# Patient Record
Sex: MALE | Race: WHITE | Employment: OTHER | URBAN - METROPOLITAN AREA
[De-identification: names, ages, dates, MRNs, and addresses within clinical notes are randomized per-mention and may not be internally consistent; named-entity substitution may affect disease eponyms.]

---

## 2017-01-20 ENCOUNTER — HOSPITAL ENCOUNTER (EMERGENCY)
Age: 82
Discharge: HOME | End: 2017-01-21
Attending: EMERGENCY MEDICINE | Admitting: EMERGENCY MEDICINE
Payer: MEDICARE

## 2017-01-20 DIAGNOSIS — R33.9 URINARY RETENTION: Primary | ICD-10-CM

## 2017-01-20 LAB
APPEARANCE UR: CLEAR
BACTERIA URNS QL MICRO: ABNORMAL /HPF
BILIRUB UR QL: NEGATIVE
COLOR UR: YELLOW
EPITH CASTS URNS QL MICRO: NEGATIVE /LPF (ref 0–5)
GLUCOSE UR STRIP.AUTO-MCNC: NEGATIVE MG/DL
HGB UR QL STRIP: ABNORMAL
KETONES UR QL STRIP.AUTO: NEGATIVE MG/DL
LEUKOCYTE ESTERASE UR QL STRIP.AUTO: NEGATIVE
NITRITE UR QL STRIP.AUTO: NEGATIVE
PH UR STRIP: 6 [PH] (ref 5–8)
PROT UR STRIP-MCNC: NEGATIVE MG/DL
RBC #/AREA URNS HPF: ABNORMAL /HPF (ref 0–5)
SP GR UR REFRACTOMETRY: 1.02 (ref 1–1.03)
UROBILINOGEN UR QL STRIP.AUTO: 0.2 EU/DL (ref 0.2–1)
WBC URNS QL MICRO: ABNORMAL /HPF (ref 0–4)

## 2017-01-20 PROCEDURE — 51702 INSERT TEMP BLADDER CATH: CPT

## 2017-01-20 PROCEDURE — 81001 URINALYSIS AUTO W/SCOPE: CPT | Performed by: EMERGENCY MEDICINE

## 2017-01-20 PROCEDURE — 77030034849

## 2017-01-20 PROCEDURE — 99284 EMERGENCY DEPT VISIT MOD MDM: CPT

## 2017-01-20 RX ORDER — PREDNISONE 5 MG/1
7.5 TABLET ORAL DAILY
COMMUNITY
End: 2017-06-27

## 2017-01-21 VITALS
WEIGHT: 164 LBS | RESPIRATION RATE: 18 BRPM | DIASTOLIC BLOOD PRESSURE: 70 MMHG | SYSTOLIC BLOOD PRESSURE: 140 MMHG | OXYGEN SATURATION: 94 % | HEART RATE: 61 BPM | HEIGHT: 67 IN | TEMPERATURE: 97.6 F | BODY MASS INDEX: 25.74 KG/M2

## 2017-01-21 NOTE — ED NOTES
Purposeful rounding completed:    Side rails up x 2:  YES  Bed low and wheels and locked: YES  Call bell in reach: YES  Comfort addressed: YES     Toileting needs addressed: YES  Plan of care reviewed/updated with patient and or family members: YES  IV site assessed: YES  Pain assessed and addressed: denies pain and resting comfortably in NAD.   Waiting for urine results.

## 2017-01-21 NOTE — ED PROVIDER NOTES
[facility] EMERGENCY DEPT      80 y.o. male with noted past medical history who presents to the emergency department c/o urinary retention onset yesterday. Pt states \"I've been dribbling after using the restroom\". Per relative, pt has a hx of an enlarged prostate. She states that the patient was seen by urology in June 2016 and he had a UTI. She then states that they placed in a catheter and was given an antibiotic. She reports the pt's last appointment with Urology was in 08/2016 and his prostate medication was changed. She reports an extensive hx with prostate complications. No other complaints. No current facility-administered medications for this encounter. Current Outpatient Prescriptions   Medication Sig    predniSONE (DELTASONE) 5 mg tablet Take 7.5 mg by mouth daily.  ranitidine (ZANTAC) 150 mg tablet Take 150 mg by mouth two (2) times a day.  tamsulosin (FLOMAX) 0.4 mg capsule Take 1 Cap by mouth daily (after dinner).  finasteride (PROSCAR) 5 mg tablet Take 1 Tab by mouth daily.  furosemide (LASIX) 20 mg tablet 1 daily (Patient taking differently: Take 40 mg by mouth two (2) times a day. 1 daily)    diltiazem CD (CARDIZEM CD) 180 mg ER capsule Take 1 Cap by mouth daily.  albuterol-ipratropium (DUO-NEB) 2.5 mg-0.5 mg/3 ml nebulizer solution 3 mL by Nebulization route every six (6) hours.  aspirin 81 mg chewable tablet Take 1 Tab by mouth daily.  fluticasone-salmeterol (ADVAIR DISKUS) 500-50 mcg/dose diskus inhaler Take 1 Puff by inhalation every twelve (12) hours.  clopidogrel (PLAVIX) 75 mg tablet Take 75 mg by mouth daily. Indications: lot big toe    levothyroxine (SYNTHROID) 50 mcg tablet Take 50 mcg by mouth Daily (before breakfast).  Indications: HYPOTHYROIDISM       Past Medical History   Diagnosis Date    BPH (benign prostatic hyperplasia)     COPD (chronic obstructive pulmonary disease) (HCC)     History of blood clots     Hypercholesterolemia     Hypertension     Hypertensive cardiovascular disease     Hypothyroidism     Infection and inflammatory reaction due to indwelling urinary catheter, subsequent encounter     Peripheral vascular disease ([facility] [address]     Pneumonia     Urinary retention     UTI (urinary tract infection)        History reviewed. No pertinent past surgical history. History reviewed. No pertinent family history. Social History     Social History    Marital status:      Spouse name: N/A    Number of children: N/A    Years of education: N/A     Occupational History    Not on file. Social History Main Topics    Smoking status: Former Smoker    Smokeless tobacco: Not on file    Alcohol use No    Drug use: No    Sexual activity: Not on file     Other Topics Concern    Not on file     Social History Narrative       No Known Allergies    Patient's primary care provider (as noted in EPIC):  Juliana Murillo MD    REVIEW OF SYSTEMS:    Constitutional:  Negative for diaphoresis. HENT:  Negative for congestion. Respiratory:  Negative for cough and shortness of breath. Cardiovascular:  Negative for chest pain and palpitations. Gastrointestinal:  No rectal bleeding. Genitourinary:  Positive urinary retention. Negative for flank pain. Musculoskeletal:  Negative for back pain. Skin:  Negative for pallor. Neurological:  Negative for weakness. Visit Vitals    /76 (BP 1 Location: Right arm, BP Patient Position: Sitting)    Pulse 60    Temp 97.6 °F (36.4 °C)    Resp 20    Ht 5' 7\" (1.702 m)    Wt 74.4 kg (164 lb)    SpO2 93%    BMI 25.69 kg/m2       PHYSICAL EXAM:    CONSTITUTIONAL:  Alert, in no apparent distress;  well developed;  well nourished. HEAD:  Normocephalic, atraumatic. EYES:  EOMI. Non-icteric sclera. Normal conjunctiva. ENTM:  Nose:  no rhinorrhea. Throat:  no erythema or exudate, mucous membranes moist.  NECK:  No JVD.   Supple  RESPIRATORY:  Chest clear, equal breath sounds, good air movement. CARDIOVASCULAR:  Regular rate and rhythm. No murmurs, rubs, or gallops. GI:  GI:  Normal bowel sounds, abdomen soft with minimal suprapubic abdominal tenderness to palpation. No rebound or guarding. BACK:  Non-tender. No CVAT. UPPER EXT:  Normal inspection. LOWER EXT:  No edema, no calf tenderness. Distal pulses intact. NEURO:  Moves all four extremities, and grossly normal motor exam.  SKIN:  No rashes;  Normal for age. PSYCH:  Alert and normal affect.     Abnormal lab results from this emergency department encounter:  Labs Reviewed   URINALYSIS W/ RFLX MICROSCOPIC - Abnormal; Notable for the following:        Result Value    Blood SMALL (*)     All other components within normal limits   URINE MICROSCOPIC ONLY - Abnormal; Notable for the following:     Bacteria FEW (*)     All other components within normal limits       Lab values for this patient within approximately the last 12 hours:  Recent Results (from the past 12 hour(s))   URINALYSIS W/ RFLX MICROSCOPIC    Collection Time: 01/20/17 10:53 PM   Result Value Ref Range    Color YELLOW      Appearance CLEAR      Specific gravity 1.023 1.005 - 1.030      pH (UA) 6.0 5.0 - 8.0      Protein NEGATIVE  NEG mg/dL    Glucose NEGATIVE  NEG mg/dL    Ketone NEGATIVE  NEG mg/dL    Bilirubin NEGATIVE  NEG      Blood SMALL (A) NEG      Urobilinogen 0.2 0.2 - 1.0 EU/dL    Nitrites NEGATIVE  NEG      Leukocyte Esterase NEGATIVE  NEG     URINE MICROSCOPIC ONLY    Collection Time: 01/20/17 10:53 PM   Result Value Ref Range    WBC 0 to 3 0 - 4 /hpf    RBC 4 to 10 0 - 5 /hpf    Epithelial cells NEGATIVE  0 - 5 /lpf    Bacteria FEW (A) NEG /hpf       Radiologist and cardiologist interpretations if available at time of this note:  No orders to display       Medication(s) ordered for patient during this emergency visit encounter:  Medications - No data to display    ED COURSE:      IMPRESSION AND MEDICAL DECISION MAKING:  Based upon the patients presentation with noted HPI and PE, along with the work up done in the emergency department, I believe that the patient is having urinary retention. DIAGNOSIS:  1. Urinary retention  2. BPH history     SPECIFIC PATIENT INSTRUCTIONS FROM THE PHYSICIAN WHO TREATED YOU IN THE ER TODAY:  1. Return if any concerns or worsening of condition(s)  2. Keep the Steele catheter in until seen by the urologist.   3. Follow up with your urologist or the one listed in these discharge instructions. IAN Mckeon Board Certified Emergency Physician    Provider Attestation:  If a scribe was utilized in generation of this patient record, I personally performed the services described in the documentation, reviewed the documentation, as recorded by the scribe in my presence, and it accurately records the patient's history of presenting illness, review of systems, patient physical examination, and procedures performed by me as the attending physician. IAN Mckeon Board Certified Emergency Physician  1/20/2017.  10:23 PM    SCRIBE ATTESTATION STATEMENT  Documented by: Tiki stubbsing for, and in the presence of, Nhi Murry MD 10:26 PM    Signed by: Carin Worrell, 01/21/17 10:26 PM

## 2017-01-21 NOTE — DISCHARGE INSTRUCTIONS
SPECIFIC PATIENT INSTRUCTIONS FROM THE PHYSICIAN WHO TREATED YOU IN THE ER TODAY:  1. Return if any concerns or worsening of condition(s)  2. Keep the Steele catheter in until seen by the urologist.   3. Follow up with your urologist or the one listed in these discharge instructions. Urinary Retention: Care Instructions  Your Care Instructions    Urinary retention means that you aren't able to urinate. In men, it is often caused by a blockage of the urinary tract from an enlarged prostate gland. In men and women, it can also be caused by an infection or nerve damage. Or it may be a side effect of a medicine. The doctor may have drained the urine from your bladder. If you still have problems passing urine, you may need to use a catheter at home. This is used to empty your bladder until the problem can be fixed. Your doctor may put a catheter in your bladder before you go home. If so, he or she will tell you when to come back to have the catheter removed. The doctor has checked you closely. But problems can develop later. If you notice any problems or new symptoms, get medical treatment right away. Follow-up care is a key part of your treatment and safety. Be sure to make and go to all appointments, and call your doctor if you are having problems. It's also a good idea to know your test results and keep a list of the medicines you take. How can you care for yourself at home? · Take your medicines exactly as prescribed. Call your doctor if you think you are having a problem with your medicine. You will get more details on the specific medicines your doctor prescribes. · Check with your doctor before you use any over-the-counter medicines. Many cold and allergy medicines, for example, can make this problem worse. Make sure your doctor knows all of the medicines, vitamins, supplements, and herbal remedies you take. · Spread out through the day the amount of fluid you drink.  Do not drink a lot at bedtime. · Avoid alcohol and caffeine. · If you have been given a catheter, or if one is already in place, follow the instructions you were given. Always wash your hands before and after you handle the catheter. When should you call for help? Call your doctor now or seek immediate medical care if:  · You cannot urinate at all, or it is getting harder to urinate. · You have symptoms of a urinary tract infection. These may include:  ¨ Pain or burning when you urinate. ¨ A frequent need to urinate without being able to pass much urine. ¨ Pain in the flank, which is just below the rib cage and above the waist on either side of the back. ¨ Blood in your urine. ¨ A fever. Watch closely for changes in your health, and be sure to contact your doctor if:  · You have any problems with your catheter. · You do not get better as expected. Where can you learn more? Go to http://leanne-cate.info/. Enter M244 in the search box to learn more about \"Urinary Retention: Care Instructions. \"  Current as of: August 12, 2016  Content Version: 11.1  © 9647-1910 [employer]. Care instructions adapted under license by [employer] (which disclaims liability or warranty for this information). If you have questions about a medical condition or this instruction, always ask your healthcare professional. [address] any warranty or liability for your use of this information. [employer] Activation    Thank you for requesting access to [employer]. Please follow the instructions below to securely access and download your online medical record. [employer] allows you to send messages to your doctor, view your test results, renew your prescriptions, schedule appointments, and more. How Do I Sign Up? 1. In your internet browser, go to [email or web address]. 2. Click on the First Time User? Click Here link in the Sign In box.  You will see the New Member Sign Up page. 3. Enter your [employer] Access Code exactly as it appears below. You will not need to use this code after youve completed the sign-up process. If you do not sign up before the expiration date, you must request a new code. [employer] Access Code: [email or web address]  Expires: 2017  9:38 PM (This is the date your [employer] access code will )    4. Enter the last four digits of your Social Security Number (xxxx) and Date of Birth (mm/dd/yyyy) as indicated and click Submit. You will be taken to the next sign-up page. 5. Create a Over[employer] ID. This will be your [employer] login ID and cannot be changed, so think of one that is secure and easy to remember. 6. Create a [employer] password. You can change your password at any time. 7. Enter your Password Reset Question and Answer. This can be used at a later time if you forget your password. 8. Enter your e-mail address. You will receive e-mail notification when new information is available in [address] 9. Click Sign Up. You can now view and download portions of your medical record. 10. Click the Download Summary menu link to download a portable copy of your medical information. Additional Information    If you have questions, please visit the Frequently Asked Questions section of the [employer] website at [email or web address] Remember, [employer] is NOT to be used for urgent needs. For medical emergencies, dial 911.

## 2017-01-21 NOTE — ED NOTES
Steele catheter inserted without difficulty. Pt tolerated the procedure well. Urine sample sent to the lab.

## 2017-01-21 NOTE — ED NOTES
Discharged paperwork reviewed with patient. Patient states understanding. All questions answered. Armband removed. Pt escorted by wheelchair from the ED with family.

## 2017-06-13 ENCOUNTER — HOSPITAL ENCOUNTER (INPATIENT)
Age: 82
LOS: 14 days | Discharge: NURSING, REHAB OR LONG-TERM CARE | DRG: 190 | End: 2017-06-27
Attending: INTERNAL MEDICINE | Admitting: INTERNAL MEDICINE
Payer: MEDICARE

## 2017-06-13 ENCOUNTER — APPOINTMENT (OUTPATIENT)
Dept: GENERAL RADIOLOGY | Age: 82
DRG: 190 | End: 2017-06-13
Attending: EMERGENCY MEDICINE
Payer: MEDICARE

## 2017-06-13 DIAGNOSIS — E87.1 HYPONATREMIA: ICD-10-CM

## 2017-06-13 DIAGNOSIS — J18.9 PNEUMONIA OF RIGHT LOWER LOBE DUE TO INFECTIOUS ORGANISM: ICD-10-CM

## 2017-06-13 DIAGNOSIS — J44.1 COPD EXACERBATION (HCC): Primary | ICD-10-CM

## 2017-06-13 LAB
ALBUMIN SERPL BCP-MCNC: 2.8 G/DL (ref 3.4–5)
ALBUMIN/GLOB SERPL: 0.8 {RATIO} (ref 0.8–1.7)
ALP SERPL-CCNC: 124 U/L (ref 45–117)
ALT SERPL-CCNC: 118 U/L (ref 16–61)
ANION GAP BLD CALC-SCNC: 13 MMOL/L (ref 3–18)
ARTERIAL PATENCY WRIST A: YES
AST SERPL W P-5'-P-CCNC: 90 U/L (ref 15–37)
ATRIAL RATE: 122 BPM
BASE EXCESS BLD CALC-SCNC: 4 MMOL/L
BASOPHILS # BLD AUTO: 0 K/UL (ref 0–0.06)
BASOPHILS # BLD: 0 % (ref 0–2)
BDY SITE: ABNORMAL
BILIRUB SERPL-MCNC: 0.9 MG/DL (ref 0.2–1)
BNP SERPL-MCNC: 4072 PG/ML (ref 0–1800)
BODY TEMPERATURE: 98.7
BUN SERPL-MCNC: 31 MG/DL (ref 7–18)
BUN/CREAT SERPL: 22 (ref 12–20)
CALCIUM SERPL-MCNC: 8 MG/DL (ref 8.5–10.1)
CALCULATED R AXIS, ECG10: 53 DEGREES
CALCULATED T AXIS, ECG11: 63 DEGREES
CHLORIDE SERPL-SCNC: 81 MMOL/L (ref 100–108)
CO2 SERPL-SCNC: 26 MMOL/L (ref 21–32)
CREAT SERPL-MCNC: 1.42 MG/DL (ref 0.6–1.3)
DIAGNOSIS, 93000: NORMAL
DIFFERENTIAL METHOD BLD: ABNORMAL
EOSINOPHIL # BLD: 0 K/UL (ref 0–0.4)
EOSINOPHIL NFR BLD: 0 % (ref 0–5)
ERYTHROCYTE [DISTWIDTH] IN BLOOD BY AUTOMATED COUNT: 14.3 % (ref 11.6–14.5)
GAS FLOW.O2 O2 DELIVERY SYS: ABNORMAL L/MIN
GLOBULIN SER CALC-MCNC: 3.4 G/DL (ref 2–4)
GLUCOSE SERPL-MCNC: 142 MG/DL (ref 74–99)
HCO3 BLD-SCNC: 28.7 MMOL/L (ref 22–26)
HCT VFR BLD AUTO: 38.7 % (ref 36–48)
HGB BLD-MCNC: 13.4 G/DL (ref 13–16)
LACTATE BLD-SCNC: 1.2 MMOL/L (ref 0.4–2)
LYMPHOCYTES # BLD AUTO: 5 % (ref 21–52)
LYMPHOCYTES # BLD: 1.1 K/UL (ref 0.9–3.6)
MCH RBC QN AUTO: 29.6 PG (ref 24–34)
MCHC RBC AUTO-ENTMCNC: 34.6 G/DL (ref 31–37)
MCV RBC AUTO: 85.4 FL (ref 74–97)
MONOCYTES # BLD: 1.1 K/UL (ref 0.05–1.2)
MONOCYTES NFR BLD AUTO: 5 % (ref 3–10)
NEUTS SEG # BLD: 18.4 K/UL (ref 1.8–8)
NEUTS SEG NFR BLD AUTO: 90 % (ref 40–73)
O2/TOTAL GAS SETTING VFR VENT: 0.5 %
OSMOLALITY SERPL: 266 MOSM/KG H2O (ref 280–300)
PCO2 BLD: 44.5 MMHG (ref 35–45)
PH BLD: 7.42 [PH] (ref 7.35–7.45)
PLATELET # BLD AUTO: 190 K/UL (ref 135–420)
PMV BLD AUTO: 9.5 FL (ref 9.2–11.8)
PO2 BLD: 86 MMHG (ref 80–100)
POTASSIUM SERPL-SCNC: 4.2 MMOL/L (ref 3.5–5.5)
PROT SERPL-MCNC: 6.2 G/DL (ref 6.4–8.2)
Q-T INTERVAL, ECG07: 412 MS
QRS DURATION, ECG06: 90 MS
QTC CALCULATION (BEZET), ECG08: 495 MS
RBC # BLD AUTO: 4.53 M/UL (ref 4.7–5.5)
SAO2 % BLD: 97 % (ref 92–97)
SERVICE CMNT-IMP: ABNORMAL
SODIUM SERPL-SCNC: 120 MMOL/L (ref 136–145)
SPECIMEN TYPE: ABNORMAL
TOTAL RESP. RATE, ITRR: 16
TROPONIN I BLD-MCNC: <0.04 NG/ML (ref 0–0.08)
TSH SERPL DL<=0.05 MIU/L-ACNC: 5.1 UIU/ML (ref 0.36–3.74)
VENTRICULAR RATE, ECG03: 87 BPM
WBC # BLD AUTO: 20.7 K/UL (ref 4.6–13.2)

## 2017-06-13 PROCEDURE — 80053 COMPREHEN METABOLIC PANEL: CPT | Performed by: EMERGENCY MEDICINE

## 2017-06-13 PROCEDURE — 93005 ELECTROCARDIOGRAM TRACING: CPT

## 2017-06-13 PROCEDURE — 83930 ASSAY OF BLOOD OSMOLALITY: CPT | Performed by: INTERNAL MEDICINE

## 2017-06-13 PROCEDURE — 83880 ASSAY OF NATRIURETIC PEPTIDE: CPT | Performed by: EMERGENCY MEDICINE

## 2017-06-13 PROCEDURE — 77010033711 HC HIGH FLOW OXYGEN

## 2017-06-13 PROCEDURE — 65660000000 HC RM CCU STEPDOWN

## 2017-06-13 PROCEDURE — 99285 EMERGENCY DEPT VISIT HI MDM: CPT

## 2017-06-13 PROCEDURE — 74011250636 HC RX REV CODE- 250/636: Performed by: INTERNAL MEDICINE

## 2017-06-13 PROCEDURE — 94640 AIRWAY INHALATION TREATMENT: CPT

## 2017-06-13 PROCEDURE — 84484 ASSAY OF TROPONIN QUANT: CPT

## 2017-06-13 PROCEDURE — 96375 TX/PRO/DX INJ NEW DRUG ADDON: CPT

## 2017-06-13 PROCEDURE — 71010 XR CHEST PORT: CPT

## 2017-06-13 PROCEDURE — 74011000250 HC RX REV CODE- 250: Performed by: INTERNAL MEDICINE

## 2017-06-13 PROCEDURE — 85025 COMPLETE CBC W/AUTO DIFF WBC: CPT | Performed by: EMERGENCY MEDICINE

## 2017-06-13 PROCEDURE — 84443 ASSAY THYROID STIM HORMONE: CPT | Performed by: INTERNAL MEDICINE

## 2017-06-13 PROCEDURE — 83605 ASSAY OF LACTIC ACID: CPT

## 2017-06-13 PROCEDURE — 74011000258 HC RX REV CODE- 258: Performed by: INTERNAL MEDICINE

## 2017-06-13 PROCEDURE — 96365 THER/PROPH/DIAG IV INF INIT: CPT

## 2017-06-13 PROCEDURE — 77030029684 HC NEB SM VOL KT MONA -A

## 2017-06-13 PROCEDURE — 51798 US URINE CAPACITY MEASURE: CPT

## 2017-06-13 PROCEDURE — 36600 WITHDRAWAL OF ARTERIAL BLOOD: CPT

## 2017-06-13 PROCEDURE — 94760 N-INVAS EAR/PLS OXIMETRY 1: CPT

## 2017-06-13 PROCEDURE — 82803 BLOOD GASES ANY COMBINATION: CPT

## 2017-06-13 PROCEDURE — 74011250637 HC RX REV CODE- 250/637: Performed by: INTERNAL MEDICINE

## 2017-06-13 RX ORDER — MAGNESIUM SULFATE HEPTAHYDRATE 40 MG/ML
2 INJECTION, SOLUTION INTRAVENOUS
Status: COMPLETED | OUTPATIENT
Start: 2017-06-13 | End: 2017-06-19

## 2017-06-13 RX ORDER — DILTIAZEM HYDROCHLORIDE 180 MG/1
180 CAPSULE, COATED, EXTENDED RELEASE ORAL DAILY
Status: DISCONTINUED | OUTPATIENT
Start: 2017-06-14 | End: 2017-06-27 | Stop reason: HOSPADM

## 2017-06-13 RX ORDER — HEPARIN SODIUM 5000 [USP'U]/ML
5000 INJECTION, SOLUTION INTRAVENOUS; SUBCUTANEOUS EVERY 12 HOURS
Status: DISCONTINUED | OUTPATIENT
Start: 2017-06-13 | End: 2017-06-27 | Stop reason: HOSPADM

## 2017-06-13 RX ORDER — PANTOPRAZOLE SODIUM 40 MG/1
40 TABLET, DELAYED RELEASE ORAL
Status: DISCONTINUED | OUTPATIENT
Start: 2017-06-14 | End: 2017-06-27 | Stop reason: HOSPADM

## 2017-06-13 RX ORDER — CLOPIDOGREL BISULFATE 75 MG/1
75 TABLET ORAL DAILY
Status: DISCONTINUED | OUTPATIENT
Start: 2017-06-14 | End: 2017-06-27 | Stop reason: HOSPADM

## 2017-06-13 RX ORDER — FUROSEMIDE 10 MG/ML
80 INJECTION INTRAMUSCULAR; INTRAVENOUS
Status: COMPLETED | OUTPATIENT
Start: 2017-06-13 | End: 2017-06-13

## 2017-06-13 RX ORDER — FUROSEMIDE 40 MG/1
40 TABLET ORAL
Status: DISCONTINUED | OUTPATIENT
Start: 2017-06-14 | End: 2017-06-27 | Stop reason: HOSPADM

## 2017-06-13 RX ORDER — TAMSULOSIN HYDROCHLORIDE 0.4 MG/1
0.4 CAPSULE ORAL
Status: DISCONTINUED | OUTPATIENT
Start: 2017-06-13 | End: 2017-06-27 | Stop reason: HOSPADM

## 2017-06-13 RX ORDER — FINASTERIDE 5 MG/1
5 TABLET, FILM COATED ORAL DAILY
Status: DISCONTINUED | OUTPATIENT
Start: 2017-06-14 | End: 2017-06-27 | Stop reason: HOSPADM

## 2017-06-13 RX ORDER — DIAZEPAM 10 MG/2ML
1 INJECTION INTRAMUSCULAR
Status: COMPLETED | OUTPATIENT
Start: 2017-06-13 | End: 2017-06-13

## 2017-06-13 RX ORDER — GUAIFENESIN 100 MG/5ML
81 LIQUID (ML) ORAL DAILY
Status: DISCONTINUED | OUTPATIENT
Start: 2017-06-14 | End: 2017-06-27 | Stop reason: HOSPADM

## 2017-06-13 RX ORDER — LEVOFLOXACIN 5 MG/ML
750 INJECTION, SOLUTION INTRAVENOUS
Status: COMPLETED | OUTPATIENT
Start: 2017-06-13 | End: 2017-06-13

## 2017-06-13 RX ORDER — IPRATROPIUM BROMIDE AND ALBUTEROL SULFATE 2.5; .5 MG/3ML; MG/3ML
3 SOLUTION RESPIRATORY (INHALATION)
Status: COMPLETED | OUTPATIENT
Start: 2017-06-13 | End: 2017-06-13

## 2017-06-13 RX ORDER — ARFORMOTEROL TARTRATE 15 UG/2ML
15 SOLUTION RESPIRATORY (INHALATION)
Status: DISCONTINUED | OUTPATIENT
Start: 2017-06-13 | End: 2017-06-27 | Stop reason: HOSPADM

## 2017-06-13 RX ORDER — LEVOFLOXACIN 5 MG/ML
750 INJECTION, SOLUTION INTRAVENOUS
Status: DISCONTINUED | OUTPATIENT
Start: 2017-06-15 | End: 2017-06-17 | Stop reason: CLARIF

## 2017-06-13 RX ORDER — BUDESONIDE 0.5 MG/2ML
500 INHALANT ORAL
Status: DISCONTINUED | OUTPATIENT
Start: 2017-06-13 | End: 2017-06-27 | Stop reason: HOSPADM

## 2017-06-13 RX ORDER — IPRATROPIUM BROMIDE AND ALBUTEROL SULFATE 2.5; .5 MG/3ML; MG/3ML
3 SOLUTION RESPIRATORY (INHALATION)
Status: DISCONTINUED | OUTPATIENT
Start: 2017-06-13 | End: 2017-06-25

## 2017-06-13 RX ORDER — LEVOTHYROXINE SODIUM 50 UG/1
50 TABLET ORAL
Status: DISCONTINUED | OUTPATIENT
Start: 2017-06-14 | End: 2017-06-27 | Stop reason: HOSPADM

## 2017-06-13 RX ADMIN — TAMSULOSIN HYDROCHLORIDE 0.4 MG: 0.4 CAPSULE ORAL at 23:49

## 2017-06-13 RX ADMIN — HEPARIN SODIUM 5000 UNITS: 5000 INJECTION, SOLUTION INTRAVENOUS; SUBCUTANEOUS at 18:20

## 2017-06-13 RX ADMIN — LEVOFLOXACIN 750 MG: 5 INJECTION, SOLUTION INTRAVENOUS at 13:41

## 2017-06-13 RX ADMIN — ARFORMOTEROL TARTRATE 15 MCG: 15 SOLUTION RESPIRATORY (INHALATION) at 20:24

## 2017-06-13 RX ADMIN — IPRATROPIUM BROMIDE AND ALBUTEROL SULFATE 3 ML: .5; 3 SOLUTION RESPIRATORY (INHALATION) at 20:15

## 2017-06-13 RX ADMIN — METHYLPREDNISOLONE SODIUM SUCCINATE 125 MG: 125 INJECTION, POWDER, FOR SOLUTION INTRAMUSCULAR; INTRAVENOUS at 12:50

## 2017-06-13 RX ADMIN — PIPERACILLIN SODIUM,TAZOBACTAM SODIUM 3.38 G: 3; .375 INJECTION, POWDER, FOR SOLUTION INTRAVENOUS at 18:21

## 2017-06-13 RX ADMIN — IPRATROPIUM BROMIDE AND ALBUTEROL SULFATE 3 ML: .5; 3 SOLUTION RESPIRATORY (INHALATION) at 12:37

## 2017-06-13 RX ADMIN — BUDESONIDE 500 MCG: 0.5 INHALANT RESPIRATORY (INHALATION) at 20:15

## 2017-06-13 RX ADMIN — IPRATROPIUM BROMIDE AND ALBUTEROL SULFATE 3 ML: .5; 3 SOLUTION RESPIRATORY (INHALATION) at 16:52

## 2017-06-13 RX ADMIN — METHYLPREDNISOLONE SODIUM SUCCINATE 40 MG: 40 INJECTION, POWDER, FOR SOLUTION INTRAMUSCULAR; INTRAVENOUS at 18:20

## 2017-06-13 RX ADMIN — DIAZEPAM 1 MG: 5 INJECTION, SOLUTION INTRAMUSCULAR; INTRAVENOUS at 15:42

## 2017-06-13 RX ADMIN — FUROSEMIDE 80 MG: 10 INJECTION, SOLUTION INTRAMUSCULAR; INTRAVENOUS at 12:50

## 2017-06-13 RX ADMIN — IPRATROPIUM BROMIDE AND ALBUTEROL SULFATE 3 ML: .5; 3 SOLUTION RESPIRATORY (INHALATION) at 15:43

## 2017-06-13 RX ADMIN — METHYLPREDNISOLONE SODIUM SUCCINATE 40 MG: 40 INJECTION, POWDER, FOR SOLUTION INTRAMUSCULAR; INTRAVENOUS at 23:50

## 2017-06-13 RX ADMIN — MAGNESIUM SULFATE HEPTAHYDRATE 2 G: 40 INJECTION, SOLUTION INTRAVENOUS at 16:52

## 2017-06-13 NOTE — H&P
[address]    Name:  Radha Damon  MR#:  [id number]  :  [id number]  Account #:  [de-identified]  Date of Adm:  2017      CHIEF COMPLAINT: Cough and shortness of breath. HISTORY OF PRESENT ILLNESS: This is a very pleasant, 80-year-  old white male with history of severe oxygen and steroid dependent  COPD, diastolic congestive heart failure, paroxysmal atrial fibrillation,  among other problems, who presented to the emergency department  earlier on the day of admission with a few day history of increasing  cough, shortness of breath. The patient's symptoms became much  worse the morning of admission. The patient denies chest pain,  abdominal pain, nausea, vomiting, fever or chills. In the ER, the patient's evaluation included a chest x-ray which showed  right lower lobe opacity consistent with pneumonia. He had normal  lactic acid. His blood gases were satisfactory on face tent. The patient  was felt to have pneumonia with exacerbation of COPD and he was  referred for admission. The patient was found also to have significantly  low sodium level at 120. His BNP was also elevated at 4072. PAST MEDICAL HISTORY  1. Severe oxygen and steroid dependent COPD as above. 2. Chronic hypoxic and hypercapnic respiratory failure. 3. Chronic diastolic heart failure. 4. Fatty liver. 5. Hypothyroidism. 6. Benign prostatic hyperplasia. 7. Urine retention in the past requiring Steele catheter. 8. Paroxysmal atrial fibrillation. 9. Hypertension. 10. Chronic kidney disease. 11. Steroid-induced hyperglycemia. 12. Peripheral vascular disease, lower extremities status post  revascularization in the past.  13. History of right upper lobe lung nodule, stable on x-ray, no invasive  workup felt indicated. 14. Osteoarthritis.     PAST SURGICAL HISTORY: The patient had lower extremity  revascularization as above, lumbar decompression and fusion,  removal of skin cancer from the face, in addition to colonoscopy. ALLERGIES: THE PATIENT HAS NO KNOWN DRUG ALLERGIES. CURRENT MEDICATIONS  1. Flomax 0.4 mg daily. 2. Proscar 5 mg daily. 3. Zantac 150 mg twice daily. 4. Advair Diskus 500/50 one puff b.i.d.  5. DuoNeb nebulized treatment twice a day and as needed. 6. Cardizem  mg daily. 7. Prednisone 7.5 mg daily. 8. Synthroid 50 mcg daily. 9. Plavix 75 mg daily. 10. Aspirin 81 mg daily. 11. Lasix 40 mg twice daily. 12. Tylenol as needed. SOCIAL HISTORY: He is , former smoker, does not drink any  alcohol. He has a very supportive daughter. FAMILY HISTORY: The patient unable to provide at this time. REVIEW OF SYSTEMS: Denies chest pain or hemoptysis. Denies  abdominal pain, nausea, vomiting, diarrhea. Denies focal neurologic  symptoms, denies hematuria, dysuria or polyuria. System review otherwise is as per history of the present illness. PHYSICAL EXAMINATION  GENERAL: Elderly male in mild respiratory distress. VITAL SIGNS: Temperature 98.7, pulse 73, respirations 24, blood  pressure 121/84. HEENT: Atraumatic, normocephalic. Sclerae anicteric. NECK: No venous distention, adenopathy or thyromegaly. LUNGS: Diffuse inspiratory and expiratory rhonchi and end-expiratory  wheezes. HEART: Regular rhythm by auscultation. ABDOMEN: Soft, nontender, nondistended. EXTREMITIES: Trace ankle edema. NEUROLOGIC: Nonfocal.    LABS: CBC: WBC 20.7, hemoglobin and hematocrit 13.4/38.7,  platelets 245,504. Chemistry: Sodium 120, chloride 81, BUN 31,  creatinine 1.4, blood sugar 142. Liver enzymes elevated and BNP as  above, 4072. Arterial blood gases on face tent: pH 7.4, pCO2 44, pO2  86. Chest x-ray: Right lower lobe infiltrate. EKG: Atrial fibrillation with  controlled response. IMPRESSION  1. Acute on chronic hypoxic respiratory failure. 2. Acute right lower lobe pneumonia. 3. Acute exacerbation of chronic obstructive pulmonary disease.   4. Paroxysmal atrial fibrillation. 5. Hyponatremia. 6. Elevated liver enzymes  7. As per past medical history. PLAN: The patient will be admitted to telemetry. The patient will be  started on antibiotics, Levaquin and Zosyn, to cover for community-  acquired pneumonia and COPD patient. DVT prophylaxis with heparin  subcutaneously. Maximize pulmonary status with inhaled  bronchodilators including inhaled steroids and long-acting beta agonist.  IV steroids also will be provided. The patient will be treated with fluid  restriction for his hyponatremia. Lasix will be started p.o. Will monitor  electrolytes. Will obtain baseline labs including serum osmolality, urine  osmolality, urine sodium, etc. Further management accordingly.         MD MEERA Marquez / Alex Torres  D:  06/13/2017   18:38  T:  06/13/2017   19:11  Job #:  [id number]

## 2017-06-13 NOTE — ED PROVIDER NOTES
HPI Comments: 12:25 PM Chandrika Vasques is a 80 y.o. male with a hx of COPD, HTN, PVD, BPH, and other noted PMH who presents to the ED via EMS c/o SOB onset this morning. Pt states that he was breathing fine yesterday, but today he was having a hard time breathing. He denies chest pain, diaphoresis, and fever. Pt does mention that he is currently on blood thinners. No other associated symptoms or modifying factors at this time. The history is provided by the patient. Past Medical History:   Diagnosis Date    BPH (benign prostatic hyperplasia)     COPD (chronic obstructive pulmonary disease) ([facility] [address]     History of blood clots     Hypercholesterolemia     Hypertension     Hypertensive cardiovascular disease     Hypothyroidism     Infection and inflammatory reaction due to indwelling urinary catheter, subsequent encounter     Peripheral vascular disease ([facility] [address]     Pneumonia     Urinary retention     UTI (urinary tract infection)        History reviewed. No pertinent surgical history. Family History:   Problem Relation Age of Onset    Family history unknown: Yes       Social History     Social History    Marital status:      Spouse name: N/A    Number of children: N/A    Years of education: N/A     Occupational History    Not on file. Social History Main Topics    Smoking status: Former Smoker    Smokeless tobacco: Not on file    Alcohol use No    Drug use: No    Sexual activity: Not on file     Other Topics Concern    Not on file     Social History Narrative         ALLERGIES: Review of patient's allergies indicates no known allergies. Review of Systems   Constitutional: Negative for chills, diaphoresis and fever. HENT: Negative for congestion and rhinorrhea. Respiratory: Positive for shortness of breath and wheezing. Negative for cough. Cardiovascular: Negative for chest pain and leg swelling. Gastrointestinal: Negative for abdominal pain and nausea. Genitourinary: Negative for dysuria and hematuria. Musculoskeletal: Negative for arthralgias and myalgias. Skin: Negative for rash and wound. Neurological: Negative for light-headedness and headaches. Psychiatric/Behavioral: Negative for confusion and hallucinations. All other systems reviewed and are negative. Vitals:    06/13/17 1200 06/13/17 1215 06/13/17 1228   BP: 103/73 127/71    Pulse: 80 84    Resp: 23 17    Temp:   98.7 °F (37.1 °C)   SpO2: 95% 93%    Weight:   75.8 kg (167 lb)   Height:   5' 7\" (1.702 m)            Physical Exam   Constitutional: He is oriented to person, place, and time. No distress. Elderly, chronically ill appearing male in moderate respiratory distress; alert; able to speak in complete sentences. HENT:   Head: Normocephalic and atraumatic. Nose: Nose normal.   Eyes: EOM are normal. Pupils are equal, round, and reactive to light. Neck: Neck supple. JVD (bilateral) present. Cardiovascular: Normal rate, regular rhythm and normal heart sounds. Exam reveals no gallop and no friction rub. No murmur heard. Pulmonary/Chest: No respiratory distress. He has wheezes. He has no rales. Appears moderately SOB  Bilateral expiratory wheeze  Able to speak in full sentences   Abdominal: Soft. He exhibits distension. There is no tenderness. There is no rebound and no guarding. Musculoskeletal: He exhibits edema. He exhibits no tenderness. Bilateral leg atrophy; +2 pitting edema in lower extremities    Neurological: He is alert and oriented to person, place, and time. He exhibits normal muscle tone. Coordination normal.   Skin: Skin is warm and dry. No rash noted. He is not diaphoretic. Dark areas noted on hands  Areas of hyperpigmentation   Ecchymotic areas on arm    Psychiatric: He has a normal mood and affect. Nursing note and vitals reviewed.        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 Dr Ty Adams. Presents with SOB since this am. Wheezing on  Exam. Will order labs; steroids; nebs. Will likely need admission.     ED Course       Procedures  Vitals:  Patient Vitals for the past 12 hrs:   Temp Pulse Resp BP SpO2   06/13/17 1228 98.7 °F (37.1 °C) - - - -   06/13/17 1215 - 84 17 127/71 93 %   06/13/17 1200 - 80 23 103/73 95 %       Medications ordered:   Medications   levoFLOXacin (LEVAQUIN) 750 mg in D5W IVPB (750 mg IntraVENous New Bag 6/13/17 1341)   albuterol-ipratropium (DUO-NEB) 2.5 MG-0.5 MG/3 ML (3 mL Nebulization Given 6/13/17 1237)   methylPREDNISolone (PF) (SOLU-MEDROL) injection 125 mg (125 mg IntraVENous Given 6/13/17 1250)   furosemide (LASIX) injection 80 mg (80 mg IntraVENous Given 6/13/17 1250)         Lab findings:  Recent Results (from the past 12 hour(s))   EKG, 12 LEAD, INITIAL    Collection Time: 06/13/17 11:57 AM   Result Value Ref Range    Ventricular Rate 81 BPM    Atrial Rate 75 BPM    QRS Duration 84 ms    Q-T Interval 394 ms    QTC Calculation (Bezet) 457 ms    Calculated R Axis 50 degrees    Calculated T Axis 50 degrees    Diagnosis       Atrial fibrillation with premature ventricular or aberrantly conducted   complexes  Septal infarct (cited on or before 05-JUN-2016)  Abnormal ECG  When compared with ECG of 05-JUN-2016 23:40,  Atrial fibrillation has replaced Sinus rhythm  Questionable change in initial forces of Anteroseptal leads     METABOLIC PANEL, COMPREHENSIVE    Collection Time: 06/13/17 12:20 PM   Result Value Ref Range    Sodium 120 (L) 136 - 145 mmol/L    Potassium 4.2 3.5 - 5.5 mmol/L    Chloride 81 (L) 100 - 108 mmol/L    CO2 26 21 - 32 mmol/L    Anion gap 13 3.0 - 18 mmol/L    Glucose 142 (H) 74 - 99 mg/dL    BUN 31 (H) 7.0 - 18 MG/DL    Creatinine 1.42 (H) 0.6 - 1.3 MG/DL BUN/Creatinine ratio 22 (H) 12 - 20      GFR est AA 57 (L) >60 ml/min/1.73m2    GFR est non-AA 47 (L) >60 ml/min/1.73m2    Calcium 8.0 (L) 8.5 - 10.1 MG/DL    Bilirubin, total 0.9 0.2 - 1.0 MG/DL    ALT (SGPT) 118 (H) 16 - 61 U/L    AST (SGOT) 90 (H) 15 - 37 U/L    Alk. phosphatase 124 (H) 45 - 117 U/L    Protein, total 6.2 (L) 6.4 - 8.2 g/dL    Albumin 2.8 (L) 3.4 - 5.0 g/dL    Globulin 3.4 2.0 - 4.0 g/dL    A-G Ratio 0.8 0.8 - 1.7     CBC WITH AUTOMATED DIFF    Collection Time: 06/13/17 12:20 PM   Result Value Ref Range    WBC 20.7 (H) 4.6 - 13.2 K/uL    RBC 4.53 (L) 4.70 - 5.50 M/uL    HGB 13.4 13.0 - 16.0 g/dL    HCT 38.7 36.0 - 48.0 %    MCV 85.4 74.0 - 97.0 FL    MCH 29.6 24.0 - 34.0 PG    MCHC 34.6 31.0 - 37.0 g/dL    RDW 14.3 11.6 - 14.5 %    PLATELET 130 972 - 304 K/uL    MPV 9.5 9.2 - 11.8 FL    NEUTROPHILS 90 (H) 40 - 73 %    LYMPHOCYTES 5 (L) 21 - 52 %    MONOCYTES 5 3 - 10 %    EOSINOPHILS 0 0 - 5 %    BASOPHILS 0 0 - 2 %    ABS. NEUTROPHILS 18.4 (H) 1.8 - 8.0 K/UL    ABS. LYMPHOCYTES 1.1 0.9 - 3.6 K/UL    ABS. MONOCYTES 1.1 0.05 - 1.2 K/UL    ABS. EOSINOPHILS 0.0 0.0 - 0.4 K/UL    ABS. BASOPHILS 0.0 0.0 - 0.06 K/UL    DF AUTOMATED     PRO-BNP    Collection Time: 06/13/17 12:20 PM   Result Value Ref Range    NT pro-BNP 4072 (H) 0 - 1800 PG/ML   POC TROPONIN-I    Collection Time: 06/13/17 12:48 PM   Result Value Ref Range    Troponin-I (POC) <0.04 0.00 - 0.08 ng/mL   POC LACTIC ACID    Collection Time: 06/13/17 12:50 PM   Result Value Ref Range    Lactic Acid (POC) 1.2 0.4 - 2.0 mmol/L   POC G3    Collection Time: 06/13/17 12:50 PM   Result Value Ref Range    Device: FACE TENT      FIO2 (POC) 0.50 %    pH (POC) 7.418 7.35 - 7.45      pCO2 (POC) 44.5 35.0 - 45.0 MMHG    pO2 (POC) 86 80 - 100 MMHG    HCO3 (POC) 28.7 (H) 22 - 26 MMOL/L    sO2 (POC) 97 92 - 97 %    Base excess (POC) 4 mmol/L    Allens test (POC) YES      Total resp. rate 16      Site RIGHT RADIAL      Patient temp.  98.7      Specimen type (POC) ARTERIAL      Performed by Penny Reyes        EKG interpretation by ED Physician:  11:58 AM AFIB with a rate of 87. Septal infarct, age undetermined. X-Ray, CT or other radiology findings or impressions:  XR CHEST PORT   Final Result   IMPRESSION:     Right basilar infiltrate and/or atelectasis at the right lung base superimposed  upon chronic interstitial changes. Stable right apical spiculated focus is 2006,  presumed scar. Progress notes, Consult notes or additional Procedure notes:   Consult:  Discussed care with Dr. Santa Castro (PCP). Standard discussion; including history of patients chief complaint, available diagnostic results, and treatment course. Agrees with admission. 2:02 PM      Reevaluation of patient:   2:14 PM Pt reevaluated at this time. Discussed results and findings, as well as, plan for admission. Pt verbalizes understanding and agreement with plan. 3:38 PM Patient wife at bedside. She states that the patient is getting agitated and usually gets some sedation; and he is having trouble breathing. Pt c/o increase work of breathing and bilateral exp wheezes. Will repeat neb; give magnesium; pending admission. Pt and wife questions answered. Disposition:  Diagnosis:   1. COPD exacerbation ([facility] [address]    2. Pneumonia of right lower lobe due to infectious organism    3. Hyponatremia        Disposition: Admit    Follow-up Information     None            Patient's Medications   Start Taking    No medications on file   Continue Taking    ALBUTEROL-IPRATROPIUM (DUO-NEB) 2.5 MG-0.5 MG/3 ML NEBULIZER SOLUTION    3 mL by Nebulization route every six (6) hours. ASPIRIN 81 MG CHEWABLE TABLET    Take 1 Tab by mouth daily. CLOPIDOGREL (PLAVIX) 75 MG TABLET    Take 75 mg by mouth daily. Indications: lot big toe    DILTIAZEM CD (CARDIZEM CD) 180 MG ER CAPSULE    Take 1 Cap by mouth daily. FINASTERIDE (PROSCAR) 5 MG TABLET    Take 1 Tab by mouth daily.     FLUTICASONE-SALMETEROL (ADVAIR DISKUS) 500-50 MCG/DOSE DISKUS INHALER    Take 1 Puff by inhalation every twelve (12) hours. FUROSEMIDE (LASIX) 20 MG TABLET    1 daily    LEVOTHYROXINE (SYNTHROID) 50 MCG TABLET    Take 50 mcg by mouth Daily (before breakfast). Indications: HYPOTHYROIDISM    NEBULIZER    by Does Not Apply route four (4) times daily. Indications: prn    PREDNISONE (DELTASONE) 5 MG TABLET    Take 7.5 mg by mouth daily. RANITIDINE (ZANTAC) 150 MG TABLET    Take 150 mg by mouth two (2) times a day. TAMSULOSIN (FLOMAX) 0.4 MG CAPSULE    Take 1 Cap by mouth daily (after dinner). These Medications have changed    No medications on file   Stop Taking    No medications on file       SCRIBE ATTESTATION STATEMENT  Documented by: Francina Oppenheim for, and in the presence of, Fran Verma MD 12:24 PM    Signed by: Carin Navarro, 06/13/17 12:24 PM    PROVIDER ATTESTATION STATEMENT  I personally performed the services described in the documentation, reviewed the documentation, as recorded by the scribe in my presence, and it accurately and completely records my words and actions.   Fran Verma MD

## 2017-06-13 NOTE — ROUTINE PROCESS
Bedside and Verbal shift change report given to JIGNESH RN (oncoming nurse) by Omari Ashley RN (offgoing nurse). Report included the following information SBAR, Kardex and MAR.

## 2017-06-13 NOTE — PROGRESS NOTES
[facility] Dosing Services     Pharmacy adjusting dose for Piperacillin-Tazobactam (Zosyn) per renal and extended infusion protocol. Patient received one-time loading dose 30 minute infusion on 6/13    Labs:   Serum Creatinine/CrCl: 1.42 mg/dl/ 33.6 ml/min    Plan:  Changed Zosyn to 3.375 gm IV q8h extended 4 hours infusion. Pharmacy to follow and will redose based on renal function changes. Thanks.

## 2017-06-13 NOTE — ROUTINE PROCESS
1700 - patient to 3005 via stretcher from ER - daughter at bedside. Patient alert and oriented to person, hospital, day and events. Placed on high flow O2 by RT per orders. Daughter assisted with admission - per family, patient is a DNR. Dr. Surya Santillan paged to update code status in EMR. Assessment completed. No complaints of pain. Bed locked and low with bed alarm on.

## 2017-06-13 NOTE — PROGRESS NOTES
Patient off NRB and placed on Opti HFNC 35Flow and 55% FIO2- HR 72 / Sats 92%  Wean Fio2 to Maintain Sats >88% per Dr Reba Hitchcock.

## 2017-06-14 ENCOUNTER — APPOINTMENT (OUTPATIENT)
Dept: ULTRASOUND IMAGING | Age: 82
DRG: 190 | End: 2017-06-14
Attending: INTERNAL MEDICINE
Payer: MEDICARE

## 2017-06-14 PROBLEM — E44.0: Status: ACTIVE | Noted: 2017-06-14

## 2017-06-14 PROBLEM — N17.9 AKI (ACUTE KIDNEY INJURY) (HCC): Status: ACTIVE | Noted: 2017-06-14

## 2017-06-14 PROBLEM — J96.21 ACUTE ON CHRONIC RESPIRATORY FAILURE WITH HYPOXEMIA (HCC): Status: ACTIVE | Noted: 2017-06-14

## 2017-06-14 PROBLEM — E87.6 HYPOKALEMIA: Status: ACTIVE | Noted: 2017-06-14

## 2017-06-14 LAB
ANION GAP BLD CALC-SCNC: 13 MMOL/L (ref 3–18)
BASOPHILS # BLD AUTO: 0 K/UL (ref 0–0.06)
BASOPHILS # BLD: 0 % (ref 0–2)
BUN SERPL-MCNC: 35 MG/DL (ref 7–18)
BUN/CREAT SERPL: 21 (ref 12–20)
CALCIUM SERPL-MCNC: 7.9 MG/DL (ref 8.5–10.1)
CHLORIDE SERPL-SCNC: 86 MMOL/L (ref 100–108)
CO2 SERPL-SCNC: 28 MMOL/L (ref 21–32)
CREAT SERPL-MCNC: 1.64 MG/DL (ref 0.6–1.3)
D DIMER PPP FEU-MCNC: 1.87 UG/ML(FEU)
DIFFERENTIAL METHOD BLD: ABNORMAL
EOSINOPHIL # BLD: 0 K/UL (ref 0–0.4)
EOSINOPHIL NFR BLD: 0 % (ref 0–5)
ERYTHROCYTE [DISTWIDTH] IN BLOOD BY AUTOMATED COUNT: 14 % (ref 11.6–14.5)
GLUCOSE SERPL-MCNC: 137 MG/DL (ref 74–99)
HCT VFR BLD AUTO: 37.6 % (ref 36–48)
HGB BLD-MCNC: 13 G/DL (ref 13–16)
LYMPHOCYTES # BLD AUTO: 4 % (ref 21–52)
LYMPHOCYTES # BLD: 0.6 K/UL (ref 0.9–3.6)
MCH RBC QN AUTO: 29.7 PG (ref 24–34)
MCHC RBC AUTO-ENTMCNC: 34.6 G/DL (ref 31–37)
MCV RBC AUTO: 85.8 FL (ref 74–97)
MONOCYTES # BLD: 0.6 K/UL (ref 0.05–1.2)
MONOCYTES NFR BLD AUTO: 4 % (ref 3–10)
NEUTS SEG # BLD: 15 K/UL (ref 1.8–8)
NEUTS SEG NFR BLD AUTO: 92 % (ref 40–73)
PLATELET # BLD AUTO: 217 K/UL (ref 135–420)
PMV BLD AUTO: 10.1 FL (ref 9.2–11.8)
POTASSIUM SERPL-SCNC: 3.4 MMOL/L (ref 3.5–5.5)
RBC # BLD AUTO: 4.38 M/UL (ref 4.7–5.5)
SODIUM SERPL-SCNC: 127 MMOL/L (ref 136–145)
WBC # BLD AUTO: 16.2 K/UL (ref 4.6–13.2)

## 2017-06-14 PROCEDURE — 74011250636 HC RX REV CODE- 250/636: Performed by: INTERNAL MEDICINE

## 2017-06-14 PROCEDURE — 74011250637 HC RX REV CODE- 250/637: Performed by: INTERNAL MEDICINE

## 2017-06-14 PROCEDURE — 80048 BASIC METABOLIC PNL TOTAL CA: CPT | Performed by: INTERNAL MEDICINE

## 2017-06-14 PROCEDURE — 74011000250 HC RX REV CODE- 250: Performed by: INTERNAL MEDICINE

## 2017-06-14 PROCEDURE — 65660000000 HC RM CCU STEPDOWN

## 2017-06-14 PROCEDURE — 94640 AIRWAY INHALATION TREATMENT: CPT

## 2017-06-14 PROCEDURE — 36415 COLL VENOUS BLD VENIPUNCTURE: CPT | Performed by: INTERNAL MEDICINE

## 2017-06-14 PROCEDURE — 74011000258 HC RX REV CODE- 258: Performed by: INTERNAL MEDICINE

## 2017-06-14 PROCEDURE — 94760 N-INVAS EAR/PLS OXIMETRY 1: CPT

## 2017-06-14 PROCEDURE — 76775 US EXAM ABDO BACK WALL LIM: CPT

## 2017-06-14 PROCEDURE — 85025 COMPLETE CBC W/AUTO DIFF WBC: CPT | Performed by: INTERNAL MEDICINE

## 2017-06-14 PROCEDURE — 85379 FIBRIN DEGRADATION QUANT: CPT | Performed by: INTERNAL MEDICINE

## 2017-06-14 RX ORDER — POTASSIUM CHLORIDE 1.5 G/1.77G
40 POWDER, FOR SOLUTION ORAL DAILY
Status: COMPLETED | OUTPATIENT
Start: 2017-06-14 | End: 2017-06-14

## 2017-06-14 RX ORDER — POTASSIUM CHLORIDE 20MEQ/15ML
40 LIQUID (ML) ORAL DAILY
Status: DISCONTINUED | OUTPATIENT
Start: 2017-06-14 | End: 2017-06-14 | Stop reason: CLARIF

## 2017-06-14 RX ADMIN — ASPIRIN 81 MG 81 MG: 81 TABLET ORAL at 08:31

## 2017-06-14 RX ADMIN — PANTOPRAZOLE SODIUM 40 MG: 40 TABLET, DELAYED RELEASE ORAL at 08:31

## 2017-06-14 RX ADMIN — METHYLPREDNISOLONE SODIUM SUCCINATE 40 MG: 40 INJECTION, POWDER, FOR SOLUTION INTRAMUSCULAR; INTRAVENOUS at 16:24

## 2017-06-14 RX ADMIN — PIPERACILLIN SODIUM,TAZOBACTAM SODIUM 3.38 G: 3; .375 INJECTION, POWDER, FOR SOLUTION INTRAVENOUS at 18:07

## 2017-06-14 RX ADMIN — IPRATROPIUM BROMIDE AND ALBUTEROL SULFATE 3 ML: .5; 3 SOLUTION RESPIRATORY (INHALATION) at 04:51

## 2017-06-14 RX ADMIN — HEPARIN SODIUM 5000 UNITS: 5000 INJECTION, SOLUTION INTRAVENOUS; SUBCUTANEOUS at 18:06

## 2017-06-14 RX ADMIN — FUROSEMIDE 40 MG: 40 TABLET ORAL at 16:24

## 2017-06-14 RX ADMIN — PIPERACILLIN SODIUM,TAZOBACTAM SODIUM 3.38 G: 3; .375 INJECTION, POWDER, FOR SOLUTION INTRAVENOUS at 01:51

## 2017-06-14 RX ADMIN — IPRATROPIUM BROMIDE AND ALBUTEROL SULFATE 3 ML: .5; 3 SOLUTION RESPIRATORY (INHALATION) at 00:24

## 2017-06-14 RX ADMIN — LEVOTHYROXINE SODIUM 50 MCG: 50 TABLET ORAL at 08:31

## 2017-06-14 RX ADMIN — METHYLPREDNISOLONE SODIUM SUCCINATE 40 MG: 40 INJECTION, POWDER, FOR SOLUTION INTRAMUSCULAR; INTRAVENOUS at 05:36

## 2017-06-14 RX ADMIN — FINASTERIDE 5 MG: 5 TABLET, FILM COATED ORAL at 10:41

## 2017-06-14 RX ADMIN — TAMSULOSIN HYDROCHLORIDE 0.4 MG: 0.4 CAPSULE ORAL at 22:12

## 2017-06-14 RX ADMIN — ARFORMOTEROL TARTRATE 15 MCG: 15 SOLUTION RESPIRATORY (INHALATION) at 08:31

## 2017-06-14 RX ADMIN — BUDESONIDE 500 MCG: 0.5 INHALANT RESPIRATORY (INHALATION) at 20:15

## 2017-06-14 RX ADMIN — CLOPIDOGREL BISULFATE 75 MG: 75 TABLET ORAL at 08:30

## 2017-06-14 RX ADMIN — PIPERACILLIN SODIUM,TAZOBACTAM SODIUM 3.38 G: 3; .375 INJECTION, POWDER, FOR SOLUTION INTRAVENOUS at 09:18

## 2017-06-14 RX ADMIN — ARFORMOTEROL TARTRATE 15 MCG: 15 SOLUTION RESPIRATORY (INHALATION) at 20:24

## 2017-06-14 RX ADMIN — BUDESONIDE 500 MCG: 0.5 INHALANT RESPIRATORY (INHALATION) at 08:31

## 2017-06-14 RX ADMIN — DILTIAZEM HYDROCHLORIDE 180 MG: 180 CAPSULE, EXTENDED RELEASE ORAL at 08:31

## 2017-06-14 RX ADMIN — IPRATROPIUM BROMIDE AND ALBUTEROL SULFATE 3 ML: .5; 3 SOLUTION RESPIRATORY (INHALATION) at 15:31

## 2017-06-14 RX ADMIN — IPRATROPIUM BROMIDE AND ALBUTEROL SULFATE 3 ML: .5; 3 SOLUTION RESPIRATORY (INHALATION) at 08:31

## 2017-06-14 RX ADMIN — IPRATROPIUM BROMIDE AND ALBUTEROL SULFATE 3 ML: .5; 3 SOLUTION RESPIRATORY (INHALATION) at 20:15

## 2017-06-14 RX ADMIN — METHYLPREDNISOLONE SODIUM SUCCINATE 40 MG: 40 INJECTION, POWDER, FOR SOLUTION INTRAMUSCULAR; INTRAVENOUS at 22:12

## 2017-06-14 RX ADMIN — FUROSEMIDE 40 MG: 40 TABLET ORAL at 08:31

## 2017-06-14 RX ADMIN — POTASSIUM CHLORIDE 40 MEQ: 1.5 POWDER, FOR SOLUTION ORAL at 09:18

## 2017-06-14 RX ADMIN — HEPARIN SODIUM 5000 UNITS: 5000 INJECTION, SOLUTION INTRAVENOUS; SUBCUTANEOUS at 05:36

## 2017-06-14 NOTE — PROGRESS NOTES
General Internal Medicine/Geriatrics    Patient: Celeste Yi MRN: [id number]  CSN: [id number]    YOB: 1929  Age: 80 y.o.   Sex: male    DOA: 6/13/2017 LOS:  LOS: 1 day                    Subjective:     More awake  Pleasant, cooperative  Feels better  Still coughing, dyspneic    Review of Systems:  Eyes: negative  Ears, Nose, Mouth, Throat, and Face: negative  Respiratory: + cough, wheezing and dyspnea  Cardiovascular: negative for chest pain  Gastrointestinal: negative for nausea, vomiting and diarrhea  Genitourinary:negative for dysuria and hematuria  Integument/Breast: negative  Hematologic/Lymphatic: negative for bleeding  Musculoskeletal:negative for arthralgias  Neurological: negative for weakness  Behavioral/Psychiatric: negative for behavior problems  Endocrine: negative for temperature intolerance  Allergic/Immunologic: negative for hay fever    Objective:     Physical Exam:  Patient Vitals for the past 24 hrs:   Temp Pulse Resp BP SpO2   06/14/17 0654 97.9 °F (36.6 °C) 68 17 108/77 (!) 89 %   06/14/17 0329 97.3 °F (36.3 °C) 72 18 130/86 91 %   06/14/17 0131 - - - - 90 %   06/14/17 0024 - - - - 93 %   06/13/17 2250 98.2 °F (36.8 °C) 75 17 150/78 (!) 84 %   06/13/17 2024 - - - - 95 %   06/13/17 2010 - - - - 94 %   06/13/17 1735 98.2 °F (36.8 °C) 69 20 131/69 94 %   06/13/17 1645 - 72 21 124/65 95 %   06/13/17 1630 - 77 21 135/66 94 %   06/13/17 1615 - 73 23 125/74 95 %   06/13/17 1600 - 77 24 121/84 96 %   06/13/17 1545 - 77 28 145/75 94 %   06/13/17 1530 - 89 25 (!) 174/118 92 %   06/13/17 1515 - 86 27 165/90 92 %   06/13/17 1500 - (!) 106 (!) 31 (!) 174/94 90 %   06/13/17 1445 - 76 19 145/80 96 %   06/13/17 1430 - 80 22 161/87 96 %   06/13/17 1415 - 85 25 - 92 %   06/13/17 1400 - 82 22 146/77 92 %   06/13/17 1345 - 79 22 138/85 (!) 85 %   06/13/17 1330 - 78 21 149/88 95 %   06/13/17 1315 - 80 17 161/71 94 %   06/13/17 1300 - 81 18 143/75 95 %   06/13/17 1245 - 83 22 146/78 93 % 06/13/17 1230 - 78 22 136/67 95 %   06/13/17 1228 98.7 °F (37.1 °C) - - - -   06/13/17 1215 - 84 17 127/71 93 %   06/13/17 1200 - 80 23 103/73 95 %     AF, VSS  O2 sats stabilizing  HEENT: wnl  NECK:  No venous distention or adenopathy   HEART: RRR, no rubs or gallops  LUNGS: Ovidio. Inspiratory and expiratory wheezes  ABDOMEN: soft with active BS. No tenderness, no distention, no organomegaly  EXT: warm,no edema  SKIN: Normal turgor, no breaks  NEURO: Awake, alert, non focal    Intake and Output:  Current Shift:     Last three shifts:  06/12 1901 - 06/14 0700  In: 600 [P.O.:500; I.V.:100]  Out: 1000 [Urine:1000]    Recent Results (from the past 24 hour(s))   EKG, 12 LEAD, INITIAL    Collection Time: 06/13/17 11:57 AM   Result Value Ref Range    Ventricular Rate 87 BPM    Atrial Rate 122 BPM    QRS Duration 90 ms    Q-T Interval 412 ms    QTC Calculation (Bezet) 495 ms    Calculated R Axis 53 degrees    Calculated T Axis 63 degrees    Diagnosis       Atrial fibrillation  Septal infarct (cited on or before 05-JUN-2016)  Abnormal ECG  When compared with ECG of 05-JUN-2016 23:40,  Atrial fibrillation has replaced Sinus rhythm  Questionable change in initial forces of Anteroseptal leads  Confirmed by Sameera Lake ([id number]) on 6/13/2017 7:45:89 PM     METABOLIC PANEL, COMPREHENSIVE    Collection Time: 06/13/17 12:20 PM   Result Value Ref Range    Sodium 120 (L) 136 - 145 mmol/L    Potassium 4.2 3.5 - 5.5 mmol/L    Chloride 81 (L) 100 - 108 mmol/L    CO2 26 21 - 32 mmol/L    Anion gap 13 3.0 - 18 mmol/L    Glucose 142 (H) 74 - 99 mg/dL    BUN 31 (H) 7.0 - 18 MG/DL    Creatinine 1.42 (H) 0.6 - 1.3 MG/DL    BUN/Creatinine ratio 22 (H) 12 - 20      GFR est AA 57 (L) >60 ml/min/1.73m2    GFR est non-AA 47 (L) >60 ml/min/1.73m2    Calcium 8.0 (L) 8.5 - 10.1 MG/DL    Bilirubin, total 0.9 0.2 - 1.0 MG/DL    ALT (SGPT) 118 (H) 16 - 61 U/L    AST (SGOT) 90 (H) 15 - 37 U/L    Alk.  phosphatase 124 (H) 45 - 117 U/L    Protein, total 6.2 (L) 6.4 - 8.2 g/dL    Albumin 2.8 (L) 3.4 - 5.0 g/dL    Globulin 3.4 2.0 - 4.0 g/dL    A-G Ratio 0.8 0.8 - 1.7     CBC WITH AUTOMATED DIFF    Collection Time: 06/13/17 12:20 PM   Result Value Ref Range    WBC 20.7 (H) 4.6 - 13.2 K/uL    RBC 4.53 (L) 4.70 - 5.50 M/uL    HGB 13.4 13.0 - 16.0 g/dL    HCT 38.7 36.0 - 48.0 %    MCV 85.4 74.0 - 97.0 FL    MCH 29.6 24.0 - 34.0 PG    MCHC 34.6 31.0 - 37.0 g/dL    RDW 14.3 11.6 - 14.5 %    PLATELET 674 720 - 529 K/uL    MPV 9.5 9.2 - 11.8 FL    NEUTROPHILS 90 (H) 40 - 73 %    LYMPHOCYTES 5 (L) 21 - 52 %    MONOCYTES 5 3 - 10 %    EOSINOPHILS 0 0 - 5 %    BASOPHILS 0 0 - 2 %    ABS. NEUTROPHILS 18.4 (H) 1.8 - 8.0 K/UL    ABS. LYMPHOCYTES 1.1 0.9 - 3.6 K/UL    ABS. MONOCYTES 1.1 0.05 - 1.2 K/UL    ABS. EOSINOPHILS 0.0 0.0 - 0.4 K/UL    ABS. BASOPHILS 0.0 0.0 - 0.06 K/UL    DF AUTOMATED     PRO-BNP    Collection Time: 06/13/17 12:20 PM   Result Value Ref Range    NT pro-BNP 4072 (H) 0 - 1800 PG/ML   OSMOLALITY, SERUM/PLASMA    Collection Time: 06/13/17 12:20 PM   Result Value Ref Range    Osmolality, serum/plasma 266 (L) 280 - 300 MOSM/kg H2O   TSH 3RD GENERATION    Collection Time: 06/13/17 12:20 PM   Result Value Ref Range    TSH 5.10 (H) 0.36 - 3.74 uIU/mL   POC TROPONIN-I    Collection Time: 06/13/17 12:48 PM   Result Value Ref Range    Troponin-I (POC) <0.04 0.00 - 0.08 ng/mL   POC LACTIC ACID    Collection Time: 06/13/17 12:50 PM   Result Value Ref Range    Lactic Acid (POC) 1.2 0.4 - 2.0 mmol/L   POC G3    Collection Time: 06/13/17 12:50 PM   Result Value Ref Range    Device: FACE TENT      FIO2 (POC) 0.50 %    pH (POC) 7.418 7.35 - 7.45      pCO2 (POC) 44.5 35.0 - 45.0 MMHG    pO2 (POC) 86 80 - 100 MMHG    HCO3 (POC) 28.7 (H) 22 - 26 MMOL/L    sO2 (POC) 97 92 - 97 %    Base excess (POC) 4 mmol/L    Allens test (POC) YES      Total resp. rate 16      Site RIGHT RADIAL      Patient temp.  98.7      Specimen type (POC) ARTERIAL      Performed by Chantelle LAMB WITH AUTOMATED DIFF    Collection Time: 06/14/17  5:15 AM   Result Value Ref Range    WBC 16.2 (H) 4.6 - 13.2 K/uL    RBC 4.38 (L) 4.70 - 5.50 M/uL    HGB 13.0 13.0 - 16.0 g/dL    HCT 37.6 36.0 - 48.0 %    MCV 85.8 74.0 - 97.0 FL    MCH 29.7 24.0 - 34.0 PG    MCHC 34.6 31.0 - 37.0 g/dL    RDW 14.0 11.6 - 14.5 %    PLATELET 964 645 - 621 K/uL    MPV 10.1 9.2 - 11.8 FL    NEUTROPHILS 92 (H) 40 - 73 %    LYMPHOCYTES 4 (L) 21 - 52 %    MONOCYTES 4 3 - 10 %    EOSINOPHILS 0 0 - 5 %    BASOPHILS 0 0 - 2 %    ABS. NEUTROPHILS 15.0 (H) 1.8 - 8.0 K/UL    ABS. LYMPHOCYTES 0.6 (L) 0.9 - 3.6 K/UL    ABS. MONOCYTES 0.6 0.05 - 1.2 K/UL    ABS. EOSINOPHILS 0.0 0.0 - 0.4 K/UL    ABS.  BASOPHILS 0.0 0.0 - 0.06 K/UL    DF AUTOMATED     METABOLIC PANEL, BASIC    Collection Time: 06/14/17  5:15 AM   Result Value Ref Range    Sodium 127 (L) 136 - 145 mmol/L    Potassium 3.4 (L) 3.5 - 5.5 mmol/L    Chloride 86 (L) 100 - 108 mmol/L    CO2 28 21 - 32 mmol/L    Anion gap 13 3.0 - 18 mmol/L    Glucose 137 (H) 74 - 99 mg/dL    BUN 35 (H) 7.0 - 18 MG/DL    Creatinine 1.64 (H) 0.6 - 1.3 MG/DL    BUN/Creatinine ratio 21 (H) 12 - 20      GFR est AA 48 (L) >60 ml/min/1.73m2    GFR est non-AA 40 (L) >60 ml/min/1.73m2    Calcium 7.9 (L) 8.5 - 10.1 MG/DL           Current Facility-Administered Medications   Medication Dose Route Frequency    potassium chloride (KAON 10%) 20 mEq/15 mL oral liquid 40 mEq  40 mEq Oral DAILY    heparin (porcine) injection 5,000 Units  5,000 Units SubCUTAneous Q12H    [START ON 6/15/2017] levoFLOXacin (LEVAQUIN) 750 mg in D5W IVPB  750 mg IntraVENous Q48H    pantoprazole (PROTONIX) tablet 40 mg  40 mg Oral ACB    albuterol-ipratropium (DUO-NEB) 2.5 MG-0.5 MG/3 ML  3 mL Nebulization Q4H RT    budesonide (PULMICORT) 500 mcg/2 ml nebulizer suspension  500 mcg Nebulization BID RT    arformoterol (BROVANA) neb solution 15 mcg  15 mcg Nebulization BID RT    methylPREDNISolone (PF) (SOLU-MEDROL) injection 40 mg  40 mg IntraVENous Q8H    piperacillin-tazobactam (ZOSYN) 3.375 g in 0.9% sodium chloride (MBP/ADV) 100 mL MBP##EXTENDED INFUSION##  3.375 g IntraVENous Q8H    tamsulosin (FLOMAX) capsule 0.4 mg  0.4 mg Oral QHS    finasteride (PROSCAR) tablet 5 mg  5 mg Oral DAILY    aspirin chewable tablet 81 mg  81 mg Oral DAILY    furosemide (LASIX) tablet 40 mg  40 mg Oral ACB&D    clopidogrel (PLAVIX) tablet 75 mg  75 mg Oral DAILY    levothyroxine (SYNTHROID) tablet 50 mcg  50 mcg Oral ACB    dilTIAZem CD (CARDIZEM CD) capsule 180 mg  180 mg Oral DAILY       Lab Results   Component Value Date/Time    Glucose 137 06/14/2017 05:15 AM    Glucose 142 06/13/2017 12:20 PM    Glucose 111 06/05/2016 11:36 PM    Glucose 149 02/10/2016 04:32 AM    Glucose 141 02/06/2016 06:10 AM        Assessment     Active Problems:    COPD exacerbation ([facility] [address] (2/1/2016)      Hyponatremia (6/13/2017)      Pneumonia (6/13/2017)      Acute on chronic respiratory failure with hypoxemia (HCC) (6/14/2017)      Malnutrition of moderate degree (Donnita Alfred: 60% to less than 75% of standard weight) ([facility] [address] (6/14/2017)      CHELY (acute kidney injury) ([facility] [address] (6/14/2017)      Hypokalemia (6/14/2017)        Plan     O2 sats stabilizing but still high O2 requirement  Continue same Tx. D-Dimer  Sodium improved  Hyponatremia appears dilutional  Continue fluid restriction  Replace K  TSH OK  Continue other tx.   DNR        Elizabeth Rojas MD  6/14/2017, 8:42 AM

## 2017-06-14 NOTE — PROGRESS NOTES
Bedside and Verbal shift change report received from [address] [place] [address] (offgoing nurse). Report included the following information SBAR, Kardex, Intake/Output, MAR and Recent Results. 1930-Shift assessment completed, pt resting in bed, on hi flow NC, call bell within reach, family at bedside, bed locked in low position, will continue to monitor. 2212-Scheduled medications administered, pt tolerated well.    2305-Incontinence care provided, pt had a bowel movement. 0308-Zofran administered for nausea and vomiting, will continue to monitor. 0655-Incontinence care and scheduled medications administered. Bedside and Verbal shift change report given to [address] [place] [address] (oncoming nurse) by Lakesha Garcia RN (offgoing nurse). Report included the following information SBAR, Kardex, Intake/Output, MAR and Recent Results.

## 2017-06-14 NOTE — PROGRESS NOTES
[facility]/[address]   Discharge Planning/ Assessment    Reasons for Intervention: Chart reviewed. Met with pt/dtr, verified all demographics. [facility] has MCR/ ins. [facility] Dr. Barbara Iglesias is his PCP. NOK/MPOA: Edu Silva, dtr, whom he designates can participate in his discharge process. Pt lives with his dtr, Lalo Shepard: [phone]. Has the following DME: walker, wheel chair, scooter, ramp, & home O2([employer]). Asked about SNF, dtr states he will be coming home, they take care of pt. Asked about medical transport to home, states they will transport him home. PLAN: home when medically stable. Will cont to follow for any needs. Melissa Cope RN,ext. [phone].       High Risk Criteria  [x] Yes  []No   Physician Referral  [] Yes  [x]No        Date    Nursing Referral  [] Yes  [x]No        Date    Patient/Family Request  [] Yes  [x]No        Date       Resources:    Medicare  [x] Yes  []No   Medicaid  [] Yes  [x]No   No Resources  [] Yes  [x]No   Private Insurance  [x] Yes  []No    Name/Phone Number    Other  [] Yes  [x]No        (i.e. Workman's Comp)         Prior Services:    Prior Services  [] Yes  [x]No   Home Health  [] Yes  [x]No   [address]  [] Yes  [x]No        Number of [address]  [] Yes  [x]No       Meals on Wheels  [] Yes  [x]No   Office on Aging  [] Yes  [x]No   Transportation Services  [] Yes  [x]No   Nursing Home  [] Yes  [x]No        Nursing Home Name    [address]  [] Yes  [x]No        [address] Name    Other       Information Source:      Information obtained from  [x] Patient  [] Parent   [] Guardian  [x] Child  [] Spouse   [] Significant Other/Partner   [] Friend      [] EMS    [] Nursing Home Chart          [] Other:   Chart Review  [x] Yes  []No     Family/Support System:    Patient lives with  [] Alone    [] Spouse   [] Significant Other  [x] Children  [] Caretaker   [] Parent  [] Sibling     [] Other Other Support System:    Is the patient responsible for care of others  [] Yes  [x]No   Information of person caring for patient on  discharge    Managers financial affairs independently  [] Yes  [x]No   If no, explain:      Status Prior to Admission:    Mental Status  [x] Awake  [x] Alert  [x] Oriented  [x] Quiet/Calm [] Lethargic/Sedated   [] Disoriented  [] Restless/Anxious  [] Combative   Personal Care  [x] Dependent  [] [address]  [] Requires Assistance   Meal Preparation Ability  [] Independent   [] Standby Assistance   [] Minimal Assistance   [] Moderate Assistance  [] Maximum Assistance     [x] Total Assistance   Chores  [] Independent with Chores   [] N/A Nursing Home Resident   [x] Requires Assistance   Bowel/Bladder  [] Continent  [] Catheter  [x] Incontinent  [] Ostomy Self-Care    [] Urine Diversion Self-Care  [] Maximum Assistance     [] Total Assistance   Number of Persons needed for assistance    DME at home  [] Dilma Capps  [] Brandon Capps   [] Commode    [] Bathroom/Grab Bars  [] Hospital Bed  [] Nebulizer  [x] Oxygen           [] Raised Toilet Seat  [] Shower Chair  [] Side Rails for Bed   [] Tub Transfer Bench   [x] [employer] [employer]  [] Walker, Standard      [x] Other:  Wheel chair, scooter, ramp   Vendor      Treatment Presently Receiving:    Current Treatments  [] Chemotherapy  [] Dialysis  [] Insulin  [] IVAB [x] IVF   [x] O2  [] PCA   [] PT   [x] RT   [] Tube Feedings   [] Wound Care     Psychosocial Evaluation:    Verbalized Knowledge of Disease Process  [] Patient  []Family   Coping with Disease Process  [] Patient  []Family   Requires Further Counseling Coping with Disease Process  [] Patient  []Family     Identified Projected Needs:    Home Health Aid  [] Yes  [x]No   Transportation  [] Yes  [x]No   Education  [] Yes  [x]No        Specific Education     Financial Counseling  [] Yes  [x]No   Inability to Care for Self/Will Require 24 hour care  [] Yes  [x]No   Pain Management  [] Yes  [x]No   Home Infusion Therapy  [] Yes  [x]No   Oxygen Therapy  [] Yes  [x]No   DME  [] Yes  [x]No   Long Term Care Placement  [] Yes  [x]No   Rehab  [] Yes  [x]No   Physical Therapy  [] Yes  [x]No   Needs Anticipated At This Time  [] Yes  [x]No     Intra-Hospital Referral:    [address] [facility]  [] Yes  [x]No     [] Yes  [x]No   Patient Representative  [] Yes  [x]No   Staff for Teaching Needs  [] Yes  [x]No   Specialty Teaching Needs     Diabetic Educator  [] Yes  [x]No   Referral for Diabetic Educator Needed  [] Yes  [x]No  If Yes, place order for Nutritionist or Diabetic Consult     Tentative Discharge Plan:    Home with No Services  [x] Yes  []No   Home with [address]  [] Yes  [x]No        If Yes, specify type    Home Care Program  [] Yes  [x]No        If Yes, specify type    Meals on Wheels  [] Yes  [x]No   Office of Aging  [] Yes  [x]No   NHP  [] Yes  [x]No   Return to the Nursing Home  [] Yes  [x]No   Rehab Therapy  [] Yes  [x]No   Acute Rehab  [] Yes  [x]No   Subacute Rehab  [] Yes  [x]No   Private Care  [] Yes  [x]No   Substance Abuse Referral  [] Yes  [x]No   Transportation  [] Yes  [x]No   Chore Service  [] Yes  [x]No   Inpatient Hospice  [] Yes  [x]No   OP RT  [] Yes  [x] No   OP Hemo  [] Yes  [x] No   OP PT  [] Yes  [x]No   Support Group  [] Yes  [x]No   Reach to Recovery  [] Yes  [x]No   OP Oncology Clinic  [] Yes  [x]No   Clinic Appointment  [] Yes  [x]No   DME  [] Yes  [x]No   Comments    Name of D/C Planner or  Given to Patient or Family Char Shoemaker   Phone Number Pager: [phone]        Extension Ext. [phone]. VM [phone]   Date 06-   Time    If you are discharged home, whom do you designate to participate in your discharge plan and receive any information needed?      Enter name of Vasquez Deanne8        Phone # of designee  [phone] / [phone]          Address of [facility] Patient refused to designate any           individual

## 2017-06-14 NOTE — PROGRESS NOTES
1952Bedside and Verbal shift change report given to Alexandr Fuentes Rn (oncoming nurse) by Krystal Cyr RN (offgoing nurse). Report included the following information SBAR, Kardex, MAR and Recent Results. pt on high flow oxygen at 35 l/m      2047 shift assessment done,patient has wheezes in both lungs,has no signs of distress noted      2350 due medications given,well tolerated      0040 shift reassessmen assessment,patient keeps yelling out for water every 20 minutes,been getting sips on fluid restrictions at 1200ml      0410 Shift reassessment done,,incontinence care given,no signs of distress noted      0536 due meds given,patient has been yelling out all night for water to drink     0735 Bedside and Verbal shift change report given to [address] [place] [address] (oncoming nurse) by  Alexandr Fuentes RN (offgoing nurse).  Report included the following information SBAR, Kardex, [facility] and Recent Results

## 2017-06-14 NOTE — ACP (ADVANCE CARE PLANNING)
Patient has designated __his daughter______________________ to participate in his/her discharge plan and to receive any needed information.      Name: Rebecca Wing  Address: [address] [address]  Phone number:  [phone] / [phone]

## 2017-06-14 NOTE — PROGRESS NOTES
Nutrition initial assessment/Plan of care      RECOMMENDATIONS:   1. Cardiac, Mechanical Soft, 1200 ml Fluid restriction  2. Monitor weight and PO intake  3. RD to follow     GOALS:   1. PO intake meets >75% of protein/calorie needs by 6/19  2. Weight Maintenance (+/- 1-2# dry wt )  By 6/19          ASSESSMENT:   Per BMI of 25.2, wt is classified as normal for > 71 y/o. Labs reviewed  Nutrition recommendations listed. RD to follow. Nutrition Diagnoses:   Fluid imbalance related to hypokalemia & hponatremia as evidenced by  K:  3.4,  Na: 127;     Nutrition Risk:  [] High  [x] Moderate []  Low    SUBJECTIVE/OBJECTIVE:      Visited pt, observed lunch tray 25% consumed. Pt states he has a good appetite, did not eat more of lunch D/T the room being too hot. He states he ate all of breakfast. Staff obtained a small fan & pt is more comfortable, but does not want to finish his lunch. Reports his dentures are at home, is able to chew Mechanical Soft diet. At home he eats 3 meals/ day, lives with daughter & she does the cooking. Nursing states pt only ate ~ 50% of breakfast, usually drinks fluids as opposed to eating. Will monitor PO intake. Information Obtained from:    [x] Chart Review   [x] Patient   [] Family/Caregiver   [] Nurse/Physician   [] Interdisciplinary Meeting/Rounds    Dx:  COPD, exacerbated  Diet: Cardiac, Mechanical Soft, 1200 ml Fluid restriction  Medications: [x] Reviewed    Allergies: [x] Reviewed   Encounter Diagnoses     ICD-10-CM ICD-9-CM   1. [facility] J44.1 491.21   2. Pneumonia of right lower lobe due to infectious organism J18.1 486   3.  Hyponatremia E87.1 276.1     Past Medical History:   Diagnosis Date    BPH (benign prostatic hyperplasia)     [facility]     History of blood clots     Hypercholesterolemia     Hypertension     Hypertensive cardiovascular disease     Hypothyroidism     Infection and inflammatory reaction due to indwelling urinary catheter, subsequent encounter     Peripheral vascular disease ([facility]     Pneumonia     Urinary retention     UTI (urinary tract infection)       Labs:  Lab Results   Component Value Date/Time    Sodium 127 06/14/2017 05:15 AM    Potassium 3.4 06/14/2017 05:15 AM    Chloride 86 06/14/2017 05:15 AM    CO2 28 06/14/2017 05:15 AM    Anion gap 13 06/14/2017 05:15 AM    Glucose 137 06/14/2017 05:15 AM    BUN 35 06/14/2017 05:15 AM    Creatinine 1.64 06/14/2017 05:15 AM    Calcium 7.9 06/14/2017 05:15 AM    Magnesium 2.6 02/01/2016 02:10 PM    Phosphorus 2.5 02/22/2011 03:40 AM    Albumin 2.8 06/13/2017 12:20 PM     Anthropometrics: BMI (calculated): 25.3  Last 3 Recorded Weights in this Encounter    06/13/17 1228 06/14/17 0654   Weight: 75.8 kg (167 lb) 73.4 kg (161 lb 13.1 oz)    Ht Readings from Last 1 Encounters:   06/13/17 5' 7\" (1.702 m)     Patient Vitals for the past 100 hrs:   % Diet Eaten   06/14/17 0957 50 %       IBW:  148 lb %IBW: 109% UBW: 164 lb   [x] Weight Loss [] Weight Gain [] Weight Stable    Estimated Nutrition Needs: [x] MSJ  [] Other:  Calories: 1700  kcal Based on:   [x] Actual BW    Protein:   75 g Based on:   [x] Actual BW    Fluid:       1200 ml Based on:   [x] Actual BW   fluid restriction     [x] No Cultural, Religion or ethnic dietary need identified.     [] Cultural, Religion and ethnic food preferences identified and addressed     Wt Status:  [] Normal (18.6 - 24.9) [] Underweight (<18.5) [x] Overweight (25 - 29.9) [] Mild Obesity (30 - 34.9)  [] Moderate Obesity (35 - 39.9) [] Morbid Obesity (40+)   [] Moderate Malnutrition [] Severe Malnutrition in the context of :     Nutrition Problems Identified:   [x] Suboptimal PO intake   [] Food Allergies  [] Difficulty chewing/swallowing/poor dentition  [] Constipation/Diarrhea   [] Nausea/Vomiting   [] None  [] Other:     Plan:   [x] Therapeutic Diet  []  Obtained/adjusted food preferences/tolerances and/or snacks options   []  Supplements added   [] Occupational therapy following for feeding techniques  []  HS snack added   [x]  Modify diet texture   []  Modify diet for food allergies   []  Educate patient   []  Assist with menu selection   [x]  Monitor PO intake on meal rounds   [x]  Continue inpatient monitoring and intervention   []  Participated in discharge planning/Interdisciplinary rounds/Team meetings   []  Other:     Education Needs:   [x] Not appropriate for teaching at this time due to:   [] Identified and addressed    Nutrition Monitoring and Evaluation:  [x] Continue ongoing monitoring and intervention  [] Other    Corrinne Heft  Pager: [phone]

## 2017-06-14 NOTE — PROGRESS NOTES
conducted an initial consultation and Spiritual Assessment for Marcelina Stern, who is a 80 y. o.,male. Patients Primary Language is: [place]. According to the patients EMR Church Affiliation is: Jain. The reason the Patient came to the hospital is:   Patient Active Problem List    Diagnosis Date Noted    Acute on chronic respiratory failure with hypoxemia ([facility] 06/14/2017    Malnutrition of moderate degree (Umu Adria: 60% to less than 75% of standard weight) [facility] 06/14/2017    CHELY (acute kidney injury) ([facility] 06/14/2017    Hypokalemia 06/14/2017    Hyponatremia 06/13/2017    Pneumonia 06/13/2017    Enlarged prostate 08/24/2016    Fatty liver 02/05/2016    COPD exacerbation ([facility] 02/01/2016    A-fib ([facility] 02/01/2016    Acquired hypothyroidism 02/01/2016    COPD (chronic obstructive pulmonary disease) ([facility] 07/13/2015    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 Explored issues of ab, spirituality and/or Mandaen needs while hospitalized. Listened empathically. Provided chaplaincy education. Provided information about Spiritual Care Services. Offered prayer and assurance of continued prayers on patient's behalf. Chart reviewed. The following outcomes were achieved:  Patient shared some information about their medical narrative and spiritual journey/beliefs. Patient processed feeling about current hospitalization. Patient expressed gratitude for the 's visit. Assessment:  Patient did not indicate any spiritual or Mandaen issues which require Spiritual Care Services interventions at this time. Patient does not have any Mandaen/cultural needs that will affect patients preferences in health care.     Plan:  Chaplains will continue to follow and will provide pastoral care on an as needed or requested basis.   recommends bedside caregivers page  on duty if patient shows signs of acute spiritual or emotional distress.     [facility]   Staff [address] [facility]   [phone]

## 2017-06-15 LAB
ANION GAP BLD CALC-SCNC: 13 MMOL/L (ref 3–18)
BASOPHILS # BLD AUTO: 0 K/UL (ref 0–0.06)
BASOPHILS # BLD: 0 % (ref 0–2)
BUN SERPL-MCNC: 54 MG/DL (ref 7–18)
BUN/CREAT SERPL: 27 (ref 12–20)
CALCIUM SERPL-MCNC: 8 MG/DL (ref 8.5–10.1)
CHLORIDE SERPL-SCNC: 93 MMOL/L (ref 100–108)
CO2 SERPL-SCNC: 27 MMOL/L (ref 21–32)
CREAT SERPL-MCNC: 1.98 MG/DL (ref 0.6–1.3)
DIFFERENTIAL METHOD BLD: ABNORMAL
EOSINOPHIL # BLD: 0 K/UL (ref 0–0.4)
EOSINOPHIL NFR BLD: 0 % (ref 0–5)
ERYTHROCYTE [DISTWIDTH] IN BLOOD BY AUTOMATED COUNT: 14.5 % (ref 11.6–14.5)
GLUCOSE SERPL-MCNC: 127 MG/DL (ref 74–99)
HCT VFR BLD AUTO: 38 % (ref 36–48)
HGB BLD-MCNC: 12.9 G/DL (ref 13–16)
LYMPHOCYTES # BLD AUTO: 4 % (ref 21–52)
LYMPHOCYTES # BLD: 0.7 K/UL (ref 0.9–3.6)
MCH RBC QN AUTO: 29.7 PG (ref 24–34)
MCHC RBC AUTO-ENTMCNC: 33.9 G/DL (ref 31–37)
MCV RBC AUTO: 87.4 FL (ref 74–97)
MONOCYTES # BLD: 0.8 K/UL (ref 0.05–1.2)
MONOCYTES NFR BLD AUTO: 5 % (ref 3–10)
NEUTS SEG # BLD: 14.6 K/UL (ref 1.8–8)
NEUTS SEG NFR BLD AUTO: 91 % (ref 40–73)
PLATELET # BLD AUTO: 273 K/UL (ref 135–420)
PMV BLD AUTO: 10.4 FL (ref 9.2–11.8)
POTASSIUM SERPL-SCNC: 3.8 MMOL/L (ref 3.5–5.5)
RBC # BLD AUTO: 4.35 M/UL (ref 4.7–5.5)
SODIUM SERPL-SCNC: 133 MMOL/L (ref 136–145)
WBC # BLD AUTO: 16 K/UL (ref 4.6–13.2)

## 2017-06-15 PROCEDURE — 77010033711 HC HIGH FLOW OXYGEN

## 2017-06-15 PROCEDURE — 74011000258 HC RX REV CODE- 258: Performed by: INTERNAL MEDICINE

## 2017-06-15 PROCEDURE — 74011250637 HC RX REV CODE- 250/637: Performed by: INTERNAL MEDICINE

## 2017-06-15 PROCEDURE — 80048 BASIC METABOLIC PNL TOTAL CA: CPT | Performed by: INTERNAL MEDICINE

## 2017-06-15 PROCEDURE — 36415 COLL VENOUS BLD VENIPUNCTURE: CPT | Performed by: INTERNAL MEDICINE

## 2017-06-15 PROCEDURE — 74011250637 HC RX REV CODE- 250/637

## 2017-06-15 PROCEDURE — 65660000000 HC RM CCU STEPDOWN

## 2017-06-15 PROCEDURE — 94640 AIRWAY INHALATION TREATMENT: CPT

## 2017-06-15 PROCEDURE — 74011000250 HC RX REV CODE- 250: Performed by: INTERNAL MEDICINE

## 2017-06-15 PROCEDURE — 93970 EXTREMITY STUDY: CPT

## 2017-06-15 PROCEDURE — 77030010545

## 2017-06-15 PROCEDURE — 94760 N-INVAS EAR/PLS OXIMETRY 1: CPT

## 2017-06-15 PROCEDURE — 74011250636 HC RX REV CODE- 250/636: Performed by: INTERNAL MEDICINE

## 2017-06-15 PROCEDURE — 77010033678 HC OXYGEN DAILY

## 2017-06-15 PROCEDURE — 85025 COMPLETE CBC W/AUTO DIFF WBC: CPT | Performed by: INTERNAL MEDICINE

## 2017-06-15 RX ORDER — LANOLIN ALCOHOL/MO/W.PET/CERES
CREAM (GRAM) TOPICAL
Status: COMPLETED
Start: 2017-06-15 | End: 2017-06-15

## 2017-06-15 RX ORDER — DOCUSATE SODIUM 100 MG/1
100 CAPSULE, LIQUID FILLED ORAL DAILY
Status: DISCONTINUED | OUTPATIENT
Start: 2017-06-15 | End: 2017-06-27 | Stop reason: HOSPADM

## 2017-06-15 RX ORDER — ONDANSETRON 2 MG/ML
4 INJECTION INTRAMUSCULAR; INTRAVENOUS
Status: DISCONTINUED | OUTPATIENT
Start: 2017-06-15 | End: 2017-06-27 | Stop reason: HOSPADM

## 2017-06-15 RX ORDER — LANOLIN ALCOHOL/MO/W.PET/CERES
3 CREAM (GRAM) TOPICAL
Status: DISCONTINUED | OUTPATIENT
Start: 2017-06-15 | End: 2017-06-27 | Stop reason: HOSPADM

## 2017-06-15 RX ORDER — ADHESIVE BANDAGE
30 BANDAGE TOPICAL DAILY PRN
Status: DISCONTINUED | OUTPATIENT
Start: 2017-06-15 | End: 2017-06-27 | Stop reason: HOSPADM

## 2017-06-15 RX ORDER — FACIAL-BODY WIPES
10 EACH TOPICAL ONCE
Status: COMPLETED | OUTPATIENT
Start: 2017-06-15 | End: 2017-06-15

## 2017-06-15 RX ADMIN — CLOPIDOGREL BISULFATE 75 MG: 75 TABLET ORAL at 09:26

## 2017-06-15 RX ADMIN — PIPERACILLIN SODIUM,TAZOBACTAM SODIUM 3.38 G: 3; .375 INJECTION, POWDER, FOR SOLUTION INTRAVENOUS at 09:27

## 2017-06-15 RX ADMIN — DOCUSATE SODIUM 100 MG: 100 CAPSULE, LIQUID FILLED ORAL at 09:54

## 2017-06-15 RX ADMIN — IPRATROPIUM BROMIDE AND ALBUTEROL SULFATE 3 ML: .5; 3 SOLUTION RESPIRATORY (INHALATION) at 14:34

## 2017-06-15 RX ADMIN — PANTOPRAZOLE SODIUM 40 MG: 40 TABLET, DELAYED RELEASE ORAL at 06:54

## 2017-06-15 RX ADMIN — HEPARIN SODIUM 5000 UNITS: 5000 INJECTION, SOLUTION INTRAVENOUS; SUBCUTANEOUS at 18:26

## 2017-06-15 RX ADMIN — BUDESONIDE 500 MCG: 0.5 INHALANT RESPIRATORY (INHALATION) at 20:55

## 2017-06-15 RX ADMIN — BISACODYL 10 MG: 10 SUPPOSITORY RECTAL at 09:26

## 2017-06-15 RX ADMIN — FUROSEMIDE 40 MG: 40 TABLET ORAL at 09:27

## 2017-06-15 RX ADMIN — METHYLPREDNISOLONE SODIUM SUCCINATE 40 MG: 40 INJECTION, POWDER, FOR SOLUTION INTRAMUSCULAR; INTRAVENOUS at 23:09

## 2017-06-15 RX ADMIN — BUDESONIDE 500 MCG: 0.5 INHALANT RESPIRATORY (INHALATION) at 07:54

## 2017-06-15 RX ADMIN — DILTIAZEM HYDROCHLORIDE 180 MG: 180 CAPSULE, EXTENDED RELEASE ORAL at 09:27

## 2017-06-15 RX ADMIN — PIPERACILLIN SODIUM,TAZOBACTAM SODIUM 3.38 G: 3; .375 INJECTION, POWDER, FOR SOLUTION INTRAVENOUS at 01:42

## 2017-06-15 RX ADMIN — LEVOTHYROXINE SODIUM 50 MCG: 50 TABLET ORAL at 06:54

## 2017-06-15 RX ADMIN — LEVOFLOXACIN 750 MG: 5 INJECTION, SOLUTION INTRAVENOUS at 14:12

## 2017-06-15 RX ADMIN — ARFORMOTEROL TARTRATE 15 MCG: 15 SOLUTION RESPIRATORY (INHALATION) at 21:05

## 2017-06-15 RX ADMIN — PIPERACILLIN SODIUM,TAZOBACTAM SODIUM 3.38 G: 3; .375 INJECTION, POWDER, FOR SOLUTION INTRAVENOUS at 18:26

## 2017-06-15 RX ADMIN — METHYLPREDNISOLONE SODIUM SUCCINATE 40 MG: 40 INJECTION, POWDER, FOR SOLUTION INTRAMUSCULAR; INTRAVENOUS at 14:12

## 2017-06-15 RX ADMIN — FINASTERIDE 5 MG: 5 TABLET, FILM COATED ORAL at 09:54

## 2017-06-15 RX ADMIN — METHYLPREDNISOLONE SODIUM SUCCINATE 40 MG: 40 INJECTION, POWDER, FOR SOLUTION INTRAMUSCULAR; INTRAVENOUS at 06:29

## 2017-06-15 RX ADMIN — ASPIRIN 81 MG 81 MG: 81 TABLET ORAL at 09:27

## 2017-06-15 RX ADMIN — ONDANSETRON 4 MG: 2 INJECTION INTRAMUSCULAR; INTRAVENOUS at 03:08

## 2017-06-15 RX ADMIN — TAMSULOSIN HYDROCHLORIDE 0.4 MG: 0.4 CAPSULE ORAL at 23:09

## 2017-06-15 RX ADMIN — IPRATROPIUM BROMIDE AND ALBUTEROL SULFATE 3 ML: .5; 3 SOLUTION RESPIRATORY (INHALATION) at 17:07

## 2017-06-15 RX ADMIN — IPRATROPIUM BROMIDE AND ALBUTEROL SULFATE 3 ML: .5; 3 SOLUTION RESPIRATORY (INHALATION) at 04:29

## 2017-06-15 RX ADMIN — HEPARIN SODIUM 5000 UNITS: 5000 INJECTION, SOLUTION INTRAVENOUS; SUBCUTANEOUS at 06:29

## 2017-06-15 RX ADMIN — ARFORMOTEROL TARTRATE 15 MCG: 15 SOLUTION RESPIRATORY (INHALATION) at 07:54

## 2017-06-15 RX ADMIN — MELATONIN TAB 3 MG 3 MG: 3 TAB at 01:41

## 2017-06-15 RX ADMIN — IPRATROPIUM BROMIDE AND ALBUTEROL SULFATE 3 ML: .5; 3 SOLUTION RESPIRATORY (INHALATION) at 07:54

## 2017-06-15 RX ADMIN — FUROSEMIDE 40 MG: 40 TABLET ORAL at 16:36

## 2017-06-15 RX ADMIN — Medication 3 MG: at 01:41

## 2017-06-15 RX ADMIN — IPRATROPIUM BROMIDE AND ALBUTEROL SULFATE 3 ML: .5; 3 SOLUTION RESPIRATORY (INHALATION) at 00:47

## 2017-06-15 RX ADMIN — IPRATROPIUM BROMIDE AND ALBUTEROL SULFATE 3 ML: .5; 3 SOLUTION RESPIRATORY (INHALATION) at 20:55

## 2017-06-15 NOTE — PROCEDURES
[employer]  *** FINAL REPORT ***    Name: Harjeet Escalante  MRN: [id number]    Inpatient  : 04 Mar 1929  HIS Order #: [id number]  [facility] Visit #: [id number]  Date: 15 Ron 2017    TYPE OF TEST: Peripheral Venous Testing    REASON FOR TEST  Limb swelling, Elevated D-Dimer    Right Leg:-  Deep venous thrombosis:           No  Superficial venous thrombosis:    No  Deep venous insufficiency:        Not examined  Superficial venous insufficiency: Not examined    Left Leg:-  Deep venous thrombosis:           No  Superficial venous thrombosis:    No  Deep venous insufficiency:        Not examined  Superficial venous insufficiency: Not examined      INTERPRETATION/FINDINGS  Duplex images were obtained using 2-D gray scale, color flow and  spectral doppler analysis. Right leg :  1. Deep vein(s) visualized include the common femoral, deep femoral,  proximal femoral, mid femoral, distal femoral, popliteal(above knee),  popliteal(fossa), popliteal(below knee), posterior tibial and peroneal   veins. 2. No evidence of deep venous thrombosis detected in the veins  visualized. 3. Superficial vein(s) visualized include the great saphenous and  small saphenous veins. 4. No evidence of superficial thrombosis detected. 5. Subacute occlusive thrombosis noted in the right cockett  . Left leg :  1. Deep vein(s) visualized include the common femoral, deep femoral,  proximal femoral, mid femoral, distal femoral, popliteal(above knee),  popliteal(fossa), popliteal(below knee), posterior tibial and peroneal   veins. 2. No evidence of deep venous thrombosis detected in the veins  visualized. 3. Superficial vein(s) visualized include the great saphenous and  small saphenous veins. 4. No evidence of superficial thrombosis detected. ADDITIONAL COMMENTS    I have personally reviewed the data relevant to the interpretation of  this  study.     TECHNOLOGIST: Priya Milton RDMS, RVT  Signed: 06/15/2017 04:08 PM    PHYSICIAN: Rafiq Seo.  Gayatri Moon MD  Signed: 06/16/2017 08:09 AM

## 2017-06-15 NOTE — PROGRESS NOTES
General Internal Medicine/Geriatrics    Patient: Albina Barrow MRN: [id number]  CSN: [id number]    YOB: 1929  Age: 80 y.o. Sex: male    DOA: 6/13/2017 LOS:  LOS: 2 days                    Subjective:     Constipated  Vomited X1`   awake  Pleasant, cooperative  Feels better  Still coughing, dyspneic    Review of Systems:  Eyes: negative  Ears, Nose, Mouth, Throat, and Face: negative  Respiratory: + cough, wheezing and dyspnea  Cardiovascular: negative for chest pain  Gastrointestinal: negative for nausea, vomiting and diarrhea  Genitourinary:negative for dysuria and hematuria  Integument/Breast: negative  Hematologic/Lymphatic: negative for bleeding  Musculoskeletal:negative for arthralgias  Neurological: negative for weakness  Behavioral/Psychiatric: negative for behavior problems  Endocrine: negative for temperature intolerance  Allergic/Immunologic: negative for hay fever    Objective:     Physical Exam:  Patient Vitals for the past 24 hrs:   Temp Pulse Resp BP SpO2   06/15/17 0604 98.4 °F (36.9 °C) 88 18 127/83 90 %   06/15/17 0429 - - - - 92 %   06/15/17 0209 97.8 °F (36.6 °C) 75 22 147/58 91 %   06/15/17 0047 - - - - 90 %   06/14/17 2112 97.3 °F (36.3 °C) 89 20 122/89 93 %   06/14/17 2024 - - - - 93 %   06/14/17 2015 - - - - 92 %   06/14/17 1700 98.3 °F (36.8 °C) (!) 56 24 121/59 -   06/14/17 1348 - 71 22 141/74 92 %   06/14/17 0952 98 °F (36.7 °C) 70 24 157/67 100 %   06/14/17 0833 - - - - 93 %     AF, VSS  O2 sats stabilizing  HEENT: wnl  NECK:  No venous distention or adenopathy   HEART: RRR, no rubs or gallops  LUNGS: Ovidio. Inspiratory and expiratory wheezes  ABDOMEN: soft with active BS.  No tenderness, no distention, no organomegaly  EXT: warm,no edema  SKIN: Normal turgor, no breaks  NEURO: Awake, alert, non focal    Intake and Output:  Current Shift:  06/15 0701 - 06/15 1900  In: 240 [P.O.:240]  Out: -   Last three shifts:  06/13 1901 - 06/15 0700  In: 2080 [P.O.:1680; I.V.:400]  Out: 1150 [Urine:1150]    Recent Results (from the past 24 hour(s))   D DIMER    Collection Time: 06/14/17  8:56 AM   Result Value Ref Range    D DIMER 1.87 (H) <0.46 ug/ml(FEU)   CBC WITH AUTOMATED DIFF    Collection Time: 06/15/17  5:00 AM   Result Value Ref Range    WBC 16.0 (H) 4.6 - 13.2 K/uL    RBC 4.35 (L) 4.70 - 5.50 M/uL    HGB 12.9 (L) 13.0 - 16.0 g/dL    HCT 38.0 36.0 - 48.0 %    MCV 87.4 74.0 - 97.0 FL    MCH 29.7 24.0 - 34.0 PG    MCHC 33.9 31.0 - 37.0 g/dL    RDW 14.5 11.6 - 14.5 %    PLATELET 270 519 - 815 K/uL    MPV 10.4 9.2 - 11.8 FL    NEUTROPHILS 91 (H) 40 - 73 %    LYMPHOCYTES 4 (L) 21 - 52 %    MONOCYTES 5 3 - 10 %    EOSINOPHILS 0 0 - 5 %    BASOPHILS 0 0 - 2 %    ABS. NEUTROPHILS 14.6 (H) 1.8 - 8.0 K/UL    ABS. LYMPHOCYTES 0.7 (L) 0.9 - 3.6 K/UL    ABS. MONOCYTES 0.8 0.05 - 1.2 K/UL    ABS. EOSINOPHILS 0.0 0.0 - 0.4 K/UL    ABS.  BASOPHILS 0.0 0.0 - 0.06 K/UL    DF AUTOMATED     METABOLIC PANEL, BASIC    Collection Time: 06/15/17  5:00 AM   Result Value Ref Range    Sodium 133 (L) 136 - 145 mmol/L    Potassium 3.8 3.5 - 5.5 mmol/L    Chloride 93 (L) 100 - 108 mmol/L    CO2 27 21 - 32 mmol/L    Anion gap 13 3.0 - 18 mmol/L    Glucose 127 (H) 74 - 99 mg/dL    BUN 54 (H) 7.0 - 18 MG/DL    Creatinine 1.98 (H) 0.6 - 1.3 MG/DL    BUN/Creatinine ratio 27 (H) 12 - 20      GFR est AA 39 (L) >60 ml/min/1.73m2    GFR est non-AA 32 (L) >60 ml/min/1.73m2    Calcium 8.0 (L) 8.5 - 10.1 MG/DL           Current Facility-Administered Medications   Medication Dose Route Frequency    melatonin tablet 3 mg  3 mg Oral QHS PRN    ondansetron (ZOFRAN) injection 4 mg  4 mg IntraVENous Q6H PRN    magnesium hydroxide (MILK OF MAGNESIA) 400 mg/5 mL oral suspension 30 mL  30 mL Oral DAILY PRN    docusate sodium (COLACE) capsule 100 mg  100 mg Oral DAILY    bisacodyl (DULCOLAX) suppository 10 mg  10 mg Rectal ONCE    heparin (porcine) injection 5,000 Units  5,000 Units SubCUTAneous Q12H    levoFLOXacin (LEVAQUIN) 750 mg in D5W IVPB  750 mg IntraVENous Q48H    pantoprazole (PROTONIX) tablet 40 mg  40 mg Oral ACB    albuterol-ipratropium (DUO-NEB) 2.5 MG-0.5 MG/3 ML  3 mL Nebulization Q4H RT    budesonide (PULMICORT) 500 mcg/2 ml nebulizer suspension  500 mcg Nebulization BID RT    arformoterol (BROVANA) neb solution 15 mcg  15 mcg Nebulization BID RT    methylPREDNISolone (PF) (SOLU-MEDROL) injection 40 mg  40 mg IntraVENous Q8H    piperacillin-tazobactam (ZOSYN) 3.375 g in 0.9% sodium chloride (MBP/ADV) 100 mL MBP##EXTENDED INFUSION##  3.375 g IntraVENous Q8H    tamsulosin (FLOMAX) capsule 0.4 mg  0.4 mg Oral QHS    finasteride (PROSCAR) tablet 5 mg  5 mg Oral DAILY    aspirin chewable tablet 81 mg  81 mg Oral DAILY    furosemide (LASIX) tablet 40 mg  40 mg Oral ACB&D    clopidogrel (PLAVIX) tablet 75 mg  75 mg Oral DAILY    levothyroxine (SYNTHROID) tablet 50 mcg  50 mcg Oral ACB    dilTIAZem CD (CARDIZEM CD) capsule 180 mg  180 mg Oral DAILY       Lab Results   Component Value Date/Time    Glucose 127 06/15/2017 05:00 AM    Glucose 137 06/14/2017 05:15 AM    Glucose 142 06/13/2017 12:20 PM    Glucose 111 06/05/2016 11:36 PM    Glucose 149 02/10/2016 04:32 AM        Assessment     Active Problems:    COPD exacerbation (HCC) (2/1/2016)      Hyponatremia (6/13/2017)      Pneumonia (6/13/2017)      Acute on chronic respiratory failure with hypoxemia (HCC) (6/14/2017)      Malnutrition of moderate degree (Diaz: 60% to less than 75% of standard weight) ([facility]) (6/14/2017)      CHELY (acute kidney injury) ([facility] (6/14/2017)      Hypokalemia (6/14/2017)        Plan     Laxatives  Zofran prn  O2 sats stabilizing but still high O2 requirement  Continue same Tx. D-Dimer up some, will get Duplex LE  Sodium improved  Hyponatremia appears dilutional  Continue fluid restriction  Replace K  TSH OK  Continue other tx.   DNR        Yesica Guerrero MD  6/15/2017, 8:42 AM

## 2017-06-16 ENCOUNTER — APPOINTMENT (OUTPATIENT)
Dept: GENERAL RADIOLOGY | Age: 82
DRG: 190 | End: 2017-06-16
Attending: INTERNAL MEDICINE
Payer: MEDICARE

## 2017-06-16 PROCEDURE — 74011250637 HC RX REV CODE- 250/637: Performed by: HOSPITALIST

## 2017-06-16 PROCEDURE — 74011250637 HC RX REV CODE- 250/637: Performed by: INTERNAL MEDICINE

## 2017-06-16 PROCEDURE — 74011250636 HC RX REV CODE- 250/636: Performed by: INTERNAL MEDICINE

## 2017-06-16 PROCEDURE — 71010 XR CHEST PORT: CPT

## 2017-06-16 PROCEDURE — 74011000258 HC RX REV CODE- 258: Performed by: INTERNAL MEDICINE

## 2017-06-16 PROCEDURE — 65660000000 HC RM CCU STEPDOWN

## 2017-06-16 PROCEDURE — 94640 AIRWAY INHALATION TREATMENT: CPT

## 2017-06-16 PROCEDURE — 74011000250 HC RX REV CODE- 250: Performed by: INTERNAL MEDICINE

## 2017-06-16 RX ORDER — MIRTAZAPINE 15 MG/1
15 TABLET, FILM COATED ORAL
Status: DISCONTINUED | OUTPATIENT
Start: 2017-06-16 | End: 2017-06-27 | Stop reason: HOSPADM

## 2017-06-16 RX ADMIN — PIPERACILLIN SODIUM,TAZOBACTAM SODIUM 3.38 G: 3; .375 INJECTION, POWDER, FOR SOLUTION INTRAVENOUS at 09:02

## 2017-06-16 RX ADMIN — LACTULOSE 20 G: 20 SOLUTION ORAL at 18:55

## 2017-06-16 RX ADMIN — IPRATROPIUM BROMIDE AND ALBUTEROL SULFATE 3 ML: .5; 3 SOLUTION RESPIRATORY (INHALATION) at 11:53

## 2017-06-16 RX ADMIN — DOCUSATE SODIUM 100 MG: 100 CAPSULE, LIQUID FILLED ORAL at 08:46

## 2017-06-16 RX ADMIN — METHYLPREDNISOLONE SODIUM SUCCINATE 40 MG: 40 INJECTION, POWDER, FOR SOLUTION INTRAMUSCULAR; INTRAVENOUS at 22:17

## 2017-06-16 RX ADMIN — Medication 3 MG: at 02:03

## 2017-06-16 RX ADMIN — CLOPIDOGREL BISULFATE 75 MG: 75 TABLET ORAL at 08:45

## 2017-06-16 RX ADMIN — HEPARIN SODIUM 5000 UNITS: 5000 INJECTION, SOLUTION INTRAVENOUS; SUBCUTANEOUS at 06:25

## 2017-06-16 RX ADMIN — IPRATROPIUM BROMIDE AND ALBUTEROL SULFATE 3 ML: .5; 3 SOLUTION RESPIRATORY (INHALATION) at 00:41

## 2017-06-16 RX ADMIN — FINASTERIDE 5 MG: 5 TABLET, FILM COATED ORAL at 11:06

## 2017-06-16 RX ADMIN — HEPARIN SODIUM 5000 UNITS: 5000 INJECTION, SOLUTION INTRAVENOUS; SUBCUTANEOUS at 18:55

## 2017-06-16 RX ADMIN — DILTIAZEM HYDROCHLORIDE 180 MG: 180 CAPSULE, EXTENDED RELEASE ORAL at 08:45

## 2017-06-16 RX ADMIN — METHYLPREDNISOLONE SODIUM SUCCINATE 40 MG: 40 INJECTION, POWDER, FOR SOLUTION INTRAMUSCULAR; INTRAVENOUS at 15:33

## 2017-06-16 RX ADMIN — IPRATROPIUM BROMIDE AND ALBUTEROL SULFATE 3 ML: .5; 3 SOLUTION RESPIRATORY (INHALATION) at 04:17

## 2017-06-16 RX ADMIN — IPRATROPIUM BROMIDE AND ALBUTEROL SULFATE 3 ML: .5; 3 SOLUTION RESPIRATORY (INHALATION) at 08:36

## 2017-06-16 RX ADMIN — ARFORMOTEROL TARTRATE 15 MCG: 15 SOLUTION RESPIRATORY (INHALATION) at 22:16

## 2017-06-16 RX ADMIN — FUROSEMIDE 40 MG: 40 TABLET ORAL at 15:35

## 2017-06-16 RX ADMIN — LACTULOSE 20 G: 20 SOLUTION ORAL at 12:14

## 2017-06-16 RX ADMIN — PANTOPRAZOLE SODIUM 40 MG: 40 TABLET, DELAYED RELEASE ORAL at 08:46

## 2017-06-16 RX ADMIN — PIPERACILLIN SODIUM,TAZOBACTAM SODIUM 3.38 G: 3; .375 INJECTION, POWDER, FOR SOLUTION INTRAVENOUS at 18:55

## 2017-06-16 RX ADMIN — IPRATROPIUM BROMIDE AND ALBUTEROL SULFATE 3 ML: .5; 3 SOLUTION RESPIRATORY (INHALATION) at 16:05

## 2017-06-16 RX ADMIN — PIPERACILLIN SODIUM,TAZOBACTAM SODIUM 3.38 G: 3; .375 INJECTION, POWDER, FOR SOLUTION INTRAVENOUS at 01:48

## 2017-06-16 RX ADMIN — BUDESONIDE 500 MCG: 0.5 INHALANT RESPIRATORY (INHALATION) at 08:47

## 2017-06-16 RX ADMIN — METHYLPREDNISOLONE SODIUM SUCCINATE 40 MG: 40 INJECTION, POWDER, FOR SOLUTION INTRAMUSCULAR; INTRAVENOUS at 06:25

## 2017-06-16 RX ADMIN — FUROSEMIDE 40 MG: 40 TABLET ORAL at 08:46

## 2017-06-16 RX ADMIN — ARFORMOTEROL TARTRATE 15 MCG: 15 SOLUTION RESPIRATORY (INHALATION) at 08:36

## 2017-06-16 RX ADMIN — BUDESONIDE 500 MCG: 0.5 INHALANT RESPIRATORY (INHALATION) at 22:16

## 2017-06-16 RX ADMIN — IPRATROPIUM BROMIDE AND ALBUTEROL SULFATE 3 ML: .5; 3 SOLUTION RESPIRATORY (INHALATION) at 22:16

## 2017-06-16 RX ADMIN — TAMSULOSIN HYDROCHLORIDE 0.4 MG: 0.4 CAPSULE ORAL at 22:17

## 2017-06-16 RX ADMIN — MIRTAZAPINE 15 MG: 15 TABLET, FILM COATED ORAL at 22:17

## 2017-06-16 RX ADMIN — ASPIRIN 81 MG 81 MG: 81 TABLET ORAL at 08:45

## 2017-06-16 RX ADMIN — LEVOTHYROXINE SODIUM 50 MCG: 50 TABLET ORAL at 08:45

## 2017-06-16 NOTE — ROUTINE PROCESS
Bedside and Verbal shift change report given to Hortencia Key RN (oncoming nurse) by Black Kyle RN (offgoing nurse). Report included the following information SBAR, Intake/Output, MAR, Recent Results and Med Rec Status. 2200 - Patient keeps removing electrodes. 0000 - Patient removed electrodes. 0300 - Patient's IV access showing signs of phlebitis and infilttation. IV access removed. Patient still removing electrodes, he states that they make him itch.    0500 - May from ICU inserted new IV on R hand, 22G. Tele box not restarted, due to pt refusal to keep leads on him. Bedside and Verbal shift change report given to [facility] B., RN (oncoming nurse) by Hortencia Key RN (offgoing nurse). Report included the following information SBAR, Intake/Output, MAR, Recent Results and Med Rec Status.

## 2017-06-16 NOTE — PROGRESS NOTES
)7: 20-Report received from previous nurse. Patient resting quietly in bed. 08:10-Assessment completed-see nursing flow sheet. Continue to monitor. No c/o pain/discomfort. 12:00-No change in condition. 17:00-Incontinence care done and condom catheter applied-scheduled lasix administered. Patient has been hollering outloud for his father and other people at times through this shift. Patient quiet when people are with him at bedside. TLC given and support. Dulcolax suppository administered-stool noted in rectum. No results up to this time-18:30  20:00- Report to oncoming nurse given.

## 2017-06-16 NOTE — PROGRESS NOTES
Chart reviewed. Plan remains home with daughter/family support when medically stable. Will cont to follow for further needs. Melissa Cope RN,ext. [phone].

## 2017-06-16 NOTE — PROGRESS NOTES
General Internal Medicine/Geriatrics    Patient: Chandrika Vasques MRN: [id number]  CSN: [id number]    YOB: 1929  Age: 80 y.o. Sex: male    DOA: 6/13/2017 LOS:  LOS: 3 days                    Subjective:     Constipated still  No further Vomiting   awake  Pleasant, cooperative  Feels better  Still coughing, dyspneic    Review of Systems:  Eyes: negative  Ears, Nose, Mouth, Throat, and Face: negative  Respiratory: + cough, wheezing and dyspnea  Cardiovascular: negative for chest pain  Gastrointestinal: negative for nausea, vomiting and diarrhea  Genitourinary:negative for dysuria and hematuria  Integument/Breast: negative  Hematologic/Lymphatic: negative for bleeding  Musculoskeletal:negative for arthralgias  Neurological: negative for weakness  Behavioral/Psychiatric: negative for behavior problems  Endocrine: negative for temperature intolerance  Allergic/Immunologic: negative for hay fever    Objective:     Physical Exam:  Patient Vitals for the past 24 hrs:   Temp Pulse Resp BP SpO2   06/16/17 0955 97.4 °F (36.3 °C) 77 18 128/76 91 %   06/16/17 0841 - - - - 90 %   06/16/17 0717 98.4 °F (36.9 °C) 88 18 135/85 94 %   06/16/17 0417 - - - - 94 %   06/16/17 0225 97.7 °F (36.5 °C) 93 18 114/79 91 %   06/16/17 0041 - - - - 93 %   06/15/17 2214 98.8 °F (37.1 °C) 100 18 116/72 93 %   06/15/17 2105 - - - - 93 %   06/15/17 2055 - - - - 93 %   06/15/17 1412 99 °F (37.2 °C) 75 18 140/87 93 %     AF, VSS  O2 sats stabilizing  HEENT: wnl  NECK:  No venous distention or adenopathy   HEART: RRR, no rubs or gallops  LUNGS: Ovidio. Inspiratory and expiratory wheezes  ABDOMEN: soft with active BS.  No tenderness, no distention, no organomegaly  EXT: warm,no edema  SKIN: Normal turgor, no breaks  NEURO: Awake, alert, non focal    Intake and Output:  Current Shift:  06/16 0701 - 06/16 1900  In: 400 [P.O.:400]  Out: -   Last three shifts:  06/14 1901 - 06/16 0700  In: 1490 [P.O.:940; I.V.:550]  Out: 650 [Urine:650]    No results found for this or any previous visit (from the past 24 hour(s)).         Current Facility-Administered Medications   Medication Dose Route Frequency    lactulose (CHRONULAC) solution 20 g  30 mL Oral BID    melatonin tablet 3 mg  3 mg Oral QHS PRN    ondansetron (ZOFRAN) injection 4 mg  4 mg IntraVENous Q6H PRN    magnesium hydroxide (MILK OF MAGNESIA) 400 mg/5 mL oral suspension 30 mL  30 mL Oral DAILY PRN    docusate sodium (COLACE) capsule 100 mg  100 mg Oral DAILY    heparin (porcine) injection 5,000 Units  5,000 Units SubCUTAneous Q12H    levoFLOXacin (LEVAQUIN) 750 mg in D5W IVPB  750 mg IntraVENous Q48H    pantoprazole (PROTONIX) tablet 40 mg  40 mg Oral ACB    albuterol-ipratropium (DUO-NEB) 2.5 MG-0.5 MG/3 ML  3 mL Nebulization Q4H RT    budesonide (PULMICORT) 500 mcg/2 ml nebulizer suspension  500 mcg Nebulization BID RT    arformoterol (BROVANA) neb solution 15 mcg  15 mcg Nebulization BID RT    methylPREDNISolone (PF) (SOLU-MEDROL) injection 40 mg  40 mg IntraVENous Q8H    piperacillin-tazobactam (ZOSYN) 3.375 g in 0.9% sodium chloride (MBP/ADV) 100 mL MBP##EXTENDED INFUSION##  3.375 g IntraVENous Q8H    tamsulosin (FLOMAX) capsule 0.4 mg  0.4 mg Oral QHS    finasteride (PROSCAR) tablet 5 mg  5 mg Oral DAILY    aspirin chewable tablet 81 mg  81 mg Oral DAILY    furosemide (LASIX) tablet 40 mg  40 mg Oral ACB&D    clopidogrel (PLAVIX) tablet 75 mg  75 mg Oral DAILY    levothyroxine (SYNTHROID) tablet 50 mcg  50 mcg Oral ACB    dilTIAZem CD (CARDIZEM CD) capsule 180 mg  180 mg Oral DAILY       Lab Results   Component Value Date/Time    Glucose 127 06/15/2017 05:00 AM    Glucose 137 06/14/2017 05:15 AM    Glucose 142 06/13/2017 12:20 PM    Glucose 111 06/05/2016 11:36 PM    Glucose 149 02/10/2016 04:32 AM        Assessment     Active Problems:    COPD exacerbation (HCC) (2/1/2016)      Hyponatremia (6/13/2017)      Pneumonia (6/13/2017)      Acute on chronic respiratory failure with hypoxemia ([facility] (6/14/2017)      Malnutrition of moderate degree (Zoya Finer: 60% to less than 75% of standard weight) ([facility] (6/14/2017)      CHELY (acute kidney injury) ([facility] (6/14/2017)      Hypokalemia (6/14/2017)        Plan     Add Lactulose  Zofran prn  Remeron for sleep  O2 sats stabilizing but still high O2 requirement  Continue same Tx. D-Dimer up some, Duplex LE negative  Sodium improved  Hyponatremia appears dilutional  Continue fluid restriction  Replace K  TSH OK  Continue other tx.   DNR        Bashir Cifuentes MD  6/16/2017, 8:42 AM

## 2017-06-17 LAB
ANION GAP BLD CALC-SCNC: 13 MMOL/L (ref 3–18)
BASOPHILS # BLD AUTO: 0 K/UL (ref 0–0.06)
BASOPHILS # BLD: 0 % (ref 0–2)
BUN SERPL-MCNC: 81 MG/DL (ref 7–18)
BUN/CREAT SERPL: 38 (ref 12–20)
CALCIUM SERPL-MCNC: 8.2 MG/DL (ref 8.5–10.1)
CHLORIDE SERPL-SCNC: 97 MMOL/L (ref 100–108)
CO2 SERPL-SCNC: 30 MMOL/L (ref 21–32)
CREAT SERPL-MCNC: 2.11 MG/DL (ref 0.6–1.3)
DIFFERENTIAL METHOD BLD: ABNORMAL
EOSINOPHIL # BLD: 0 K/UL (ref 0–0.4)
EOSINOPHIL NFR BLD: 0 % (ref 0–5)
ERYTHROCYTE [DISTWIDTH] IN BLOOD BY AUTOMATED COUNT: 14.7 % (ref 11.6–14.5)
GLUCOSE SERPL-MCNC: 156 MG/DL (ref 74–99)
HCT VFR BLD AUTO: 40.5 % (ref 36–48)
HGB BLD-MCNC: 13.3 G/DL (ref 13–16)
LYMPHOCYTES # BLD AUTO: 8 % (ref 21–52)
LYMPHOCYTES # BLD: 0.8 K/UL (ref 0.9–3.6)
MCH RBC QN AUTO: 29 PG (ref 24–34)
MCHC RBC AUTO-ENTMCNC: 32.8 G/DL (ref 31–37)
MCV RBC AUTO: 88.4 FL (ref 74–97)
MONOCYTES # BLD: 0.4 K/UL (ref 0.05–1.2)
MONOCYTES NFR BLD AUTO: 4 % (ref 3–10)
NEUTS SEG # BLD: 8.9 K/UL (ref 1.8–8)
NEUTS SEG NFR BLD AUTO: 88 % (ref 40–73)
PLATELET # BLD AUTO: 239 K/UL (ref 135–420)
PMV BLD AUTO: 10 FL (ref 9.2–11.8)
POTASSIUM SERPL-SCNC: 3.8 MMOL/L (ref 3.5–5.5)
RBC # BLD AUTO: 4.58 M/UL (ref 4.7–5.5)
SODIUM SERPL-SCNC: 140 MMOL/L (ref 136–145)
WBC # BLD AUTO: 10.1 K/UL (ref 4.6–13.2)

## 2017-06-17 PROCEDURE — 65660000000 HC RM CCU STEPDOWN

## 2017-06-17 PROCEDURE — 74011250636 HC RX REV CODE- 250/636: Performed by: INTERNAL MEDICINE

## 2017-06-17 PROCEDURE — 80048 BASIC METABOLIC PNL TOTAL CA: CPT | Performed by: INTERNAL MEDICINE

## 2017-06-17 PROCEDURE — 74011000250 HC RX REV CODE- 250: Performed by: INTERNAL MEDICINE

## 2017-06-17 PROCEDURE — 74011250637 HC RX REV CODE- 250/637: Performed by: INTERNAL MEDICINE

## 2017-06-17 PROCEDURE — 94640 AIRWAY INHALATION TREATMENT: CPT

## 2017-06-17 PROCEDURE — 74011000258 HC RX REV CODE- 258: Performed by: INTERNAL MEDICINE

## 2017-06-17 PROCEDURE — 85025 COMPLETE CBC W/AUTO DIFF WBC: CPT | Performed by: INTERNAL MEDICINE

## 2017-06-17 PROCEDURE — 36415 COLL VENOUS BLD VENIPUNCTURE: CPT | Performed by: INTERNAL MEDICINE

## 2017-06-17 RX ORDER — IPRATROPIUM BROMIDE AND ALBUTEROL SULFATE 2.5; .5 MG/3ML; MG/3ML
SOLUTION RESPIRATORY (INHALATION)
Status: DISPENSED
Start: 2017-06-17 | End: 2017-06-17

## 2017-06-17 RX ORDER — LEVOFLOXACIN 750 MG/1
750 TABLET ORAL
Status: DISCONTINUED | OUTPATIENT
Start: 2017-06-17 | End: 2017-06-19

## 2017-06-17 RX ADMIN — DOCUSATE SODIUM 100 MG: 100 CAPSULE, LIQUID FILLED ORAL at 09:46

## 2017-06-17 RX ADMIN — ARFORMOTEROL TARTRATE 15 MCG: 15 SOLUTION RESPIRATORY (INHALATION) at 19:21

## 2017-06-17 RX ADMIN — BUDESONIDE 500 MCG: 0.5 INHALANT RESPIRATORY (INHALATION) at 19:21

## 2017-06-17 RX ADMIN — IPRATROPIUM BROMIDE AND ALBUTEROL SULFATE 3 ML: .5; 3 SOLUTION RESPIRATORY (INHALATION) at 08:22

## 2017-06-17 RX ADMIN — METHYLPREDNISOLONE SODIUM SUCCINATE 40 MG: 40 INJECTION, POWDER, FOR SOLUTION INTRAMUSCULAR; INTRAVENOUS at 22:10

## 2017-06-17 RX ADMIN — FUROSEMIDE 40 MG: 40 TABLET ORAL at 17:48

## 2017-06-17 RX ADMIN — IPRATROPIUM BROMIDE AND ALBUTEROL SULFATE 3 ML: .5; 3 SOLUTION RESPIRATORY (INHALATION) at 03:04

## 2017-06-17 RX ADMIN — LEVOTHYROXINE SODIUM 50 MCG: 50 TABLET ORAL at 06:57

## 2017-06-17 RX ADMIN — METHYLPREDNISOLONE SODIUM SUCCINATE 40 MG: 40 INJECTION, POWDER, FOR SOLUTION INTRAMUSCULAR; INTRAVENOUS at 14:43

## 2017-06-17 RX ADMIN — FUROSEMIDE 40 MG: 40 TABLET ORAL at 06:57

## 2017-06-17 RX ADMIN — HEPARIN SODIUM 5000 UNITS: 5000 INJECTION, SOLUTION INTRAVENOUS; SUBCUTANEOUS at 06:55

## 2017-06-17 RX ADMIN — LACTULOSE 20 G: 20 SOLUTION ORAL at 09:46

## 2017-06-17 RX ADMIN — PIPERACILLIN SODIUM,TAZOBACTAM SODIUM 3.38 G: 3; .375 INJECTION, POWDER, FOR SOLUTION INTRAVENOUS at 09:05

## 2017-06-17 RX ADMIN — ARFORMOTEROL TARTRATE 15 MCG: 15 SOLUTION RESPIRATORY (INHALATION) at 08:22

## 2017-06-17 RX ADMIN — METHYLPREDNISOLONE SODIUM SUCCINATE 40 MG: 40 INJECTION, POWDER, FOR SOLUTION INTRAMUSCULAR; INTRAVENOUS at 06:56

## 2017-06-17 RX ADMIN — PIPERACILLIN SODIUM,TAZOBACTAM SODIUM 3.38 G: 3; .375 INJECTION, POWDER, FOR SOLUTION INTRAVENOUS at 17:49

## 2017-06-17 RX ADMIN — FINASTERIDE 5 MG: 5 TABLET, FILM COATED ORAL at 11:46

## 2017-06-17 RX ADMIN — IPRATROPIUM BROMIDE AND ALBUTEROL SULFATE 3 ML: .5; 3 SOLUTION RESPIRATORY (INHALATION) at 16:02

## 2017-06-17 RX ADMIN — PANTOPRAZOLE SODIUM 40 MG: 40 TABLET, DELAYED RELEASE ORAL at 06:57

## 2017-06-17 RX ADMIN — IPRATROPIUM BROMIDE AND ALBUTEROL SULFATE 3 ML: .5; 3 SOLUTION RESPIRATORY (INHALATION) at 19:21

## 2017-06-17 RX ADMIN — DILTIAZEM HYDROCHLORIDE 180 MG: 180 CAPSULE, EXTENDED RELEASE ORAL at 09:46

## 2017-06-17 RX ADMIN — MIRTAZAPINE 15 MG: 15 TABLET, FILM COATED ORAL at 22:10

## 2017-06-17 RX ADMIN — IPRATROPIUM BROMIDE AND ALBUTEROL SULFATE 3 ML: .5; 3 SOLUTION RESPIRATORY (INHALATION) at 12:18

## 2017-06-17 RX ADMIN — LACTULOSE 20 G: 20 SOLUTION ORAL at 17:49

## 2017-06-17 RX ADMIN — HEPARIN SODIUM 5000 UNITS: 5000 INJECTION, SOLUTION INTRAVENOUS; SUBCUTANEOUS at 17:49

## 2017-06-17 RX ADMIN — TAMSULOSIN HYDROCHLORIDE 0.4 MG: 0.4 CAPSULE ORAL at 22:10

## 2017-06-17 RX ADMIN — IPRATROPIUM BROMIDE AND ALBUTEROL SULFATE 3 ML: .5; 3 SOLUTION RESPIRATORY (INHALATION) at 00:11

## 2017-06-17 RX ADMIN — BUDESONIDE 500 MCG: 0.5 INHALANT RESPIRATORY (INHALATION) at 08:22

## 2017-06-17 RX ADMIN — IPRATROPIUM BROMIDE AND ALBUTEROL SULFATE 3 ML: .5; 3 SOLUTION RESPIRATORY (INHALATION) at 23:47

## 2017-06-17 RX ADMIN — CLOPIDOGREL BISULFATE 75 MG: 75 TABLET ORAL at 09:46

## 2017-06-17 RX ADMIN — ASPIRIN 81 MG 81 MG: 81 TABLET ORAL at 09:46

## 2017-06-17 RX ADMIN — LEVOFLOXACIN 750 MG: 750 TABLET, FILM COATED ORAL at 14:43

## 2017-06-17 NOTE — PROGRESS NOTES
0800- Assessment completed with no signs of distress. 1200-No signs of distress. 1600-No signs of distress. Bedside shift change report given to RN Glynn Casey (oncoming nurse) by Kareem Leach (offgoing nurse). Report included the following information SBAR.

## 2017-06-17 NOTE — PROGRESS NOTES
General Internal Medicine/Geriatrics    Patient: Maddi Egan MRN: [id number]  CSN: [id number]    YOB: 1929  Age: 80 y.o. Sex: male    DOA: 6/13/2017 LOS:  LOS: 4 days                    Subjective:     + BM  No further Vomiting   awake  Pleasant, cooperative  Feels some  better  Still coughing, dyspneic    Review of Systems:  Eyes: negative  Ears, Nose, Mouth, Throat, and Face: negative  Respiratory: + cough, wheezing and dyspnea  Cardiovascular: negative for chest pain  Gastrointestinal: negative for nausea, vomiting and diarrhea  Genitourinary:negative for dysuria and hematuria  Integument/Breast: negative  Hematologic/Lymphatic: negative for bleeding  Musculoskeletal:negative for arthralgias  Neurological: negative for weakness  Behavioral/Psychiatric: negative for behavior problems  Endocrine: negative for temperature intolerance  Allergic/Immunologic: negative for hay fever    Objective:     Physical Exam:  Patient Vitals for the past 24 hrs:   Temp Pulse Resp BP SpO2   06/17/17 1218 - - - - 98 %   06/17/17 1054 97.4 °F (36.3 °C) 90 18 146/85 (!) 88 %   06/17/17 0543 97.4 °F (36.3 °C) 77 18 154/74 90 %   06/17/17 0304 - - - - 90 %   06/17/17 0133 97.6 °F (36.4 °C) 67 22 159/78 90 %   06/16/17 2237 97.8 °F (36.6 °C) 65 20 133/72 92 %   06/16/17 1730 98 °F (36.7 °C) 76 20 137/71 92 %   06/16/17 1606 - - - - 90 %     AF, VSS  O2 sats stable  HEENT: wnl  NECK:  No venous distention or adenopathy   HEART: RRR, no rubs or gallops  LUNGS: Ovidio. Inspiratory and expiratory wheezes  ABDOMEN: soft with active BS. No tenderness, no distention, no organomegaly  EXT: warm,no edema  SKIN: Normal turgor, no breaks  NEURO: Awake, alert, non focal    Intake and Output:  Current Shift:  06/17 0701 - 06/17 1900  In: 430 [P.O.:230; I.V.:200]  Out: -   Last three shifts:  06/15 1901 - 06/17 0700  In: 1960 [P.O.:1660;  I.V.:300]  Out: 1600 [Urine:1600]    Recent Results (from the past 24 hour(s))   CBC WITH AUTOMATED DIFF    Collection Time: 06/17/17  4:35 AM   Result Value Ref Range    WBC 10.1 4.6 - 13.2 K/uL    RBC 4.58 (L) 4.70 - 5.50 M/uL    HGB 13.3 13.0 - 16.0 g/dL    HCT 40.5 36.0 - 48.0 %    MCV 88.4 74.0 - 97.0 FL    MCH 29.0 24.0 - 34.0 PG    MCHC 32.8 31.0 - 37.0 g/dL    RDW 14.7 (H) 11.6 - 14.5 %    PLATELET 499 788 - 026 K/uL    MPV 10.0 9.2 - 11.8 FL    NEUTROPHILS 88 (H) 40 - 73 %    LYMPHOCYTES 8 (L) 21 - 52 %    MONOCYTES 4 3 - 10 %    EOSINOPHILS 0 0 - 5 %    BASOPHILS 0 0 - 2 %    ABS. NEUTROPHILS 8.9 (H) 1.8 - 8.0 K/UL    ABS. LYMPHOCYTES 0.8 (L) 0.9 - 3.6 K/UL    ABS. MONOCYTES 0.4 0.05 - 1.2 K/UL    ABS. EOSINOPHILS 0.0 0.0 - 0.4 K/UL    ABS.  BASOPHILS 0.0 0.0 - 0.06 K/UL    DF AUTOMATED     METABOLIC PANEL, BASIC    Collection Time: 06/17/17  4:35 AM   Result Value Ref Range    Sodium 140 136 - 145 mmol/L    Potassium 3.8 3.5 - 5.5 mmol/L    Chloride 97 (L) 100 - 108 mmol/L    CO2 30 21 - 32 mmol/L    Anion gap 13 3.0 - 18 mmol/L    Glucose 156 (H) 74 - 99 mg/dL    BUN 81 (H) 7.0 - 18 MG/DL    Creatinine 2.11 (H) 0.6 - 1.3 MG/DL    BUN/Creatinine ratio 38 (H) 12 - 20      GFR est AA 36 (L) >60 ml/min/1.73m2    GFR est non-AA 30 (L) >60 ml/min/1.73m2    Calcium 8.2 (L) 8.5 - 10.1 MG/DL           Current Facility-Administered Medications   Medication Dose Route Frequency    levoFLOXacin (LEVAQUIN) tablet 750 mg  750 mg Oral Q48H    lactulose (CHRONULAC) solution 20 g  30 mL Oral BID    mirtazapine (REMERON) tablet 15 mg  15 mg Oral QHS    melatonin tablet 3 mg  3 mg Oral QHS PRN    ondansetron (ZOFRAN) injection 4 mg  4 mg IntraVENous Q6H PRN    magnesium hydroxide (MILK OF MAGNESIA) 400 mg/5 mL oral suspension 30 mL  30 mL Oral DAILY PRN    docusate sodium (COLACE) capsule 100 mg  100 mg Oral DAILY    heparin (porcine) injection 5,000 Units  5,000 Units SubCUTAneous Q12H    pantoprazole (PROTONIX) tablet 40 mg  40 mg Oral ACB    albuterol-ipratropium (DUO-NEB) 2.5 MG-0.5 MG/3 ML  3 mL Nebulization Q4H RT    budesonide (PULMICORT) 500 mcg/2 ml nebulizer suspension  500 mcg Nebulization BID RT    arformoterol (BROVANA) neb solution 15 mcg  15 mcg Nebulization BID RT    methylPREDNISolone (PF) (SOLU-MEDROL) injection 40 mg  40 mg IntraVENous Q8H    piperacillin-tazobactam (ZOSYN) 3.375 g in 0.9% sodium chloride (MBP/ADV) 100 mL MBP##EXTENDED INFUSION##  3.375 g IntraVENous Q8H    tamsulosin (FLOMAX) capsule 0.4 mg  0.4 mg Oral QHS    finasteride (PROSCAR) tablet 5 mg  5 mg Oral DAILY    aspirin chewable tablet 81 mg  81 mg Oral DAILY    furosemide (LASIX) tablet 40 mg  40 mg Oral ACB&D    clopidogrel (PLAVIX) tablet 75 mg  75 mg Oral DAILY    levothyroxine (SYNTHROID) tablet 50 mcg  50 mcg Oral ACB    dilTIAZem CD (CARDIZEM CD) capsule 180 mg  180 mg Oral DAILY       Lab Results   Component Value Date/Time    Glucose 156 06/17/2017 04:35 AM    Glucose 127 06/15/2017 05:00 AM    Glucose 137 06/14/2017 05:15 AM    Glucose 142 06/13/2017 12:20 PM    Glucose 111 06/05/2016 11:36 PM        Assessment     Active Problems:    COPD exacerbation ([facility] (2/1/2016)      Hyponatremia (6/13/2017)      Pneumonia (6/13/2017)      Acute on chronic respiratory failure with hypoxemia (HCC) (6/14/2017)      Malnutrition of moderate degree (Kalina Calderon: 60% to less than 75% of standard weight) ([facility] (6/14/2017)      CHELY (acute kidney injury) ([facility] (6/14/2017)      Hypokalemia (6/14/2017)        Plan     Still high O2 requirement  Continue  Lactulose  Zofran prn  Remeron for sleep  O2 sats stabilizing but still high O2 requirement  Continue same Tx. D-Dimer up some, Duplex LE negative  Sodium improved, increased fluid allowance  Hyponatremia appears dilutional  Replace K  TSH OK  Continue other tx.   DNR        Felicity Ibrahim MD  6/17/2017,

## 2017-06-17 NOTE — PROGRESS NOTES
[facility]: This patient meets P & T approved criteria for conversion from IV to oral therapy for the following medication:     Levofloxacin 750 mg IV q24h was discontinued and Levofloxacin 750 mg PO q24h was ordered. If the patient no longer meets all criteria for oral therapy, the pharmacist will switch back to IV therapy. Thanks.

## 2017-06-17 NOTE — ROUTINE PROCESS
0800-assessment completed. No signs of distress. 1200-no signs of distress. Approximately 1400, patient accidentally broke nasal cannula. Called RT for replacement. 1600-no signs of distress. Bedside shift change report given to LEONIE Francis (oncoming nurse) by Concepcion Grissom (offgoing nurse). Report included the following information SBAR.

## 2017-06-17 NOTE — PROGRESS NOTES
1940 Bedside and Verbal shift change report given to Sam Hernandez Rn (oncoming nurse) by  Nydia Munguia RN (offgoing nurse). Report included the following information SBAR, Kardex, MAR and Recent Results. 2031shift assessment done,pt on 45 l high flow oxygen,,incontinence care given,condom catheter,slipped off ,a new one in place      2216 due medications given,vitral signs checked,pts iv infiltrated,a hard stick,arms bruised,nurse sup notified and on floor,inserted 2 peripheral IVs successfully,but infiltrates anytime iv antibiotic is started      0011shift reassessment done,no changes in previous assessment     0415 Shift reassessment done,Nurse supervisor on floor, Chani Rn ,24 G peripheral iv inserted,previous dose of atibiotic zosyn continued,but current dose due now is missed,      0655 due morning meds given,patient repositioned,keep yelling out help,help and requested for water,given patient resting quietly with no signs of distress noted         0725 Bedside and Verbal shift change report given to Sonny Chowdhury  RNs (oncoming nurse) by  RN (offgoing nurse).  Report included the following information SBAR, Kardex, [facility] and Recent Results

## 2017-06-17 NOTE — PROGRESS NOTES
Rec'd pt on HFNC - pt sleeping - no distress observed  HFNC 42% Fio2 & 45lpm   -administer neb    1130-Called to bedside pt NC has broken  - replaced nosepiece & returned to HFNC (pt had NRB) when i entered room  -pt confirmed feeling SOB - by stating \"help, help\"  - after replacing cannula & titrating settings pt confirmed feels much better - sat 97% - HFNC Fio2=55%, Flow=45 lpm    1215-Pt remains on HFNC  Pt awake & alert - neb administered  -following neb - attempt to titrate down pt O2 = following titration pt stated was feeling SOB - responded \"help\" - appeared some may have been anxiety related following adjustment - pt sat 98% - readjust back to flow=50lpm, fio2=50% - coached pt on breathing & he responded well confirming he felt better    1600-Pt awake & alert - remains on HFNC:  50 lpm flow, fio2=51% -   Administer neb - no adverse reaction observed - pt confirmed to me he was comfortable  -water bag good

## 2017-06-17 NOTE — PROGRESS NOTES
Bedside and Verbal shift change report received from Catracho Tai (offgoing nurse). Report included the following information SBAR, Kardex, Intake/Output, MAR and Recent Results. 2010-Shift assessment completed, pt resting in bed, no complaints of pain currently, on HI flow NC, bed locked in low position, side rails x3, call bell within reach, will continue to monitor. 2210-Scheduled medications administered, pt tolerated well, will continue to monitor. 0156-Zosyn administered, pt resting comfortably, will continue to monitor. 0410-Reassessment completed, will continue to monitor. Bedside and Verbal shift change report given to [id number] [facility] (oncoming nurse) by Rayray Shaffer RN (offgoing nurse). Report included the following information SBAR, Kardex, Intake/Output, MAR and Recent Results.

## 2017-06-18 LAB
CK MB CFR SERPL CALC: 3.5 % (ref 0–4)
CK MB SERPL-MCNC: 1.8 NG/ML (ref 5–25)
CK SERPL-CCNC: 51 U/L (ref 39–308)
TROPONIN I SERPL-MCNC: 0.02 NG/ML (ref 0–0.04)

## 2017-06-18 PROCEDURE — 74011250636 HC RX REV CODE- 250/636: Performed by: INTERNAL MEDICINE

## 2017-06-18 PROCEDURE — 74011000258 HC RX REV CODE- 258: Performed by: INTERNAL MEDICINE

## 2017-06-18 PROCEDURE — 82550 ASSAY OF CK (CPK): CPT | Performed by: INTERNAL MEDICINE

## 2017-06-18 PROCEDURE — 94760 N-INVAS EAR/PLS OXIMETRY 1: CPT

## 2017-06-18 PROCEDURE — 36415 COLL VENOUS BLD VENIPUNCTURE: CPT | Performed by: INTERNAL MEDICINE

## 2017-06-18 PROCEDURE — 74011250637 HC RX REV CODE- 250/637: Performed by: INTERNAL MEDICINE

## 2017-06-18 PROCEDURE — 77010033678 HC OXYGEN DAILY

## 2017-06-18 PROCEDURE — 74011000250 HC RX REV CODE- 250: Performed by: INTERNAL MEDICINE

## 2017-06-18 PROCEDURE — 94640 AIRWAY INHALATION TREATMENT: CPT

## 2017-06-18 PROCEDURE — 77010033711 HC HIGH FLOW OXYGEN

## 2017-06-18 PROCEDURE — 65660000000 HC RM CCU STEPDOWN

## 2017-06-18 PROCEDURE — 74011250636 HC RX REV CODE- 250/636

## 2017-06-18 PROCEDURE — 93005 ELECTROCARDIOGRAM TRACING: CPT

## 2017-06-18 RX ORDER — MORPHINE SULFATE 2 MG/ML
1 INJECTION, SOLUTION INTRAMUSCULAR; INTRAVENOUS ONCE
Status: COMPLETED | OUTPATIENT
Start: 2017-06-18 | End: 2017-06-18

## 2017-06-18 RX ORDER — MORPHINE SULFATE 2 MG/ML
INJECTION, SOLUTION INTRAMUSCULAR; INTRAVENOUS
Status: DISPENSED
Start: 2017-06-18 | End: 2017-06-18

## 2017-06-18 RX ADMIN — HEPARIN SODIUM 5000 UNITS: 5000 INJECTION, SOLUTION INTRAVENOUS; SUBCUTANEOUS at 17:14

## 2017-06-18 RX ADMIN — PANTOPRAZOLE SODIUM 40 MG: 40 TABLET, DELAYED RELEASE ORAL at 06:43

## 2017-06-18 RX ADMIN — DILTIAZEM HYDROCHLORIDE 180 MG: 180 CAPSULE, EXTENDED RELEASE ORAL at 09:13

## 2017-06-18 RX ADMIN — TAMSULOSIN HYDROCHLORIDE 0.4 MG: 0.4 CAPSULE ORAL at 21:40

## 2017-06-18 RX ADMIN — MIRTAZAPINE 15 MG: 15 TABLET, FILM COATED ORAL at 21:40

## 2017-06-18 RX ADMIN — IPRATROPIUM BROMIDE AND ALBUTEROL SULFATE 3 ML: .5; 3 SOLUTION RESPIRATORY (INHALATION) at 04:41

## 2017-06-18 RX ADMIN — METHYLPREDNISOLONE SODIUM SUCCINATE 40 MG: 40 INJECTION, POWDER, FOR SOLUTION INTRAMUSCULAR; INTRAVENOUS at 13:16

## 2017-06-18 RX ADMIN — PIPERACILLIN SODIUM,TAZOBACTAM SODIUM 3.38 G: 3; .375 INJECTION, POWDER, FOR SOLUTION INTRAVENOUS at 01:56

## 2017-06-18 RX ADMIN — MORPHINE SULFATE 1 MG: 2 INJECTION, SOLUTION INTRAMUSCULAR; INTRAVENOUS at 11:53

## 2017-06-18 RX ADMIN — LACTULOSE 20 G: 20 SOLUTION ORAL at 09:13

## 2017-06-18 RX ADMIN — ARFORMOTEROL TARTRATE 15 MCG: 15 SOLUTION RESPIRATORY (INHALATION) at 07:42

## 2017-06-18 RX ADMIN — LACTULOSE 20 G: 20 SOLUTION ORAL at 17:13

## 2017-06-18 RX ADMIN — ARFORMOTEROL TARTRATE 15 MCG: 15 SOLUTION RESPIRATORY (INHALATION) at 19:53

## 2017-06-18 RX ADMIN — LEVOTHYROXINE SODIUM 50 MCG: 50 TABLET ORAL at 06:43

## 2017-06-18 RX ADMIN — PIPERACILLIN SODIUM,TAZOBACTAM SODIUM 3.38 G: 3; .375 INJECTION, POWDER, FOR SOLUTION INTRAVENOUS at 17:13

## 2017-06-18 RX ADMIN — IPRATROPIUM BROMIDE AND ALBUTEROL SULFATE 3 ML: .5; 3 SOLUTION RESPIRATORY (INHALATION) at 07:42

## 2017-06-18 RX ADMIN — IPRATROPIUM BROMIDE AND ALBUTEROL SULFATE 3 ML: .5; 3 SOLUTION RESPIRATORY (INHALATION) at 23:25

## 2017-06-18 RX ADMIN — ASPIRIN 81 MG 81 MG: 81 TABLET ORAL at 09:13

## 2017-06-18 RX ADMIN — METHYLPREDNISOLONE SODIUM SUCCINATE 40 MG: 40 INJECTION, POWDER, FOR SOLUTION INTRAMUSCULAR; INTRAVENOUS at 06:43

## 2017-06-18 RX ADMIN — FUROSEMIDE 40 MG: 40 TABLET ORAL at 06:53

## 2017-06-18 RX ADMIN — FUROSEMIDE 40 MG: 40 TABLET ORAL at 17:13

## 2017-06-18 RX ADMIN — METHYLPREDNISOLONE SODIUM SUCCINATE 30 MG: 40 INJECTION, POWDER, FOR SOLUTION INTRAMUSCULAR; INTRAVENOUS at 21:40

## 2017-06-18 RX ADMIN — FINASTERIDE 5 MG: 5 TABLET, FILM COATED ORAL at 13:16

## 2017-06-18 RX ADMIN — BUDESONIDE 500 MCG: 0.5 INHALANT RESPIRATORY (INHALATION) at 07:42

## 2017-06-18 RX ADMIN — HEPARIN SODIUM 5000 UNITS: 5000 INJECTION, SOLUTION INTRAVENOUS; SUBCUTANEOUS at 06:42

## 2017-06-18 RX ADMIN — PIPERACILLIN SODIUM,TAZOBACTAM SODIUM 3.38 G: 3; .375 INJECTION, POWDER, FOR SOLUTION INTRAVENOUS at 09:13

## 2017-06-18 RX ADMIN — CLOPIDOGREL BISULFATE 75 MG: 75 TABLET ORAL at 09:13

## 2017-06-18 RX ADMIN — DOCUSATE SODIUM 100 MG: 100 CAPSULE, LIQUID FILLED ORAL at 09:13

## 2017-06-18 RX ADMIN — IPRATROPIUM BROMIDE AND ALBUTEROL SULFATE 3 ML: .5; 3 SOLUTION RESPIRATORY (INHALATION) at 19:53

## 2017-06-18 RX ADMIN — BUDESONIDE 500 MCG: 0.5 INHALANT RESPIRATORY (INHALATION) at 19:53

## 2017-06-18 RX ADMIN — IPRATROPIUM BROMIDE AND ALBUTEROL SULFATE 3 ML: .5; 3 SOLUTION RESPIRATORY (INHALATION) at 15:29

## 2017-06-18 NOTE — PROGRESS NOTES
Bedside and Verbal shift change report received from [id number] [facility] (offgoing nurse). Report included the following information SBAR, Kardex, Intake/Output, MAR and Recent Results. 1920-Shift assessment completed, pt resting in bed, on hi flow nc, call bell within reach, bed locked in low position, will continue to monitor. 2140-Scheduled medications administered, pt tolerated well. 2320-Reassessment completed, pt resting in bed, will continue to monitor. 0257-Patient pulled out PIV, new piv started on left outer arm, zosyn administered, will continue to monitor patient. 0653-Scheduled medications administered, will continue to monitor. Bedside and Verbal shift change report given to [address] (oncoming nurse) by Mandi Oropeza RN (offgoing nurse). Report included the following information SBAR, Kardex, Intake/Output, MAR and Recent Results.

## 2017-06-18 NOTE — PROGRESS NOTES
0745-Rec'd pt on HFNC:  55% Fio2 & 55 lpm flow - water bag good  Pt resting - no distress observed  Administer neb    0836- Spot check on pt - pt had removed his nosepiece - was lying on side  - reconnect pt to HFNC - coached on deep breathing  -sats 91% HR 66 when left pt    1150-RRT- Respond to rapid response for chest pain  -Pt remains on HFNC:  Fio2=50%, - Sat 93% - i asked pt if he was breathing ok & he responded his breathing was ok & continues to be comfortable on HFNC  -labs ordered - discussed with Dr. Lillie Mercer will hold this neb until ensure cardiac status is stable to ensure no additional cardiac stress    1530-Pt resting on HFNC O2=55%, flow=50 lpm  -pt awoke easily when I entered room - i asked him if he was breathing well & he stated breathing was good - no SOB reported  -Administer neb   -change water bottle  --If pt can maintain sats will attempt to reduce oxygen -

## 2017-06-18 NOTE — PROGRESS NOTES
General Internal Medicine/Geriatrics    Patient: Alberto Jose MRN: [id number]  CSN: [id number]    YOB: 1929  Age: 80 y.o. Sex: male    DOA: 6/13/2017 LOS:  LOS: 5 days                    Subjective:     muinor chest pain, negative EKG, and cardiac enzymes  + BM  No further Vomiting   awake  Pleasant, cooperative  Feels some  better   coughing less , not as dyspneic    Review of Systems:  Eyes: negative  Ears, Nose, Mouth, Throat, and Face: negative  Respiratory: + cough, wheezing and dyspnea  Cardiovascular: negative for chest pain  Gastrointestinal: negative for nausea, vomiting and diarrhea  Genitourinary:negative for dysuria and hematuria  Integument/Breast: negative  Hematologic/Lymphatic: negative for bleeding  Musculoskeletal:negative for arthralgias  Neurological: negative for weakness  Behavioral/Psychiatric: negative for behavior problems  Endocrine: negative for temperature intolerance  Allergic/Immunologic: negative for hay fever    Objective:     Physical Exam:  Patient Vitals for the past 24 hrs:   Temp Pulse Resp BP SpO2   06/18/17 1153 98.4 °F (36.9 °C) 92 24 149/82 94 %   06/18/17 0952 97.5 °F (36.4 °C) 96 20 115/76 98 %   06/18/17 0742 - - - - 97 %   06/18/17 0637 97.7 °F (36.5 °C) 64 18 150/68 95 %   06/18/17 0442 - - - - 98 %   06/18/17 0124 97.3 °F (36.3 °C) 74 18 155/84 100 %   06/17/17 2350 - - - - 92 %   06/17/17 2208 97.9 °F (36.6 °C) 76 18 132/70 98 %   06/17/17 1921 - - - - 96 %   06/17/17 1857 97.9 °F (36.6 °C) 82 18 141/82 93 %   06/17/17 1602 97.4 °F (36.3 °C) 70 18 139/76 96 %     AF, VSS  O2 sats stable  HEENT: wnl  NECK:  No venous distention or adenopathy   HEART: RRR, no rubs or gallops  LUNGS: Ovidio. Inspiratory and expiratory wheezes  ABDOMEN: soft with active BS.  No tenderness, no distention, no organomegaly  EXT: warm,no edema  SKIN: Normal turgor, no breaks  NEURO: Awake, alert, non focal    Intake and Output:  Current Shift:  06/18 0701 - 06/18 1900  In: 500 [P.O.:500]  Out: 250 [Urine:250]  Last three shifts:  06/16 1901 - 06/18 0700  In: 990 [P.O.:590; I.V.:400]  Out: 2080 [Urine:2080]    Recent Results (from the past 24 hour(s))   EKG, 12 LEAD, INITIAL    Collection Time: 06/18/17 11:54 AM   Result Value Ref Range    Ventricular Rate 105 BPM    Atrial Rate 105 BPM    P-R Interval 146 ms    QRS Duration 84 ms    Q-T Interval 342 ms    QTC Calculation (Bezet) 452 ms    Calculated P Axis 5 degrees    Calculated R Axis 42 degrees    Calculated T Axis 52 degrees    Diagnosis       Sinus tachycardia with premature supraventricular complexes  ST depression, consider subendocardial injury or digitalis effect  Abnormal ECG  When compared with ECG of 13-JUN-2017 11:58,  Sinus rhythm has replaced Atrial fibrillation  Criteria for Septal infarct are no longer present  ST more depressed in Anterior leads     CARDIAC PANEL,(CK, CKMB & TROPONIN)    Collection Time: 06/18/17 12:05 PM   Result Value Ref Range    CK 51 39 - 308 U/L    CK - MB 1.8 <3.6 ng/ml    CK-MB Index 3.5 0.0 - 4.0 %    Troponin-I, Qt. 0.02 0.0 - 0.045 NG/ML           Current Facility-Administered Medications   Medication Dose Route Frequency    morphine 2 mg/mL injection        levoFLOXacin (LEVAQUIN) tablet 750 mg  750 mg Oral Q48H    lactulose (CHRONULAC) solution 20 g  30 mL Oral BID    mirtazapine (REMERON) tablet 15 mg  15 mg Oral QHS    melatonin tablet 3 mg  3 mg Oral QHS PRN    ondansetron (ZOFRAN) injection 4 mg  4 mg IntraVENous Q6H PRN    magnesium hydroxide (MILK OF MAGNESIA) 400 mg/5 mL oral suspension 30 mL  30 mL Oral DAILY PRN    docusate sodium (COLACE) capsule 100 mg  100 mg Oral DAILY    heparin (porcine) injection 5,000 Units  5,000 Units SubCUTAneous Q12H    pantoprazole (PROTONIX) tablet 40 mg  40 mg Oral ACB    albuterol-ipratropium (DUO-NEB) 2.5 MG-0.5 MG/3 ML  3 mL Nebulization Q4H RT    budesonide (PULMICORT) 500 mcg/2 ml nebulizer suspension  500 mcg Nebulization BID RT  arformoterol (BROVANA) neb solution 15 mcg  15 mcg Nebulization BID RT    methylPREDNISolone (PF) (SOLU-MEDROL) injection 40 mg  40 mg IntraVENous Q8H    piperacillin-tazobactam (ZOSYN) 3.375 g in 0.9% sodium chloride (MBP/ADV) 100 mL MBP##EXTENDED INFUSION##  3.375 g IntraVENous Q8H    tamsulosin (FLOMAX) capsule 0.4 mg  0.4 mg Oral QHS    finasteride (PROSCAR) tablet 5 mg  5 mg Oral DAILY    aspirin chewable tablet 81 mg  81 mg Oral DAILY    furosemide (LASIX) tablet 40 mg  40 mg Oral ACB&D    clopidogrel (PLAVIX) tablet 75 mg  75 mg Oral DAILY    levothyroxine (SYNTHROID) tablet 50 mcg  50 mcg Oral ACB    dilTIAZem CD (CARDIZEM CD) capsule 180 mg  180 mg Oral DAILY       Lab Results   Component Value Date/Time    Glucose 156 06/17/2017 04:35 AM    Glucose 127 06/15/2017 05:00 AM    Glucose 137 06/14/2017 05:15 AM    Glucose 142 06/13/2017 12:20 PM    Glucose 111 06/05/2016 11:36 PM        Assessment     Active Problems:    COPD exacerbation ([facility] (2/1/2016)      Hyponatremia (6/13/2017)      Pneumonia (6/13/2017)      Acute on chronic respiratory failure with hypoxemia (HCC) (6/14/2017)      Malnutrition of moderate degree (Domonique Cargo: 60% to less than 75% of standard weight) ([facility] (6/14/2017)      CHELY (acute kidney injury) ([facility] (6/14/2017)      Hypokalemia (6/14/2017)        Plan     Doubt cardiac pain  Try to decrease O2 and steroids  Continue  Lactulose  Zofran prn  Remeron for sleep  O2 sats stabilizing but still high O2 requirement  Continue same Tx. D-Dimer up some, Duplex LE negative  Sodium improved, increased fluid allowance  Hyponatremia appears dilutional  Replace K  TSH OK  Continue other tx.   DNR        Claudia Walton MD  6/18/2017,

## 2017-06-18 NOTE — ROUTINE PROCESS
Bedside, Verbal and Written shift change report given to Kailash Smith RN (oncoming nurse) by Phil Mar RN (offgoing nurse). Report included the following information SBAR, Kardex, Intake/Output, MAR, Recent Results, Med Rec Status, Procedure Summary and Cardiac Rhythm A-Fib.     0720 - Shift assessment completed. Pt alert and oriented x4. No respiratory distress noted. No c/o pain reported. Call bell within reach, bed in low position. Will continue to monitor.      1145 - RRT called on pt by Reed Howell CNA. Pt c/o chest pain and high flow nasal cannula out of pt's nose. Nasal cannula placed back in pt's nose. Dr Mini Squires at bedside, ordered EKG, Cardiac Panel, and 1 mg of Morphine. 1220 - Shift re-assessment completed, no change in pt condition.      1620 - Shift re-assessment completed, no change in pt condition.      1830 - Notified by Telemetry Monitor Technician Mauro, pt's O2 saturation desaturating and HR elevated. Checked on pt, pt's high flow nasal cannula completely off of pt. Placed pt's high flow nasal cannula back on pt, pt's O2 saturation 93%, HR 80's. 1905 - Notified by Telemetry Monitor Technician Tayler Fung, pt had Idioventricular Rhythm and converted to NSR at 1700, HR 60's-80's. Notified Dr David Vargas. Bedside, Verbal and Written shift change report given to Phil Mar RN (oncoming nurse) by Kailash Smith RN (offgoing nurse). Report included the following information SBAR, Kardex, Intake/Output, MAR, Recent Results, Med Rec Status, Procedure Summary and Cardiac Rhythm NSR.

## 2017-06-19 LAB
ATRIAL RATE: 105 BPM
CALCULATED P AXIS, ECG09: 5 DEGREES
CALCULATED R AXIS, ECG10: 42 DEGREES
CALCULATED T AXIS, ECG11: 52 DEGREES
DIAGNOSIS, 93000: NORMAL
P-R INTERVAL, ECG05: 146 MS
Q-T INTERVAL, ECG07: 342 MS
QRS DURATION, ECG06: 84 MS
QTC CALCULATION (BEZET), ECG08: 452 MS
VENTRICULAR RATE, ECG03: 105 BPM

## 2017-06-19 PROCEDURE — 74011250637 HC RX REV CODE- 250/637: Performed by: INTERNAL MEDICINE

## 2017-06-19 PROCEDURE — 77010033678 HC OXYGEN DAILY

## 2017-06-19 PROCEDURE — 77010033711 HC HIGH FLOW OXYGEN

## 2017-06-19 PROCEDURE — 74011000250 HC RX REV CODE- 250: Performed by: INTERNAL MEDICINE

## 2017-06-19 PROCEDURE — 65660000000 HC RM CCU STEPDOWN

## 2017-06-19 PROCEDURE — 74011250636 HC RX REV CODE- 250/636: Performed by: INTERNAL MEDICINE

## 2017-06-19 PROCEDURE — 94640 AIRWAY INHALATION TREATMENT: CPT

## 2017-06-19 PROCEDURE — 74011000258 HC RX REV CODE- 258: Performed by: INTERNAL MEDICINE

## 2017-06-19 PROCEDURE — 94760 N-INVAS EAR/PLS OXIMETRY 1: CPT

## 2017-06-19 PROCEDURE — 77030020186 HC BOOT HL PROTCT SAGE -B

## 2017-06-19 RX ORDER — LEVOFLOXACIN 250 MG/1
500 TABLET ORAL
Status: DISCONTINUED | OUTPATIENT
Start: 2017-06-19 | End: 2017-06-21

## 2017-06-19 RX ADMIN — FUROSEMIDE 40 MG: 40 TABLET ORAL at 18:41

## 2017-06-19 RX ADMIN — IPRATROPIUM BROMIDE AND ALBUTEROL SULFATE 3 ML: .5; 3 SOLUTION RESPIRATORY (INHALATION) at 04:48

## 2017-06-19 RX ADMIN — PANTOPRAZOLE SODIUM 40 MG: 40 TABLET, DELAYED RELEASE ORAL at 07:05

## 2017-06-19 RX ADMIN — IPRATROPIUM BROMIDE AND ALBUTEROL SULFATE 3 ML: .5; 3 SOLUTION RESPIRATORY (INHALATION) at 13:08

## 2017-06-19 RX ADMIN — ARFORMOTEROL TARTRATE 15 MCG: 15 SOLUTION RESPIRATORY (INHALATION) at 08:04

## 2017-06-19 RX ADMIN — HEPARIN SODIUM 5000 UNITS: 5000 INJECTION, SOLUTION INTRAVENOUS; SUBCUTANEOUS at 06:53

## 2017-06-19 RX ADMIN — ARFORMOTEROL TARTRATE 15 MCG: 15 SOLUTION RESPIRATORY (INHALATION) at 19:46

## 2017-06-19 RX ADMIN — BUDESONIDE 500 MCG: 0.5 INHALANT RESPIRATORY (INHALATION) at 19:46

## 2017-06-19 RX ADMIN — LEVOFLOXACIN 500 MG: 250 TABLET, FILM COATED ORAL at 18:41

## 2017-06-19 RX ADMIN — IPRATROPIUM BROMIDE AND ALBUTEROL SULFATE 3 ML: .5; 3 SOLUTION RESPIRATORY (INHALATION) at 19:46

## 2017-06-19 RX ADMIN — CLOPIDOGREL BISULFATE 75 MG: 75 TABLET ORAL at 10:54

## 2017-06-19 RX ADMIN — IPRATROPIUM BROMIDE AND ALBUTEROL SULFATE 3 ML: .5; 3 SOLUTION RESPIRATORY (INHALATION) at 08:04

## 2017-06-19 RX ADMIN — LEVOTHYROXINE SODIUM 50 MCG: 50 TABLET ORAL at 07:06

## 2017-06-19 RX ADMIN — LACTULOSE 20 G: 20 SOLUTION ORAL at 18:42

## 2017-06-19 RX ADMIN — LACTULOSE 20 G: 20 SOLUTION ORAL at 10:54

## 2017-06-19 RX ADMIN — TAMSULOSIN HYDROCHLORIDE 0.4 MG: 0.4 CAPSULE ORAL at 21:30

## 2017-06-19 RX ADMIN — MIRTAZAPINE 15 MG: 15 TABLET, FILM COATED ORAL at 21:30

## 2017-06-19 RX ADMIN — FINASTERIDE 5 MG: 5 TABLET, FILM COATED ORAL at 11:00

## 2017-06-19 RX ADMIN — IPRATROPIUM BROMIDE AND ALBUTEROL SULFATE 3 ML: .5; 3 SOLUTION RESPIRATORY (INHALATION) at 23:14

## 2017-06-19 RX ADMIN — METHYLPREDNISOLONE SODIUM SUCCINATE 30 MG: 40 INJECTION, POWDER, FOR SOLUTION INTRAMUSCULAR; INTRAVENOUS at 19:42

## 2017-06-19 RX ADMIN — PIPERACILLIN SODIUM,TAZOBACTAM SODIUM 3.38 G: 3; .375 INJECTION, POWDER, FOR SOLUTION INTRAVENOUS at 19:43

## 2017-06-19 RX ADMIN — PIPERACILLIN SODIUM,TAZOBACTAM SODIUM 3.38 G: 3; .375 INJECTION, POWDER, FOR SOLUTION INTRAVENOUS at 02:57

## 2017-06-19 RX ADMIN — ASPIRIN 81 MG 81 MG: 81 TABLET ORAL at 10:54

## 2017-06-19 RX ADMIN — BUDESONIDE 500 MCG: 0.5 INHALANT RESPIRATORY (INHALATION) at 08:04

## 2017-06-19 RX ADMIN — DILTIAZEM HYDROCHLORIDE 180 MG: 180 CAPSULE, EXTENDED RELEASE ORAL at 10:54

## 2017-06-19 RX ADMIN — HEPARIN SODIUM 5000 UNITS: 5000 INJECTION, SOLUTION INTRAVENOUS; SUBCUTANEOUS at 19:42

## 2017-06-19 RX ADMIN — IPRATROPIUM BROMIDE AND ALBUTEROL SULFATE 3 ML: .5; 3 SOLUTION RESPIRATORY (INHALATION) at 16:20

## 2017-06-19 RX ADMIN — METHYLPREDNISOLONE SODIUM SUCCINATE 30 MG: 40 INJECTION, POWDER, FOR SOLUTION INTRAMUSCULAR; INTRAVENOUS at 06:53

## 2017-06-19 RX ADMIN — FUROSEMIDE 40 MG: 40 TABLET ORAL at 07:06

## 2017-06-19 RX ADMIN — PIPERACILLIN SODIUM,TAZOBACTAM SODIUM 3.38 G: 3; .375 INJECTION, POWDER, FOR SOLUTION INTRAVENOUS at 10:54

## 2017-06-19 RX ADMIN — DOCUSATE SODIUM 100 MG: 100 CAPSULE, LIQUID FILLED ORAL at 10:54

## 2017-06-19 NOTE — PROGRESS NOTES
Patient is unable to communicate at this time.  offered prayer and left Spiritual Care brochure. Chaplains will continue to follow and will provide pastoral care on an as needed/requested basis.     [facility]   Staff [address] [place] [address]   [phone]

## 2017-06-19 NOTE — PROGRESS NOTES
General Internal Medicine/Geriatrics    Patient: Torrie Fields MRN: [id number]  CSN: [id number]    YOB: 1929  Age: 80 y.o. Sex: male    DOA: 6/13/2017 LOS:  LOS: 6 days                    Subjective:     Comfortable  No CP  No further Vomiting   awake  Pleasant, cooperative  Feels some  better   coughing less , not as dyspneic    Review of Systems:  Eyes: negative  Ears, Nose, Mouth, Throat, and Face: negative  Respiratory: + cough, wheezing and dyspnea  Cardiovascular: negative for chest pain  Gastrointestinal: negative for nausea, vomiting and diarrhea  Genitourinary:negative for dysuria and hematuria  Integument/Breast: negative  Hematologic/Lymphatic: negative for bleeding  Musculoskeletal:negative for arthralgias  Neurological: negative for weakness  Behavioral/Psychiatric: negative for behavior problems  Endocrine: negative for temperature intolerance  Allergic/Immunologic: negative for hay fever    Objective:     Physical Exam:  Patient Vitals for the past 24 hrs:   Temp Pulse Resp BP SpO2   06/19/17 0804 - - - - 96 %   06/19/17 0659 97.6 °F (36.4 °C) 63 20 149/80 94 %   06/19/17 0448 - - - - 100 %   06/19/17 0159 98.1 °F (36.7 °C) 60 20 126/88 100 %   06/18/17 2325 - - - - 97 %   06/18/17 2144 98.1 °F (36.7 °C) 87 20 159/77 99 %   06/18/17 1955 - - - - 93 %   06/18/17 1847 97.8 °F (36.6 °C) 70 20 158/69 94 %   06/18/17 1435 - - - 155/82 -   06/18/17 1425 97.8 °F (36.6 °C) 96 20 168/84 95 %   06/18/17 1153 98.4 °F (36.9 °C) 92 24 149/82 94 %   06/18/17 0952 97.5 °F (36.4 °C) 96 20 115/76 98 %     AF, VSS  O2 sats stable  HEENT: wnl  NECK:  No venous distention or adenopathy   HEART: RRR, no rubs or gallops  LUNGS: Ovidio. Inspiratory and expiratory wheezes  ABDOMEN: soft with active BS.  No tenderness, no distention, no organomegaly  EXT: warm,no edema  SKIN: Normal turgor, no breaks  NEURO: Awake, alert, non focal    Intake and Output:  Current Shift:     Last three shifts:  06/17 1901 - 06/19 0700  In: 1140 [P.O.:740;  I.V.:400]  Out: 3855 [Urine:3855]    Recent Results (from the past 24 hour(s))   EKG, 12 LEAD, INITIAL    Collection Time: 06/18/17 11:54 AM   Result Value Ref Range    Ventricular Rate 105 BPM    Atrial Rate 105 BPM    P-R Interval 146 ms    QRS Duration 84 ms    Q-T Interval 342 ms    QTC Calculation (Bezet) 452 ms    Calculated P Axis 5 degrees    Calculated R Axis 42 degrees    Calculated T Axis 52 degrees    Diagnosis       Sinus tachycardia with premature supraventricular complexes  ST depression, consider subendocardial injury or digitalis effect  Abnormal ECG  When compared with ECG of 13-JUN-2017 11:58,  Sinus rhythm has replaced Atrial fibrillation  Criteria for Septal infarct are no longer present  ST more depressed in Anterior leads     CARDIAC PANEL,(CK, CKMB & TROPONIN)    Collection Time: 06/18/17 12:05 PM   Result Value Ref Range    CK 51 39 - 308 U/L    CK - MB 1.8 <3.6 ng/ml    CK-MB Index 3.5 0.0 - 4.0 %    Troponin-I, Qt. 0.02 0.0 - 0.045 NG/ML           Current Facility-Administered Medications   Medication Dose Route Frequency    methylPREDNISolone (PF) (SOLU-MEDROL) injection 30 mg  30 mg IntraVENous Q8H    levoFLOXacin (LEVAQUIN) tablet 750 mg  750 mg Oral Q48H    lactulose (CHRONULAC) solution 20 g  30 mL Oral BID    mirtazapine (REMERON) tablet 15 mg  15 mg Oral QHS    melatonin tablet 3 mg  3 mg Oral QHS PRN    ondansetron (ZOFRAN) injection 4 mg  4 mg IntraVENous Q6H PRN    magnesium hydroxide (MILK OF MAGNESIA) 400 mg/5 mL oral suspension 30 mL  30 mL Oral DAILY PRN    docusate sodium (COLACE) capsule 100 mg  100 mg Oral DAILY    heparin (porcine) injection 5,000 Units  5,000 Units SubCUTAneous Q12H    pantoprazole (PROTONIX) tablet 40 mg  40 mg Oral ACB    albuterol-ipratropium (DUO-NEB) 2.5 MG-0.5 MG/3 ML  3 mL Nebulization Q4H RT    budesonide (PULMICORT) 500 mcg/2 ml nebulizer suspension  500 mcg Nebulization BID RT    arformoterol (BROVANA) neb solution 15 mcg  15 mcg Nebulization BID RT    piperacillin-tazobactam (ZOSYN) 3.375 g in 0.9% sodium chloride (MBP/ADV) 100 mL MBP##EXTENDED INFUSION##  3.375 g IntraVENous Q8H    tamsulosin (FLOMAX) capsule 0.4 mg  0.4 mg Oral QHS    finasteride (PROSCAR) tablet 5 mg  5 mg Oral DAILY    aspirin chewable tablet 81 mg  81 mg Oral DAILY    furosemide (LASIX) tablet 40 mg  40 mg Oral ACB&D    clopidogrel (PLAVIX) tablet 75 mg  75 mg Oral DAILY    levothyroxine (SYNTHROID) tablet 50 mcg  50 mcg Oral ACB    dilTIAZem CD (CARDIZEM CD) capsule 180 mg  180 mg Oral DAILY       Lab Results   Component Value Date/Time    Glucose 156 06/17/2017 04:35 AM    Glucose 127 06/15/2017 05:00 AM    Glucose 137 06/14/2017 05:15 AM    Glucose 142 06/13/2017 12:20 PM    Glucose 111 06/05/2016 11:36 PM        Assessment     Active Problems:    COPD exacerbation ([facility] (2/1/2016)      Hyponatremia (6/13/2017)      Pneumonia (6/13/2017)      Acute on chronic respiratory failure with hypoxemia (HCC) (6/14/2017)      Malnutrition of moderate degree (Judah Pupa: 60% to less than 75% of standard weight) ([address] (6/14/2017)      CHELY (acute kidney injury) ([facility] (6/14/2017)      Hypokalemia (6/14/2017)        Plan     Doubt cardiac pain  Decrease O2  Continue  Lactulose  Zofran prn  Remeron for sleep  O2 sats stabilizing but still high O2 requirement  Continue same Tx. D-Dimer up some, Duplex LE negative  Sodium improved, increased fluid allowance  Hyponatremia appears dilutional  Replace K  TSH OK  Continue other tx.   DNR        Jv Gamez MD  6/19/2017,

## 2017-06-19 NOTE — ROUTINE PROCESS
0725  Bedside and Verbal shift change report given to Johnny White (oncoming nurse) by Christian Jenkins (offgoing nurse). Report included the following information SBAR, Kardex, Intake/Output and MAR.     1000  Shift assessment complete and due meds given. Pt has no complaints at this time    1100  Notified by tele of pt in [facility]. Dr. Jj Barrett paged and notified. No orders received    1500  Reassessment complete. No change in pt condition    1900  Bedside and Verbal shift change report given to Reji MOROCHO (oncoming nurse) by Johnny White (offgoing nurse). Report included the following information SBAR, Kardex, Intake/Output and MAR.

## 2017-06-19 NOTE — PROGRESS NOTES
Nutrition follow up/  Plan of care      RECOMMENDATIONS:     1. Cardiac, Mechanical Soft, 1500 ml Fluid restriction  2. Ensure Enlive daily  3. Monitor weight, labs and PO intake  4. RD to follow     GOALS:     1. Ongoing: PO intake meets >75% of protein/calorie needs by 6/24  2. Ongoing: Weight Maintenance (+/- 1-2# dry wt by 6/24)        ASSESSMENT:     Weight status is classified as normal per BMI of 24.3. PO intake is fair. Labs reviewed. Nutrition recommendations listed. RD to follow. Nutrition Risk:  [] High  [x] Moderate []  Low    SUBJECTIVE/OBJECTIVE:      Patient with 50% intake of breakfast meal this morning. Variable intake per vitals. Reports his dentures are at home, is able to chew Mechanical Soft diet. Encouraged adequate intake. Will monitor. Information Obtained from:    [x] Chart Review   [x] Patient   [] Family/Caregiver   [] Nurse/Physician   [] Interdisciplinary Meeting/Rounds    Diet: Cardiac, Mechanical Soft, 1500 ml Fluid restriction  Medications: [x] Reviewed    Allergies: [x] Reviewed   Encounter Diagnoses     ICD-10-CM ICD-9-CM   1. COPD exacerbation ([facility] J44.1 491.21   2. Pneumonia of right lower lobe due to infectious organism J18.1 486   3.  Hyponatremia E87.1 276.1     Past Medical History:   Diagnosis Date    BPH (benign prostatic hyperplasia)     COPD (chronic obstructive pulmonary disease) ([facility]     History of blood clots     Hypercholesterolemia     Hypertension     Hypertensive cardiovascular disease     Hypothyroidism     Infection and inflammatory reaction due to indwelling urinary catheter, subsequent encounter     Peripheral vascular disease ([facility]     Pneumonia     Urinary retention     UTI (urinary tract infection)       Labs:    Lab Results   Component Value Date/Time    Sodium 140 06/17/2017 04:35 AM    Potassium 3.8 06/17/2017 04:35 AM    Chloride 97 06/17/2017 04:35 AM    CO2 30 06/17/2017 04:35 AM    Anion gap 13 06/17/2017 04:35 AM    Glucose 156 06/17/2017 04:35 AM    BUN 81 06/17/2017 04:35 AM    Creatinine 2.11 06/17/2017 04:35 AM    Calcium 8.2 06/17/2017 04:35 AM    Magnesium 2.6 02/01/2016 02:10 PM    Phosphorus 2.5 02/22/2011 03:40 AM    Albumin 2.8 06/13/2017 12:20 PM     Anthropometrics: BMI (calculated): 24.3  Last 3 Recorded Weights in this Encounter    06/17/17 0543 06/18/17 0636 06/19/17 0646   Weight: 72.9 kg (160 lb 11.5 oz) 62.7 kg (138 lb 3.7 oz) 70.3 kg (154 lb 15.7 oz)      Ht Readings from Last 1 Encounters:   06/17/17 5' 7\" (1.702 m)     Patient Vitals for the past 100 hrs:   % Diet Eaten   06/19/17 0945 50 %   06/18/17 1852 50 %   06/18/17 1316 25 %   06/18/17 0914 100 %   06/17/17 1749 100 %   06/17/17 1300 25 %   06/17/17 1055 100 %   06/17/17 0800 50 %   06/16/17 1731 15 %   06/16/17 1349 20 %   06/16/17 1229 25 %   06/16/17 0956 25 %     [x] Weight Loss [] Weight Gain [] Weight Stable    Nutrition Needs:   Calories: 1700 Kcal  Protein:   75 g      [x] No Cultural, Quaker or ethnic dietary need identified.     [] Cultural, Quaker and ethnic food preferences identified and addressed     Wt Status:  [x] Normal (18.6 - 24.9) [] Underweight (<18.5) [] Overweight (25 - 29.9) [] Mild Obesity (30 - 34.9)  [] Moderate Obesity (35 - 39.9) [] Morbid Obesity (40+)     Nutrition Problems Identified:   [x] Suboptimal PO intake   [] Food Allergies  [x] Difficulty chewing/swallowing/poor dentition  [] Constipation/Diarrhea   [] Nausea/Vomiting   [] None  [] Other:     Plan:   [x] Therapeutic Diet  []  Obtained/adjusted food preferences/tolerances and/or snacks options   [x]  Supplements added   [] Occupational therapy following for feeding techniques  []  HS snack added   [x]  Modify diet texture   []  Modify diet for food allergies   []  Educate patient   []  Assist with menu selection   [x]  Monitor PO intake on meal rounds   [x]  Continue inpatient monitoring and intervention   []  Participated in discharge planning/Interdisciplinary rounds/Team meetings   []  Other:     Education Needs:   [x] Not appropriate for teaching at this time    [] Identified and addressed    Nutrition Monitoring and Evaluation:  [x] Continue ongoing monitoring and intervention  [] Other    Christal Rodarte  Pager: [phone]

## 2017-06-19 NOTE — ROUTINE PROCESS
Plan:  To promote relaxation. Implementation:  Provided live bedside harp music, heartbeat paced selections for relaxation. Evaluation:  When playing in an adjacent room, I could hear patient calling out \"Mimi! \"  I offered to play music to keep him occupied while he was waiting for Hyun Smith, to which he agreed. Beth Pierrey asleep soon after I started playing and remained asleep as I left.

## 2017-06-19 NOTE — PROGRESS NOTES
Rec'd pt on HFNC:  Fio2=42%, Flow=50 lpm  No resp distress observed - pt confirmed his breathing was comfortable  -administer neb    0840- Called to bedside - pt feels having difficulty breathing - some appeared anxiety related as well  - coached pt on slow, deep breathing - pt indicated that his nosepiece may have come out few minutes ago  -re-adjusted pt position - HOB elevated & continued coaching - added a little flow to make pt more comfortable  - pt sat 97% - pt confirmed is feeling better - pt began resting with eyes closed - appeared comfortable  HFNC adj settings: Flow=53 lpm, Fio2=47%    0930-Wean HFNC O2= 45%, pt holding sats well at 97%, continued wean to 40% Fio2; 45 lpm flow  -pt resting - appears to be tolerating well - will continue to monitor     1020-Wean Fio2=35% - pt sats 95-96% - resting, no distress observed    1250-Attempt to administer neb - pt being changed will need to return when completed    1305-Pt now resting on HFNC -Fio2=33%, flow=45 lpm  -pt confirmed breathing ok, administer neb    1620- Pt remains on HFNC -Fio2=35%, Flow=45 lpm, pt confirmed no resp distress - confirmed breathing ok  Administer neb

## 2017-06-19 NOTE — PROGRESS NOTES
Chart reviewed. Plan remains home with his dtr/family support when medically stable. Will cont to follow for any needs. Melissa Cope RN,ext. [phone].

## 2017-06-19 NOTE — PROGRESS NOTES

## 2017-06-19 NOTE — PROGRESS NOTES
Pharmacy adjusting dose for Levofloxacin (Levaquin) per renal protocol. Was on a regimen of: 750 mg PO q48h. Labs:   Serum Creatinine - 2.11 mg/dl  CrCl - 22.6 ml/min    Plan:  Changed Levofloxacin to 500 mg PO q48h. Pharmacy to follow and will redose based on renal function changes. Thanks.

## 2017-06-20 PROCEDURE — 65660000000 HC RM CCU STEPDOWN

## 2017-06-20 PROCEDURE — 77010033711 HC HIGH FLOW OXYGEN

## 2017-06-20 PROCEDURE — 74011000250 HC RX REV CODE- 250: Performed by: INTERNAL MEDICINE

## 2017-06-20 PROCEDURE — 74011250637 HC RX REV CODE- 250/637: Performed by: HOSPITALIST

## 2017-06-20 PROCEDURE — 74011000258 HC RX REV CODE- 258: Performed by: INTERNAL MEDICINE

## 2017-06-20 PROCEDURE — 74011250636 HC RX REV CODE- 250/636: Performed by: INTERNAL MEDICINE

## 2017-06-20 PROCEDURE — 94760 N-INVAS EAR/PLS OXIMETRY 1: CPT

## 2017-06-20 PROCEDURE — 74011250637 HC RX REV CODE- 250/637: Performed by: INTERNAL MEDICINE

## 2017-06-20 PROCEDURE — 77010033678 HC OXYGEN DAILY

## 2017-06-20 PROCEDURE — 94640 AIRWAY INHALATION TREATMENT: CPT

## 2017-06-20 RX ADMIN — FUROSEMIDE 40 MG: 40 TABLET ORAL at 16:16

## 2017-06-20 RX ADMIN — CLOPIDOGREL BISULFATE 75 MG: 75 TABLET ORAL at 08:42

## 2017-06-20 RX ADMIN — IPRATROPIUM BROMIDE AND ALBUTEROL SULFATE 3 ML: .5; 3 SOLUTION RESPIRATORY (INHALATION) at 15:52

## 2017-06-20 RX ADMIN — IPRATROPIUM BROMIDE AND ALBUTEROL SULFATE 3 ML: .5; 3 SOLUTION RESPIRATORY (INHALATION) at 07:49

## 2017-06-20 RX ADMIN — LEVOTHYROXINE SODIUM 50 MCG: 50 TABLET ORAL at 08:42

## 2017-06-20 RX ADMIN — HEPARIN SODIUM 5000 UNITS: 5000 INJECTION, SOLUTION INTRAVENOUS; SUBCUTANEOUS at 18:45

## 2017-06-20 RX ADMIN — PIPERACILLIN SODIUM,TAZOBACTAM SODIUM 3.38 G: 3; .375 INJECTION, POWDER, FOR SOLUTION INTRAVENOUS at 18:44

## 2017-06-20 RX ADMIN — METHYLPREDNISOLONE SODIUM SUCCINATE 20 MG: 40 INJECTION, POWDER, FOR SOLUTION INTRAMUSCULAR; INTRAVENOUS at 16:17

## 2017-06-20 RX ADMIN — ARFORMOTEROL TARTRATE 15 MCG: 15 SOLUTION RESPIRATORY (INHALATION) at 20:09

## 2017-06-20 RX ADMIN — FINASTERIDE 5 MG: 5 TABLET, FILM COATED ORAL at 09:15

## 2017-06-20 RX ADMIN — MIRTAZAPINE 15 MG: 15 TABLET, FILM COATED ORAL at 22:20

## 2017-06-20 RX ADMIN — IPRATROPIUM BROMIDE AND ALBUTEROL SULFATE 3 ML: .5; 3 SOLUTION RESPIRATORY (INHALATION) at 11:46

## 2017-06-20 RX ADMIN — ARFORMOTEROL TARTRATE 15 MCG: 15 SOLUTION RESPIRATORY (INHALATION) at 07:49

## 2017-06-20 RX ADMIN — METHYLPREDNISOLONE SODIUM SUCCINATE 20 MG: 40 INJECTION, POWDER, FOR SOLUTION INTRAMUSCULAR; INTRAVENOUS at 22:20

## 2017-06-20 RX ADMIN — LACTULOSE 20 G: 20 SOLUTION ORAL at 08:42

## 2017-06-20 RX ADMIN — PIPERACILLIN SODIUM,TAZOBACTAM SODIUM 3.38 G: 3; .375 INJECTION, POWDER, FOR SOLUTION INTRAVENOUS at 12:16

## 2017-06-20 RX ADMIN — IPRATROPIUM BROMIDE AND ALBUTEROL SULFATE 3 ML: .5; 3 SOLUTION RESPIRATORY (INHALATION) at 19:55

## 2017-06-20 RX ADMIN — PIPERACILLIN SODIUM,TAZOBACTAM SODIUM 3.38 G: 3; .375 INJECTION, POWDER, FOR SOLUTION INTRAVENOUS at 01:10

## 2017-06-20 RX ADMIN — PANTOPRAZOLE SODIUM 40 MG: 40 TABLET, DELAYED RELEASE ORAL at 08:41

## 2017-06-20 RX ADMIN — ASPIRIN 81 MG 81 MG: 81 TABLET ORAL at 08:41

## 2017-06-20 RX ADMIN — BUDESONIDE 500 MCG: 0.5 INHALANT RESPIRATORY (INHALATION) at 07:49

## 2017-06-20 RX ADMIN — TAMSULOSIN HYDROCHLORIDE 0.4 MG: 0.4 CAPSULE ORAL at 22:20

## 2017-06-20 RX ADMIN — IPRATROPIUM BROMIDE AND ALBUTEROL SULFATE 3 ML: .5; 3 SOLUTION RESPIRATORY (INHALATION) at 04:12

## 2017-06-20 RX ADMIN — LACTULOSE 20 G: 20 SOLUTION ORAL at 18:44

## 2017-06-20 RX ADMIN — BUDESONIDE 500 MCG: 0.5 INHALANT RESPIRATORY (INHALATION) at 19:55

## 2017-06-20 RX ADMIN — METHYLPREDNISOLONE SODIUM SUCCINATE 30 MG: 40 INJECTION, POWDER, FOR SOLUTION INTRAMUSCULAR; INTRAVENOUS at 05:19

## 2017-06-20 RX ADMIN — DOCUSATE SODIUM 100 MG: 100 CAPSULE, LIQUID FILLED ORAL at 08:41

## 2017-06-20 RX ADMIN — FUROSEMIDE 40 MG: 40 TABLET ORAL at 08:40

## 2017-06-20 RX ADMIN — HEPARIN SODIUM 5000 UNITS: 5000 INJECTION, SOLUTION INTRAVENOUS; SUBCUTANEOUS at 05:19

## 2017-06-20 RX ADMIN — Medication 3 MG: at 22:20

## 2017-06-20 RX ADMIN — DILTIAZEM HYDROCHLORIDE 180 MG: 180 CAPSULE, EXTENDED RELEASE ORAL at 08:40

## 2017-06-20 NOTE — PROGRESS NOTES
Patient received Communion from [address] [place] [address] [employer]   Board Certified   [phone] - Office

## 2017-06-20 NOTE — PROGRESS NOTES
1935 - Notified from Day RN that pt pulled out IV and ask if I could place new one. New IV placed and IV meds given. 2230 - Pt pulled out IV    0115 - New IV placed, wrapped in Kerlix.

## 2017-06-20 NOTE — ROUTINE PROCESS
Bedside and Verbal shift change report given to [address] [place] [address] (oncoming nurse) by Jennifer Castro nurseTimothy. Report included the following information SBAR, Kardex, Intake/Output, MAR, Recent Results and Cardiac Rhythm Sinus Rhythm with continuous pulse ox. Blaine Gordillo [phone] Dr. Silvano Velazquez decreased pt's O2 to 30 L and requested that pt be ambulated out of bed today. 0900 Pt ambulated with two person assist to bedside chair for breakfast. Pt desated to 85% upon ambulation. Pt exhibited some bilateral weakness to lower extremities. Increased pt's O2 to 40 to assist in recovery. 3947 Decreased Pt's oxygen back to 30L.     0920 Pt currently SAT'ing at 88% on 30L. Pt is sitting at bedside with no complaints of pain and stable while eating breakfast.     1036 Pt ambulated back to bed with two person assist. Pt currently SAT'ing at 91% on 31 L O2.     1300 Pt resting in bed with no complaints of pain and no change to condition. Pt refused to ambulate to bedside chair for lunch but opted to stay in bed. Will continue to monitor. O2 SAT currently 90% on 30L.    1600 Pt resting in bed with no complaints of pain and no change to condition. Will continue to monitor. O2 SAT currently 89% on 30L.     1820 Pt refused to ambulate to chair for dinner. Pt also refused dinner. Pt states that he is not hungry. Will continue to monitor. Bedside and Verbal shift change report given to Veronica MOROCHO (oncoming nurse) by [address] [place] [address] (offgoing nurse). Report included the following information SBAR, Kardex, Intake/Output, MAR, Recent Results and Cardiac Rhythm PAC's and PVC's. Blaine Gordillo

## 2017-06-20 NOTE — PROGRESS NOTES
General Internal Medicine/Geriatrics    Patient: Marcelina Stern MRN: [id number]  CSN: [id number]    YOB: 1929  Age: 80 y.o. Sex: male    DOA: 6/13/2017 LOS:  LOS: 7 days                    Subjective:     Less dyspneic  Comfortable  No CP  No further Vomiting   awake  Pleasant, cooperative  Feels some  better   coughing less     Review of Systems:  Eyes: negative  Ears, Nose, Mouth, Throat, and Face: negative  Respiratory: + cough, wheezing and dyspnea  Cardiovascular: negative for chest pain  Gastrointestinal: negative for nausea, vomiting and diarrhea  Genitourinary:negative for dysuria and hematuria  Integument/Breast: negative  Hematologic/Lymphatic: negative for bleeding  Musculoskeletal:negative for arthralgias  Neurological: negative for weakness  Behavioral/Psychiatric: negative for behavior problems  Endocrine: negative for temperature intolerance  Allergic/Immunologic: negative for hay fever    Objective:     Physical Exam:  Patient Vitals for the past 24 hrs:   Temp Pulse Resp BP SpO2   06/20/17 0838 98 °F (36.7 °C) 64 18 149/65 92 %   06/20/17 0749 - - - - 93 %   06/20/17 0629 98.1 °F (36.7 °C) 86 20 142/76 92 %   06/20/17 0412 - - - - 91 %   06/20/17 0151 98 °F (36.7 °C) 71 20 143/74 92 %   06/19/17 2314 - - - - 93 %   06/19/17 2154 98.1 °F (36.7 °C) 88 20 122/65 92 %   06/19/17 1948 - - - - 91 %   06/19/17 1851 97.7 °F (36.5 °C) 71 20 133/84 94 %   06/19/17 1620 - - - - 92 %   06/19/17 1501 97.5 °F (36.4 °C) 62 18 136/49 91 %   06/19/17 1308 - - - - 94 %   06/19/17 1020 - - - - 95 %   06/19/17 0949 97.5 °F (36.4 °C) 86 18 149/78 95 %   06/19/17 0930 - - - - 96 %     AF, VSS  O2 sats stable  HEENT: wnl  NECK:  No venous distention or adenopathy   HEART: RRR, no rubs or gallops  LUNGS: Ovidio. Inspiratory and expiratory wheezes  ABDOMEN: soft with active BS.  No tenderness, no distention, no organomegaly  EXT: warm,no edema  SKIN: Normal turgor, no breaks  NEURO: Awake, alert, non focal    Intake and Output:  Current Shift:     Last three shifts:  06/18 1901 - 06/20 0700  In: 880 [P.O.:480; I.V.:400]  Out: 2725 [Urine:2725]    No results found for this or any previous visit (from the past 24 hour(s)).         Current Facility-Administered Medications   Medication Dose Route Frequency    methylPREDNISolone (PF) (SOLU-MEDROL) injection 20 mg  20 mg IntraVENous Q8H    levoFLOXacin (LEVAQUIN) tablet 500 mg  500 mg Oral Q48H    lactulose (CHRONULAC) solution 20 g  30 mL Oral BID    mirtazapine (REMERON) tablet 15 mg  15 mg Oral QHS    melatonin tablet 3 mg  3 mg Oral QHS PRN    ondansetron (ZOFRAN) injection 4 mg  4 mg IntraVENous Q6H PRN    magnesium hydroxide (MILK OF MAGNESIA) 400 mg/5 mL oral suspension 30 mL  30 mL Oral DAILY PRN    docusate sodium (COLACE) capsule 100 mg  100 mg Oral DAILY    heparin (porcine) injection 5,000 Units  5,000 Units SubCUTAneous Q12H    pantoprazole (PROTONIX) tablet 40 mg  40 mg Oral ACB    albuterol-ipratropium (DUO-NEB) 2.5 MG-0.5 MG/3 ML  3 mL Nebulization Q4H RT    budesonide (PULMICORT) 500 mcg/2 ml nebulizer suspension  500 mcg Nebulization BID RT    arformoterol (BROVANA) neb solution 15 mcg  15 mcg Nebulization BID RT    piperacillin-tazobactam (ZOSYN) 3.375 g in 0.9% sodium chloride (MBP/ADV) 100 mL MBP##EXTENDED INFUSION##  3.375 g IntraVENous Q8H    tamsulosin (FLOMAX) capsule 0.4 mg  0.4 mg Oral QHS    finasteride (PROSCAR) tablet 5 mg  5 mg Oral DAILY    aspirin chewable tablet 81 mg  81 mg Oral DAILY    furosemide (LASIX) tablet 40 mg  40 mg Oral ACB&D    clopidogrel (PLAVIX) tablet 75 mg  75 mg Oral DAILY    levothyroxine (SYNTHROID) tablet 50 mcg  50 mcg Oral ACB    dilTIAZem CD (CARDIZEM CD) capsule 180 mg  180 mg Oral DAILY       Lab Results   Component Value Date/Time    Glucose 156 06/17/2017 04:35 AM    Glucose 127 06/15/2017 05:00 AM    Glucose 137 06/14/2017 05:15 AM    Glucose 142 06/13/2017 12:20 PM    Glucose 111 06/05/2016 11:36 PM        Assessment     Active Problems:    COPD exacerbation ([facility] (2/1/2016)      Hyponatremia (6/13/2017)      Pneumonia (6/13/2017)      Acute on chronic respiratory failure with hypoxemia (HCC) (6/14/2017)      Malnutrition of moderate degree (Ilona Massed: 60% to less than 75% of standard weight) ([facility] (6/14/2017)      CHELY (acute kidney injury) ([facility] (6/14/2017)      Hypokalemia (6/14/2017)        Plan     Up in chair  Decrease O2 further  Decrease steroids  Continue  Lactulose  Zofran prn  Remeron for sleep  O2 sats stabilizing ,O2 requirement less  Continue same Tx. D-Dimer up some, Duplex LE negative  Sodium improved, increased fluid allowance  Hyponatremia appears dilutional  Replace K  TSH OK  Continue other tx.   DNR        Kayli Broderick MD  6/20/2017,

## 2017-06-21 LAB
ANION GAP BLD CALC-SCNC: 6 MMOL/L (ref 3–18)
BASOPHILS # BLD AUTO: 0 K/UL (ref 0–0.06)
BASOPHILS # BLD: 0 % (ref 0–2)
BUN SERPL-MCNC: 69 MG/DL (ref 7–18)
BUN/CREAT SERPL: 38 (ref 12–20)
CALCIUM SERPL-MCNC: 8.2 MG/DL (ref 8.5–10.1)
CHLORIDE SERPL-SCNC: 94 MMOL/L (ref 100–108)
CO2 SERPL-SCNC: 42 MMOL/L (ref 21–32)
CREAT SERPL-MCNC: 1.83 MG/DL (ref 0.6–1.3)
DIFFERENTIAL METHOD BLD: ABNORMAL
EOSINOPHIL # BLD: 0 K/UL (ref 0–0.4)
EOSINOPHIL NFR BLD: 0 % (ref 0–5)
ERYTHROCYTE [DISTWIDTH] IN BLOOD BY AUTOMATED COUNT: 14.7 % (ref 11.6–14.5)
GLUCOSE SERPL-MCNC: 161 MG/DL (ref 74–99)
HCT VFR BLD AUTO: 43.6 % (ref 36–48)
HGB BLD-MCNC: 14.2 G/DL (ref 13–16)
LYMPHOCYTES # BLD AUTO: 8 % (ref 21–52)
LYMPHOCYTES # BLD: 0.9 K/UL (ref 0.9–3.6)
MCH RBC QN AUTO: 29.3 PG (ref 24–34)
MCHC RBC AUTO-ENTMCNC: 32.6 G/DL (ref 31–37)
MCV RBC AUTO: 90.1 FL (ref 74–97)
MONOCYTES # BLD: 0.4 K/UL (ref 0.05–1.2)
MONOCYTES NFR BLD AUTO: 3 % (ref 3–10)
NEUTS SEG # BLD: 10.3 K/UL (ref 1.8–8)
NEUTS SEG NFR BLD AUTO: 89 % (ref 40–73)
PLATELET # BLD AUTO: 190 K/UL (ref 135–420)
PMV BLD AUTO: 10.6 FL (ref 9.2–11.8)
POTASSIUM SERPL-SCNC: 3.7 MMOL/L (ref 3.5–5.5)
RBC # BLD AUTO: 4.84 M/UL (ref 4.7–5.5)
SODIUM SERPL-SCNC: 142 MMOL/L (ref 136–145)
WBC # BLD AUTO: 11.6 K/UL (ref 4.6–13.2)

## 2017-06-21 PROCEDURE — 94760 N-INVAS EAR/PLS OXIMETRY 1: CPT

## 2017-06-21 PROCEDURE — 80048 BASIC METABOLIC PNL TOTAL CA: CPT | Performed by: INTERNAL MEDICINE

## 2017-06-21 PROCEDURE — 65660000000 HC RM CCU STEPDOWN

## 2017-06-21 PROCEDURE — 74011250637 HC RX REV CODE- 250/637: Performed by: INTERNAL MEDICINE

## 2017-06-21 PROCEDURE — 94640 AIRWAY INHALATION TREATMENT: CPT

## 2017-06-21 PROCEDURE — 36415 COLL VENOUS BLD VENIPUNCTURE: CPT | Performed by: INTERNAL MEDICINE

## 2017-06-21 PROCEDURE — 85025 COMPLETE CBC W/AUTO DIFF WBC: CPT | Performed by: INTERNAL MEDICINE

## 2017-06-21 PROCEDURE — 74011250636 HC RX REV CODE- 250/636: Performed by: INTERNAL MEDICINE

## 2017-06-21 PROCEDURE — 74011000250 HC RX REV CODE- 250: Performed by: INTERNAL MEDICINE

## 2017-06-21 PROCEDURE — 74011000258 HC RX REV CODE- 258: Performed by: INTERNAL MEDICINE

## 2017-06-21 PROCEDURE — 74011250637 HC RX REV CODE- 250/637: Performed by: HOSPITALIST

## 2017-06-21 RX ORDER — LEVOFLOXACIN 750 MG/1
750 TABLET ORAL
Status: DISCONTINUED | OUTPATIENT
Start: 2017-06-21 | End: 2017-06-26

## 2017-06-21 RX ADMIN — FINASTERIDE 5 MG: 5 TABLET, FILM COATED ORAL at 12:13

## 2017-06-21 RX ADMIN — FUROSEMIDE 40 MG: 40 TABLET ORAL at 11:27

## 2017-06-21 RX ADMIN — Medication 3 MG: at 21:20

## 2017-06-21 RX ADMIN — IPRATROPIUM BROMIDE AND ALBUTEROL SULFATE 3 ML: .5; 3 SOLUTION RESPIRATORY (INHALATION) at 16:22

## 2017-06-21 RX ADMIN — DOCUSATE SODIUM 100 MG: 100 CAPSULE, LIQUID FILLED ORAL at 11:26

## 2017-06-21 RX ADMIN — LACTULOSE 20 G: 20 SOLUTION ORAL at 17:44

## 2017-06-21 RX ADMIN — FUROSEMIDE 40 MG: 40 TABLET ORAL at 17:40

## 2017-06-21 RX ADMIN — IPRATROPIUM BROMIDE AND ALBUTEROL SULFATE 3 ML: .5; 3 SOLUTION RESPIRATORY (INHALATION) at 02:30

## 2017-06-21 RX ADMIN — IPRATROPIUM BROMIDE AND ALBUTEROL SULFATE 3 ML: .5; 3 SOLUTION RESPIRATORY (INHALATION) at 07:31

## 2017-06-21 RX ADMIN — IPRATROPIUM BROMIDE AND ALBUTEROL SULFATE 3 ML: .5; 3 SOLUTION RESPIRATORY (INHALATION) at 00:44

## 2017-06-21 RX ADMIN — PIPERACILLIN SODIUM,TAZOBACTAM SODIUM 3.38 G: 3; .375 INJECTION, POWDER, FOR SOLUTION INTRAVENOUS at 17:45

## 2017-06-21 RX ADMIN — METHYLPREDNISOLONE SODIUM SUCCINATE 20 MG: 40 INJECTION, POWDER, FOR SOLUTION INTRAMUSCULAR; INTRAVENOUS at 17:41

## 2017-06-21 RX ADMIN — DILTIAZEM HYDROCHLORIDE 180 MG: 180 CAPSULE, EXTENDED RELEASE ORAL at 11:26

## 2017-06-21 RX ADMIN — MIRTAZAPINE 15 MG: 15 TABLET, FILM COATED ORAL at 21:20

## 2017-06-21 RX ADMIN — ARFORMOTEROL TARTRATE 15 MCG: 15 SOLUTION RESPIRATORY (INHALATION) at 07:31

## 2017-06-21 RX ADMIN — METHYLPREDNISOLONE SODIUM SUCCINATE 20 MG: 40 INJECTION, POWDER, FOR SOLUTION INTRAMUSCULAR; INTRAVENOUS at 05:15

## 2017-06-21 RX ADMIN — ASPIRIN 81 MG 81 MG: 81 TABLET ORAL at 11:26

## 2017-06-21 RX ADMIN — CLOPIDOGREL BISULFATE 75 MG: 75 TABLET ORAL at 11:26

## 2017-06-21 RX ADMIN — BUDESONIDE 500 MCG: 0.5 INHALANT RESPIRATORY (INHALATION) at 20:20

## 2017-06-21 RX ADMIN — IPRATROPIUM BROMIDE AND ALBUTEROL SULFATE 3 ML: .5; 3 SOLUTION RESPIRATORY (INHALATION) at 13:43

## 2017-06-21 RX ADMIN — LEVOTHYROXINE SODIUM 50 MCG: 50 TABLET ORAL at 11:26

## 2017-06-21 RX ADMIN — IPRATROPIUM BROMIDE AND ALBUTEROL SULFATE 3 ML: .5; 3 SOLUTION RESPIRATORY (INHALATION) at 20:20

## 2017-06-21 RX ADMIN — PIPERACILLIN SODIUM,TAZOBACTAM SODIUM 3.38 G: 3; .375 INJECTION, POWDER, FOR SOLUTION INTRAVENOUS at 11:26

## 2017-06-21 RX ADMIN — LEVOFLOXACIN 750 MG: 750 TABLET, FILM COATED ORAL at 17:40

## 2017-06-21 RX ADMIN — TAMSULOSIN HYDROCHLORIDE 0.4 MG: 0.4 CAPSULE ORAL at 21:20

## 2017-06-21 RX ADMIN — LACTULOSE 20 G: 20 SOLUTION ORAL at 11:26

## 2017-06-21 RX ADMIN — HEPARIN SODIUM 5000 UNITS: 5000 INJECTION, SOLUTION INTRAVENOUS; SUBCUTANEOUS at 05:15

## 2017-06-21 RX ADMIN — PANTOPRAZOLE SODIUM 40 MG: 40 TABLET, DELAYED RELEASE ORAL at 11:26

## 2017-06-21 RX ADMIN — ARFORMOTEROL TARTRATE 15 MCG: 15 SOLUTION RESPIRATORY (INHALATION) at 20:29

## 2017-06-21 RX ADMIN — PIPERACILLIN SODIUM,TAZOBACTAM SODIUM 3.38 G: 3; .375 INJECTION, POWDER, FOR SOLUTION INTRAVENOUS at 01:37

## 2017-06-21 RX ADMIN — HEPARIN SODIUM 5000 UNITS: 5000 INJECTION, SOLUTION INTRAVENOUS; SUBCUTANEOUS at 17:42

## 2017-06-21 RX ADMIN — BUDESONIDE 500 MCG: 0.5 INHALANT RESPIRATORY (INHALATION) at 07:31

## 2017-06-21 NOTE — PROGRESS NOTES
Chart reviewed. Plan remains home with his dtr/family support when medically stable. Will cont to follow for any needs. Melissa Cope RN,ext. [phone].

## 2017-06-21 NOTE — PROGRESS NOTES
Report received from [facility]. Patient received inn bed sleeping with normal respirations noted, he is noted on a HI-Flow nasal cannula running at 50 L/min. Lab called with a critical Co2 of 43, Md Robles notified. MD Surya Santillan asked if Respiratory could apply a facial tent with the Hi Flow machine. Called Respiratory dept. And they state this is not possible and they would follow up with MD Surya Santillan. Patient was presented to ER with SOB, related to respiratory failure. Patient is noted with a 22 g to the left arm and has a hx of pulling IV's out. IV is secured with Coban Tape. It is reported that patient is on telemetry box 5 with NSR. Patient is incontinent of bowel and bladder, noted with condom cath, he is hard of hearing, noted on mechanical soft diet with 1500 ml fluid restrictions. Reported to have multiple bruising to bilateral UE. Patient has showed signs of SOB and respiratory distress today, he is noted to be mouth breathing every time. He does well with education and comforting to re-educated him to breath threw his nose. He regains his breathing and o2 sats return to normal readings. Family has been in the room off and on today. All medication taken whole with zero difficulty. Bedside and Verbal shift change report given to [facility] (oncoming nurse) by Mani Rodarte RN (offgoing nurse). Report included the following information SBAR, Intake/Output, MAR, Recent Results, Med Rec Status and Cardiac Rhythm NSR.

## 2017-06-21 NOTE — PROGRESS NOTES
Pharmacy adjusting dose for Levofloxacin (Levaquin) per renal protocol. Was on a regimen of: 500 mg PO q48h. Labs:   Serum Creatinine - 1.83 mg/dl  CrCl - 26.1 ml/min    Plan:  Changed Levofloxacin to 750 mg PO q48h. Pharmacy to follow and will redose based on renal function changes. Thanks.

## 2017-06-21 NOTE — PROGRESS NOTES
General Internal Medicine/Geriatrics    Patient: Torrie Fields MRN: [id number]  CSN: [id number]    YOB: 1929  Age: 80 y.o. Sex: male    DOA: 6/13/2017 LOS:  LOS: 8 days                    Subjective:     Feels better  Less dyspneic  Comfortable  No CP  No further Vomiting  Pleasant, cooperative   coughing less     Review of Systems:  Eyes: negative  Ears, Nose, Mouth, Throat, and Face: negative  Respiratory: + cough,less  wheezing and dyspnea  Cardiovascular: negative for chest pain  Gastrointestinal: negative for nausea, vomiting and diarrhea  Genitourinary:negative for dysuria and hematuria  Integument/Breast: negative  Hematologic/Lymphatic: negative for bleeding  Musculoskeletal:negative for arthralgias  Neurological: negative for weakness  Behavioral/Psychiatric: negative for behavior problems  Endocrine: negative for temperature intolerance  Allergic/Immunologic: negative for hay fever    Objective:     Physical Exam:  Patient Vitals for the past 24 hrs:   Temp Pulse Resp BP SpO2   06/21/17 0733 - - - - 98 %   06/21/17 0612 97.5 °F (36.4 °C) 75 20 166/90 97 %   06/21/17 0057 97.5 °F (36.4 °C) 68 20 151/65 94 %   06/21/17 0045 - - - - 96 %   06/20/17 2251 98.4 °F (36.9 °C) (!) 125 20 134/60 98 %   06/20/17 2009 - - - - 93 %   06/20/17 1955 - - - - 90 %   06/20/17 1855 98 °F (36.7 °C) 65 26 145/84 (!) 89 %   06/20/17 1613 98.1 °F (36.7 °C) 63 28 165/84 (!) 89 %   06/20/17 1552 - - - - 92 %   06/20/17 1358 97.3 °F (36.3 °C) 88 18 129/80 93 %   06/20/17 1146 - - - - 92 %   06/20/17 1037 - - - - 91 %     AF, VSS  O2 sats stable  HEENT: wnl  NECK:  No venous distention or adenopathy   HEART: RRR, no rubs or gallops  LUNGS: improved air entry Decreased wheezes  ABDOMEN: soft with active BS.  No tenderness, no distention, no organomegaly  EXT: warm,no edema  SKIN: Normal turgor, no breaks  NEURO: Awake, alert, non focal    Intake and Output:  Current Shift:     Last three shifts:  06/19 1901 - 06/21 0700  In: 1060 [P.O.:460; I.V.:600]  Out: 825 [Urine:825]    Recent Results (from the past 24 hour(s))   METABOLIC PANEL, BASIC    Collection Time: 06/21/17  6:34 AM   Result Value Ref Range    Sodium 142 136 - 145 mmol/L    Potassium 3.7 3.5 - 5.5 mmol/L    Chloride 94 (L) 100 - 108 mmol/L    CO2 42 (HH) 21 - 32 mmol/L    Anion gap 6 3.0 - 18 mmol/L    Glucose 161 (H) 74 - 99 mg/dL    BUN 69 (H) 7.0 - 18 MG/DL    Creatinine 1.83 (H) 0.6 - 1.3 MG/DL    BUN/Creatinine ratio 38 (H) 12 - 20      GFR est AA 43 (L) >60 ml/min/1.73m2    GFR est non-AA 35 (L) >60 ml/min/1.73m2    Calcium 8.2 (L) 8.5 - 10.1 MG/DL   CBC WITH AUTOMATED DIFF    Collection Time: 06/21/17  6:34 AM   Result Value Ref Range    WBC 11.6 4.6 - 13.2 K/uL    RBC 4.84 4.70 - 5.50 M/uL    HGB 14.2 13.0 - 16.0 g/dL    HCT 43.6 36.0 - 48.0 %    MCV 90.1 74.0 - 97.0 FL    MCH 29.3 24.0 - 34.0 PG    MCHC 32.6 31.0 - 37.0 g/dL    RDW 14.7 (H) 11.6 - 14.5 %    PLATELET 513 680 - 970 K/uL    MPV 10.6 9.2 - 11.8 FL    NEUTROPHILS 89 (H) 40 - 73 %    LYMPHOCYTES 8 (L) 21 - 52 %    MONOCYTES 3 3 - 10 %    EOSINOPHILS 0 0 - 5 %    BASOPHILS 0 0 - 2 %    ABS. NEUTROPHILS 10.3 (H) 1.8 - 8.0 K/UL    ABS. LYMPHOCYTES 0.9 0.9 - 3.6 K/UL    ABS. MONOCYTES 0.4 0.05 - 1.2 K/UL    ABS. EOSINOPHILS 0.0 0.0 - 0.4 K/UL    ABS.  BASOPHILS 0.0 0.0 - 0.06 K/UL    DF AUTOMATED             Current Facility-Administered Medications   Medication Dose Route Frequency    methylPREDNISolone (PF) (SOLU-MEDROL) injection 20 mg  20 mg IntraVENous Q12H    levoFLOXacin (LEVAQUIN) tablet 500 mg  500 mg Oral Q48H    lactulose (CHRONULAC) solution 20 g  30 mL Oral BID    mirtazapine (REMERON) tablet 15 mg  15 mg Oral QHS    melatonin tablet 3 mg  3 mg Oral QHS PRN    ondansetron (ZOFRAN) injection 4 mg  4 mg IntraVENous Q6H PRN    magnesium hydroxide (MILK OF MAGNESIA) 400 mg/5 mL oral suspension 30 mL  30 mL Oral DAILY PRN    docusate sodium (COLACE) capsule 100 mg  100 mg Oral DAILY    heparin (porcine) injection 5,000 Units  5,000 Units SubCUTAneous Q12H    pantoprazole (PROTONIX) tablet 40 mg  40 mg Oral ACB    albuterol-ipratropium (DUO-NEB) 2.5 MG-0.5 MG/3 ML  3 mL Nebulization Q4H RT    budesonide (PULMICORT) 500 mcg/2 ml nebulizer suspension  500 mcg Nebulization BID RT    arformoterol (BROVANA) neb solution 15 mcg  15 mcg Nebulization BID RT    piperacillin-tazobactam (ZOSYN) 3.375 g in 0.9% sodium chloride (MBP/ADV) 100 mL MBP##EXTENDED INFUSION##  3.375 g IntraVENous Q8H    tamsulosin (FLOMAX) capsule 0.4 mg  0.4 mg Oral QHS    finasteride (PROSCAR) tablet 5 mg  5 mg Oral DAILY    aspirin chewable tablet 81 mg  81 mg Oral DAILY    furosemide (LASIX) tablet 40 mg  40 mg Oral ACB&D    clopidogrel (PLAVIX) tablet 75 mg  75 mg Oral DAILY    levothyroxine (SYNTHROID) tablet 50 mcg  50 mcg Oral ACB    dilTIAZem CD (CARDIZEM CD) capsule 180 mg  180 mg Oral DAILY       Lab Results   Component Value Date/Time    Glucose 161 06/21/2017 06:34 AM    Glucose 156 06/17/2017 04:35 AM    Glucose 127 06/15/2017 05:00 AM    Glucose 137 06/14/2017 05:15 AM    Glucose 142 06/13/2017 12:20 PM        Assessment     Active Problems:    COPD exacerbation (HCC) (2/1/2016)      Hyponatremia (6/13/2017)      Pneumonia (6/13/2017)      Acute on chronic respiratory failure with hypoxemia (HCC) (6/14/2017)      Malnutrition of moderate degree (Diaz: 60% to less than 75% of standard weight) ([address] (6/14/2017)      CHELY (acute kidney injury) ([facility] (6/14/2017)      Hypokalemia (6/14/2017)        Plan     Up in chair  Decrease O2 further, ? Face tent, mouth breather  Decrease steroids further  Continue  Lactulose  Zofran prn  Remeron for sleep  O2 sats stabilizing ,O2 requirement less  Continue same Tx. D-Dimer up some, Duplex LE negative  Sodium improved, increased fluid allowance  Hyponatremia appears dilutional  Replace K  TSH OK  Continue other tx.   DNR        David Ibrahim MD  6/21/2017,

## 2017-06-22 PROCEDURE — 74011250637 HC RX REV CODE- 250/637: Performed by: HOSPITALIST

## 2017-06-22 PROCEDURE — 74011250636 HC RX REV CODE- 250/636: Performed by: INTERNAL MEDICINE

## 2017-06-22 PROCEDURE — 65660000000 HC RM CCU STEPDOWN

## 2017-06-22 PROCEDURE — 94640 AIRWAY INHALATION TREATMENT: CPT

## 2017-06-22 PROCEDURE — 94760 N-INVAS EAR/PLS OXIMETRY 1: CPT

## 2017-06-22 PROCEDURE — 74011250637 HC RX REV CODE- 250/637: Performed by: INTERNAL MEDICINE

## 2017-06-22 PROCEDURE — 74011000258 HC RX REV CODE- 258: Performed by: INTERNAL MEDICINE

## 2017-06-22 PROCEDURE — 74011000250 HC RX REV CODE- 250: Performed by: INTERNAL MEDICINE

## 2017-06-22 RX ADMIN — MAGNESIUM HYDROXIDE 30 ML: 400 SUSPENSION ORAL at 03:00

## 2017-06-22 RX ADMIN — DILTIAZEM HYDROCHLORIDE 180 MG: 180 CAPSULE, EXTENDED RELEASE ORAL at 10:39

## 2017-06-22 RX ADMIN — IPRATROPIUM BROMIDE AND ALBUTEROL SULFATE 3 ML: .5; 3 SOLUTION RESPIRATORY (INHALATION) at 04:23

## 2017-06-22 RX ADMIN — FINASTERIDE 5 MG: 5 TABLET, FILM COATED ORAL at 10:38

## 2017-06-22 RX ADMIN — PIPERACILLIN SODIUM,TAZOBACTAM SODIUM 3.38 G: 3; .375 INJECTION, POWDER, FOR SOLUTION INTRAVENOUS at 17:44

## 2017-06-22 RX ADMIN — METHYLPREDNISOLONE SODIUM SUCCINATE 20 MG: 40 INJECTION, POWDER, FOR SOLUTION INTRAMUSCULAR; INTRAVENOUS at 10:44

## 2017-06-22 RX ADMIN — IPRATROPIUM BROMIDE AND ALBUTEROL SULFATE 3 ML: .5; 3 SOLUTION RESPIRATORY (INHALATION) at 11:27

## 2017-06-22 RX ADMIN — HEPARIN SODIUM 5000 UNITS: 5000 INJECTION, SOLUTION INTRAVENOUS; SUBCUTANEOUS at 17:45

## 2017-06-22 RX ADMIN — Medication 3 MG: at 21:05

## 2017-06-22 RX ADMIN — PANTOPRAZOLE SODIUM 40 MG: 40 TABLET, DELAYED RELEASE ORAL at 10:39

## 2017-06-22 RX ADMIN — IPRATROPIUM BROMIDE AND ALBUTEROL SULFATE 3 ML: .5; 3 SOLUTION RESPIRATORY (INHALATION) at 00:10

## 2017-06-22 RX ADMIN — CLOPIDOGREL BISULFATE 75 MG: 75 TABLET ORAL at 10:39

## 2017-06-22 RX ADMIN — TAMSULOSIN HYDROCHLORIDE 0.4 MG: 0.4 CAPSULE ORAL at 21:05

## 2017-06-22 RX ADMIN — ARFORMOTEROL TARTRATE 15 MCG: 15 SOLUTION RESPIRATORY (INHALATION) at 20:53

## 2017-06-22 RX ADMIN — FUROSEMIDE 40 MG: 40 TABLET ORAL at 10:40

## 2017-06-22 RX ADMIN — IPRATROPIUM BROMIDE AND ALBUTEROL SULFATE 3 ML: .5; 3 SOLUTION RESPIRATORY (INHALATION) at 20:45

## 2017-06-22 RX ADMIN — LACTULOSE 20 G: 20 SOLUTION ORAL at 10:40

## 2017-06-22 RX ADMIN — DOCUSATE SODIUM 100 MG: 100 CAPSULE, LIQUID FILLED ORAL at 10:40

## 2017-06-22 RX ADMIN — IPRATROPIUM BROMIDE AND ALBUTEROL SULFATE 3 ML: .5; 3 SOLUTION RESPIRATORY (INHALATION) at 16:51

## 2017-06-22 RX ADMIN — ARFORMOTEROL TARTRATE 15 MCG: 15 SOLUTION RESPIRATORY (INHALATION) at 07:36

## 2017-06-22 RX ADMIN — BUDESONIDE 500 MCG: 0.5 INHALANT RESPIRATORY (INHALATION) at 07:36

## 2017-06-22 RX ADMIN — LEVOTHYROXINE SODIUM 50 MCG: 50 TABLET ORAL at 10:40

## 2017-06-22 RX ADMIN — MIRTAZAPINE 15 MG: 15 TABLET, FILM COATED ORAL at 21:05

## 2017-06-22 RX ADMIN — MAGNESIUM HYDROXIDE 30 ML: 400 SUSPENSION ORAL at 21:05

## 2017-06-22 RX ADMIN — BUDESONIDE 500 MCG: 0.5 INHALANT RESPIRATORY (INHALATION) at 20:53

## 2017-06-22 RX ADMIN — HEPARIN SODIUM 5000 UNITS: 5000 INJECTION, SOLUTION INTRAVENOUS; SUBCUTANEOUS at 06:38

## 2017-06-22 RX ADMIN — PIPERACILLIN SODIUM,TAZOBACTAM SODIUM 3.38 G: 3; .375 INJECTION, POWDER, FOR SOLUTION INTRAVENOUS at 02:05

## 2017-06-22 RX ADMIN — ASPIRIN 81 MG 81 MG: 81 TABLET ORAL at 10:39

## 2017-06-22 RX ADMIN — METHYLPREDNISOLONE SODIUM SUCCINATE 20 MG: 40 INJECTION, POWDER, FOR SOLUTION INTRAMUSCULAR; INTRAVENOUS at 21:05

## 2017-06-22 RX ADMIN — FUROSEMIDE 40 MG: 40 TABLET ORAL at 16:20

## 2017-06-22 RX ADMIN — PIPERACILLIN SODIUM,TAZOBACTAM SODIUM 3.38 G: 3; .375 INJECTION, POWDER, FOR SOLUTION INTRAVENOUS at 10:36

## 2017-06-22 RX ADMIN — IPRATROPIUM BROMIDE AND ALBUTEROL SULFATE 3 ML: .5; 3 SOLUTION RESPIRATORY (INHALATION) at 07:36

## 2017-06-22 RX ADMIN — LACTULOSE 20 G: 20 SOLUTION ORAL at 17:45

## 2017-06-22 NOTE — PROGRESS NOTES
0663: Pt asleep, easily awakened. Answering questions appropriately. Diminished expiratory wheezing heard in upper lung regions. Crackles heard in LLL, Diminished crackles heard in RLL. Pt given scheduled DuoNeb, Brovanna, and Pulmicort treatment via in line nebulizer. No adverse effects observed. Reduced HFNC to 50 LPM and FIO2 to 45% in effort to titrate as patient tolerates. Plan is to continue titrating in effort to wean off HFNC    0852: Per verbal order from Dr Jj Barrett, (Order was to titrate O2 to baseline setting of 4 LPM), reduced HFNC to 30 LPM and kept FIO2 at 45%. Will keep titrating flow rate and FIO2 with goal of having pt on 4 LPM NC by end of day. 1048: Attempted to place pt on 6 LPM Nasal cannula. Accessory muscle use, SPo2 89-90% on 6 LPM NC. Pt placed back on heated HFNC. Will attempt to reduce FIO2 as pt tolerates. 1129: Scheduled DuoNeb treatment given. SPO2 was 98%. Decreased FIO2 to 45%. 1653: Pt awake, on heated HFNC, SPO2 on FIO2 45% and 30 LPM was 93%. Reduced flow rate to 25 LPM and FIO2 to 40%. Scheduled DuoNeb treatment given via inline nebulizer. 1703: Following breathing treatment, SPo2 was 98%. Reduced FIO2 from 40% to 35. Will check SPO2 in 30 min. 1712: SPO2 on 35% was 87%; Increased FIO2 back to 40%.

## 2017-06-22 NOTE — PROGRESS NOTES
Report received from [address] [place] [address]. Patient was received in bed awake and alert in the fetal position, noted on Hi-flow oxygen at 55 L. Patient shows zero s/s of distress at this time and c/o zero pain. He is noted with 22g PIV to the left arm saline locked. Noted on telemetry box 5 reported at NSR, he is non ambulatory, and incontinent, noted with severe abdominal distention, and bruises bilateral UE, and LE. Patient has been noted throughout this shift to remove his hi flow meter several times, his oxygen level drop drastically to the high 70's, and it take several minutes to regain his o2 sats. Patient is alert and is able to make all of his needs known at this time, he has been educated to not remove his oxygen and states he understands. Patient is currently in bed with zero c/o pain and zero s/s of distress. Bedside and Verbal shift change report given to [address] [place] [address] Toole (oncoming nurse) by Steffanie Wade RN  (offgoing nurse). Report included the following information SBAR, Procedure Summary, Intake/Output, MAR, Med Rec Status and Cardiac Rhythm NSR.

## 2017-06-22 NOTE — PROGRESS NOTES
1410-Called to patient's room for assessment after pt. Was found by RN with high flow cannula out of nose. RN stated pt. O2 sats were noted in the 70's. Pt. Returned to high flow by RN. However, O2 Sats remained at 84-86%. FIO2 increased to 45% as pt. Is eating.  O2 Sats currently noted at 91-92% HR-58, RR-23. -[facility]

## 2017-06-22 NOTE — PROGRESS NOTES
General Internal Medicine/Geriatrics    Patient: Fran Jimenez MRN: [id number]  CSN: [id number]    YOB: 1929  Age: 80 y.o. Sex: male    DOA: 6/13/2017 LOS:  LOS: 9 days                    Subjective:     No new c/o  Comfortable  No CP  No further Vomiting  Pleasant, cooperative   coughing less     Review of Systems:  Eyes: negative  Ears, Nose, Mouth, Throat, and Face: negative  Respiratory: + cough,less  wheezing and dyspnea  Cardiovascular: negative for chest pain  Gastrointestinal: negative for nausea, vomiting and diarrhea  Genitourinary:negative for dysuria and hematuria  Integument/Breast: negative  Hematologic/Lymphatic: negative for bleeding  Musculoskeletal:negative for arthralgias  Neurological: negative for weakness  Behavioral/Psychiatric: negative for behavior problems  Endocrine: negative for temperature intolerance  Allergic/Immunologic: negative for hay fever    Objective:     Physical Exam:  Patient Vitals for the past 24 hrs:   Temp Pulse Resp BP SpO2   06/22/17 0737 - - - - 98 %   06/22/17 0613 97.8 °F (36.6 °C) 66 18 175/75 99 %   06/22/17 0423 - - - - 96 %   06/22/17 0154 97.7 °F (36.5 °C) 68 18 177/78 100 %   06/22/17 0010 - - - - 98 %   06/21/17 2146 97.6 °F (36.4 °C) 67 22 174/69 97 %   06/21/17 2029 - - - - 99 %   06/21/17 2020 - - - - 96 %   06/21/17 1846 97.8 °F (36.6 °C) 90 19 165/78 100 %   06/21/17 1623 - - - - 99 %   06/21/17 1402 - - - 160/64 -   06/21/17 1400 97.7 °F (36.5 °C) 87 17 - 100 %   06/21/17 0950 97.9 °F (36.6 °C) 74 18 174/74 94 %     AF, VSS  O2 sats stable  HEENT: wnl  NECK:  No venous distention or adenopathy   HEART: RRR, no rubs or gallops  LUNGS: improved air entry Decreased wheezes  ABDOMEN: soft with active BS.  No tenderness, no distention, no organomegaly  EXT: warm,no edema  SKIN: Normal turgor, no breaks  NEURO: Awake, alert, non focal    Intake and Output:  Current Shift:     Last three shifts:  06/20 1901 - 06/22 0700  In: 1340 [P.O.:940; I.V.:400]  Out: -     No results found for this or any previous visit (from the past 24 hour(s)).         Current Facility-Administered Medications   Medication Dose Route Frequency    methylPREDNISolone (PF) (SOLU-MEDROL) injection 20 mg  20 mg IntraVENous Q12H    levoFLOXacin (LEVAQUIN) tablet 750 mg  750 mg Oral Q48H    lactulose (CHRONULAC) solution 20 g  30 mL Oral BID    mirtazapine (REMERON) tablet 15 mg  15 mg Oral QHS    melatonin tablet 3 mg  3 mg Oral QHS PRN    ondansetron (ZOFRAN) injection 4 mg  4 mg IntraVENous Q6H PRN    magnesium hydroxide (MILK OF MAGNESIA) 400 mg/5 mL oral suspension 30 mL  30 mL Oral DAILY PRN    docusate sodium (COLACE) capsule 100 mg  100 mg Oral DAILY    heparin (porcine) injection 5,000 Units  5,000 Units SubCUTAneous Q12H    pantoprazole (PROTONIX) tablet 40 mg  40 mg Oral ACB    albuterol-ipratropium (DUO-NEB) 2.5 MG-0.5 MG/3 ML  3 mL Nebulization Q4H RT    budesonide (PULMICORT) 500 mcg/2 ml nebulizer suspension  500 mcg Nebulization BID RT    arformoterol (BROVANA) neb solution 15 mcg  15 mcg Nebulization BID RT    piperacillin-tazobactam (ZOSYN) 3.375 g in 0.9% sodium chloride (MBP/ADV) 100 mL MBP##EXTENDED INFUSION##  3.375 g IntraVENous Q8H    tamsulosin (FLOMAX) capsule 0.4 mg  0.4 mg Oral QHS    finasteride (PROSCAR) tablet 5 mg  5 mg Oral DAILY    aspirin chewable tablet 81 mg  81 mg Oral DAILY    furosemide (LASIX) tablet 40 mg  40 mg Oral ACB&D    clopidogrel (PLAVIX) tablet 75 mg  75 mg Oral DAILY    levothyroxine (SYNTHROID) tablet 50 mcg  50 mcg Oral ACB    dilTIAZem CD (CARDIZEM CD) capsule 180 mg  180 mg Oral DAILY       Lab Results   Component Value Date/Time    Glucose 161 06/21/2017 06:34 AM    Glucose 156 06/17/2017 04:35 AM    Glucose 127 06/15/2017 05:00 AM    Glucose 137 06/14/2017 05:15 AM    Glucose 142 06/13/2017 12:20 PM        Assessment     Active Problems:    COPD exacerbation (HCC) (2/1/2016)      Hyponatremia (6/13/2017)      Pneumonia (6/13/2017)      Acute on chronic respiratory failure with hypoxemia (HCC) (6/14/2017)      Malnutrition of moderate degree (Larey Redo: 60% to less than 75% of standard weight) ([facility] (6/14/2017)      CHELY (acute kidney injury) ([facility] (6/14/2017)      Hypokalemia (6/14/2017)        Plan     Up in chair  Titrate O2 down to maintenance of 4 L. Keep current dose of steroids  Continue  Lactulose  Zofran prn  Remeron for sleep  O2 sats stabilizing ,O2 requirement less  Continue same Tx. D-Dimer up some, Duplex LE negative  Sodium improved, increased fluid allowance  Hyponatremia appears dilutional  Replace K  TSH OK  Continue other tx.   DNR        Deepthi North MD  6/22/2017,

## 2017-06-22 NOTE — PROGRESS NOTES
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 Pt is Alert and Oriented and knows in the hospital and verbalizes understanding that [place] Ca[place] is not here. When asked pt what she does he said nothing. 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 I think perhaps pt gets lonely or afraid in room by himself. Recently pt started yelling can't breath. O2 sats would be at 100% when pt would do this. Respiratory on floor a couple of times pt did this. Talked to pt about deep breathing and repositioned pt. Pt repositioned several times and pulled up in bed. Pt constantly slides down and likes to lay on his sides. Pt has not had a BM for 1 week.  Gave PRN Mylanta

## 2017-06-23 PROCEDURE — 74011000258 HC RX REV CODE- 258: Performed by: INTERNAL MEDICINE

## 2017-06-23 PROCEDURE — 74011250637 HC RX REV CODE- 250/637: Performed by: INTERNAL MEDICINE

## 2017-06-23 PROCEDURE — 74011636637 HC RX REV CODE- 636/637: Performed by: INTERNAL MEDICINE

## 2017-06-23 PROCEDURE — 94640 AIRWAY INHALATION TREATMENT: CPT

## 2017-06-23 PROCEDURE — 74011250636 HC RX REV CODE- 250/636: Performed by: INTERNAL MEDICINE

## 2017-06-23 PROCEDURE — 74011000250 HC RX REV CODE- 250: Performed by: INTERNAL MEDICINE

## 2017-06-23 PROCEDURE — 65660000000 HC RM CCU STEPDOWN

## 2017-06-23 PROCEDURE — 74011250637 HC RX REV CODE- 250/637: Performed by: HOSPITALIST

## 2017-06-23 PROCEDURE — 97530 THERAPEUTIC ACTIVITIES: CPT

## 2017-06-23 PROCEDURE — 97162 PT EVAL MOD COMPLEX 30 MIN: CPT

## 2017-06-23 RX ORDER — PREDNISONE 20 MG/1
20 TABLET ORAL 2 TIMES DAILY WITH MEALS
Status: DISCONTINUED | OUTPATIENT
Start: 2017-06-23 | End: 2017-06-26

## 2017-06-23 RX ADMIN — LACTULOSE 20 G: 20 SOLUTION ORAL at 18:34

## 2017-06-23 RX ADMIN — TAMSULOSIN HYDROCHLORIDE 0.4 MG: 0.4 CAPSULE ORAL at 22:48

## 2017-06-23 RX ADMIN — IPRATROPIUM BROMIDE AND ALBUTEROL SULFATE 3 ML: .5; 3 SOLUTION RESPIRATORY (INHALATION) at 00:33

## 2017-06-23 RX ADMIN — FUROSEMIDE 40 MG: 40 TABLET ORAL at 16:18

## 2017-06-23 RX ADMIN — IPRATROPIUM BROMIDE AND ALBUTEROL SULFATE 3 ML: .5; 3 SOLUTION RESPIRATORY (INHALATION) at 20:03

## 2017-06-23 RX ADMIN — HEPARIN SODIUM 5000 UNITS: 5000 INJECTION, SOLUTION INTRAVENOUS; SUBCUTANEOUS at 18:34

## 2017-06-23 RX ADMIN — PIPERACILLIN SODIUM,TAZOBACTAM SODIUM 3.38 G: 3; .375 INJECTION, POWDER, FOR SOLUTION INTRAVENOUS at 18:35

## 2017-06-23 RX ADMIN — ARFORMOTEROL TARTRATE 15 MCG: 15 SOLUTION RESPIRATORY (INHALATION) at 20:04

## 2017-06-23 RX ADMIN — DOCUSATE SODIUM 100 MG: 100 CAPSULE, LIQUID FILLED ORAL at 12:50

## 2017-06-23 RX ADMIN — HEPARIN SODIUM 5000 UNITS: 5000 INJECTION, SOLUTION INTRAVENOUS; SUBCUTANEOUS at 05:50

## 2017-06-23 RX ADMIN — CLOPIDOGREL BISULFATE 75 MG: 75 TABLET ORAL at 12:50

## 2017-06-23 RX ADMIN — DILTIAZEM HYDROCHLORIDE 180 MG: 180 CAPSULE, EXTENDED RELEASE ORAL at 12:50

## 2017-06-23 RX ADMIN — BUDESONIDE 500 MCG: 0.5 INHALANT RESPIRATORY (INHALATION) at 07:25

## 2017-06-23 RX ADMIN — IPRATROPIUM BROMIDE AND ALBUTEROL SULFATE 3 ML: .5; 3 SOLUTION RESPIRATORY (INHALATION) at 15:43

## 2017-06-23 RX ADMIN — LEVOFLOXACIN 750 MG: 750 TABLET, FILM COATED ORAL at 16:18

## 2017-06-23 RX ADMIN — FINASTERIDE 5 MG: 5 TABLET, FILM COATED ORAL at 13:03

## 2017-06-23 RX ADMIN — IPRATROPIUM BROMIDE AND ALBUTEROL SULFATE 3 ML: .5; 3 SOLUTION RESPIRATORY (INHALATION) at 03:32

## 2017-06-23 RX ADMIN — PREDNISONE 20 MG: 20 TABLET ORAL at 16:18

## 2017-06-23 RX ADMIN — LACTULOSE 20 G: 20 SOLUTION ORAL at 12:50

## 2017-06-23 RX ADMIN — PANTOPRAZOLE SODIUM 40 MG: 40 TABLET, DELAYED RELEASE ORAL at 12:50

## 2017-06-23 RX ADMIN — MIRTAZAPINE 15 MG: 15 TABLET, FILM COATED ORAL at 22:48

## 2017-06-23 RX ADMIN — Medication 3 MG: at 22:52

## 2017-06-23 RX ADMIN — PIPERACILLIN SODIUM,TAZOBACTAM SODIUM 3.38 G: 3; .375 INJECTION, POWDER, FOR SOLUTION INTRAVENOUS at 02:05

## 2017-06-23 RX ADMIN — IPRATROPIUM BROMIDE AND ALBUTEROL SULFATE 3 ML: .5; 3 SOLUTION RESPIRATORY (INHALATION) at 07:24

## 2017-06-23 RX ADMIN — PIPERACILLIN SODIUM,TAZOBACTAM SODIUM 3.38 G: 3; .375 INJECTION, POWDER, FOR SOLUTION INTRAVENOUS at 10:00

## 2017-06-23 RX ADMIN — ASPIRIN 81 MG 81 MG: 81 TABLET ORAL at 12:50

## 2017-06-23 RX ADMIN — FUROSEMIDE 40 MG: 40 TABLET ORAL at 12:50

## 2017-06-23 RX ADMIN — ARFORMOTEROL TARTRATE 15 MCG: 15 SOLUTION RESPIRATORY (INHALATION) at 07:24

## 2017-06-23 RX ADMIN — IPRATROPIUM BROMIDE AND ALBUTEROL SULFATE 3 ML: .5; 3 SOLUTION RESPIRATORY (INHALATION) at 11:42

## 2017-06-23 RX ADMIN — BUDESONIDE 500 MCG: 0.5 INHALANT RESPIRATORY (INHALATION) at 20:03

## 2017-06-23 RX ADMIN — LEVOTHYROXINE SODIUM 50 MCG: 50 TABLET ORAL at 12:50

## 2017-06-23 NOTE — PROGRESS NOTES
1955  Bedside and Verbal shift change report given to tito motta  (oncoming nurse) by Dilma Rosales rn  (offgoing nurse). Report included the following information SBAR, Kardex, Intake/Output, MAR, Recent Results and Cardiac Rhythm nsr     0335  Pt incontinent of urine, cleaned at this time, new bed pad placed. 0745 Bedside and Verbal shift change report given to veronica solis rn  (oncoming nurse) by Penny Vázquez rn  (offgoing nurse). Report included the following information SBAR, Kardex, Intake/Output, MAR, Recent Results and Cardiac Rhythm nsr .

## 2017-06-23 NOTE — PROGRESS NOTES
General Internal Medicine/Geriatrics    Patient: Tiago Garcia MRN: [id number]  CSN: [id number]    YOB: 1929  Age: 80 y.o. Sex: male    DOA: 6/13/2017 LOS:  LOS: 10 days                    Subjective:     No new c/o  Comfortable  No CP  No further Vomiting  Pleasant, cooperative   coughing less     Review of Systems:  Eyes: negative  Ears, Nose, Mouth, Throat, and Face: negative  Respiratory: + cough,less  wheezing and dyspnea  Cardiovascular: negative for chest pain  Gastrointestinal: negative for nausea, vomiting and diarrhea  Genitourinary:negative for dysuria and hematuria  Integument/Breast: negative  Hematologic/Lymphatic: negative for bleeding  Musculoskeletal:negative for arthralgias  Neurological: negative for weakness  Behavioral/Psychiatric: negative for behavior problems  Endocrine: negative for temperature intolerance  Allergic/Immunologic: negative for hay fever    Objective:     Physical Exam:  Patient Vitals for the past 24 hrs:   Temp Pulse Resp BP SpO2   06/23/17 0948 98.7 °F (37.1 °C) 67 20 135/71 100 %   06/23/17 0727 - - - - 90 %   06/23/17 0544 98.7 °F (37.1 °C) 78 18 141/70 91 %   06/23/17 0333 - - - - 94 %   06/23/17 0144 97.6 °F (36.4 °C) 85 22 127/56 90 %   06/22/17 2216 97.8 °F (36.6 °C) 68 20 132/64 91 %   06/22/17 2100 - - - - 96 %   06/22/17 2045 - - - - 94 %   06/22/17 1833 98 °F (36.7 °C) 96 20 147/84 90 %   06/22/17 1703 - - - - 93 %   06/22/17 1651 - - - - 93 %   06/22/17 1418 97.7 °F (36.5 °C) 88 20 139/80 94 %   06/22/17 1127 - - - - 98 %   06/22/17 1052 98.6 °F (37 °C) 86 20 160/71 96 %   06/22/17 1046 - - - - 92 %     AF, VSS  O2 sats stable on 25L. High flow  HEENT: wnl  NECK:  No venous distention or adenopathy   HEART: RRR, no rubs or gallops  LUNGS: improved air entry Decreased wheezes  ABDOMEN: soft with active BS.  No tenderness, no distention, no organomegaly  EXT: warm,no edema  SKIN: Normal turgor, no breaks  NEURO: Awake, alert, non focal    Intake and Output:  Current Shift:  06/23 0701 - 06/23 1900  In: 240 [P.O.:240]  Out: -   Last three shifts:  06/21 1901 - 06/23 0700  In: 1560 [P.O.:960; I.V.:600]  Out: -     No results found for this or any previous visit (from the past 24 hour(s)).         Current Facility-Administered Medications   Medication Dose Route Frequency    predniSONE (DELTASONE) tablet 20 mg  20 mg Oral BID WITH MEALS    levoFLOXacin (LEVAQUIN) tablet 750 mg  750 mg Oral Q48H    lactulose (CHRONULAC) solution 20 g  30 mL Oral BID    mirtazapine (REMERON) tablet 15 mg  15 mg Oral QHS    melatonin tablet 3 mg  3 mg Oral QHS PRN    ondansetron (ZOFRAN) injection 4 mg  4 mg IntraVENous Q6H PRN    magnesium hydroxide (MILK OF MAGNESIA) 400 mg/5 mL oral suspension 30 mL  30 mL Oral DAILY PRN    docusate sodium (COLACE) capsule 100 mg  100 mg Oral DAILY    heparin (porcine) injection 5,000 Units  5,000 Units SubCUTAneous Q12H    pantoprazole (PROTONIX) tablet 40 mg  40 mg Oral ACB    albuterol-ipratropium (DUO-NEB) 2.5 MG-0.5 MG/3 ML  3 mL Nebulization Q4H RT    budesonide (PULMICORT) 500 mcg/2 ml nebulizer suspension  500 mcg Nebulization BID RT    arformoterol (BROVANA) neb solution 15 mcg  15 mcg Nebulization BID RT    piperacillin-tazobactam (ZOSYN) 3.375 g in 0.9% sodium chloride (MBP/ADV) 100 mL MBP##EXTENDED INFUSION##  3.375 g IntraVENous Q8H    tamsulosin (FLOMAX) capsule 0.4 mg  0.4 mg Oral QHS    finasteride (PROSCAR) tablet 5 mg  5 mg Oral DAILY    aspirin chewable tablet 81 mg  81 mg Oral DAILY    furosemide (LASIX) tablet 40 mg  40 mg Oral ACB&D    clopidogrel (PLAVIX) tablet 75 mg  75 mg Oral DAILY    levothyroxine (SYNTHROID) tablet 50 mcg  50 mcg Oral ACB    dilTIAZem CD (CARDIZEM CD) capsule 180 mg  180 mg Oral DAILY       Lab Results   Component Value Date/Time    Glucose 161 06/21/2017 06:34 AM    Glucose 156 06/17/2017 04:35 AM    Glucose 127 06/15/2017 05:00 AM    Glucose 137 06/14/2017 05:15 AM    Glucose 142 06/13/2017 12:20 PM        Assessment     Active Problems:    COPD exacerbation ([facility] [address] (2/1/2016)      Hyponatremia (6/13/2017)      Pneumonia (6/13/2017)      Acute on chronic respiratory failure with hypoxemia (HCC) (6/14/2017)      Malnutrition of moderate degree (Lyndy Limb: 60% to less than 75% of standard weight) ([facility] [address] (6/14/2017)      [facility] (6/14/2017)      Hypokalemia (6/14/2017)        Plan     Up in chair, PT eval  P.O steroids  Home when  O2 down to maintenance of 4 L. Keep current dose of steroids  Continue  Lactulose  Zofran prn  Remeron for sleep  O2 sats stabilizing ,O2 requirement less  Continue same Tx. D-Dimer up some, Duplex LE negative  Sodium improved, increased fluid allowance  Hyponatremia appears dilutional  Replace K  TSH OK  Continue other tx.   DNR        Elizabeth Jin MD  6/23/2017,

## 2017-06-23 NOTE — PROGRESS NOTES
Nutrition follow up/  Plan of care      RECOMMENDATIONS:     1. Cardiac, Mechanical Soft, 1500 ml Fluid restriction  2. Ensure Enlive daily  3. Monitor weight, labs and PO intake  4. RD to follow     GOALS:     1. Ongoing: PO intake meets >75% of protein/calorie needs by 6/28  2. Ongoing: Weight Maintenance (+/- 1-2# dry wt by 6/28)        ASSESSMENT:     Weight status is classified as normal per BMI of 24.3. PO intake is fair. Added Ensure Enlive daily for additional calories/protein. Labs reviewed. Nutrition recommendations listed. RD to follow. Nutrition Risk:  [] High  [x] Moderate []  Low    SUBJECTIVE/OBJECTIVE:      Patient with variable PO intake. Observed less than 25% intake of lunch meal. However, 75% intake of breakfast meal this morning per vitals. Reports his dentures are at home, is able to chew Mechanical Soft diet. Encouraged adequate intake. Will monitor. Information Obtained from:    [x] Chart Review   [x] Patient   [] Family/Caregiver   [] Nurse/Physician   [] Interdisciplinary Meeting/Rounds    Diet: Cardiac, Mechanical Soft, 1500 ml Fluid restriction  Medications: [x] Reviewed    Allergies: [x] Reviewed   Encounter Diagnoses     ICD-10-CM ICD-9-CM   1. COPD exacerbation ([facility] J44.1 491.21   2. Pneumonia of right lower lobe due to infectious organism J18.1 486   3.  Hyponatremia E87.1 276.1     Past Medical History:   Diagnosis Date    BPH (benign prostatic hyperplasia)     [facility]     History of blood clots     Hypercholesterolemia     Hypertension     Hypertensive cardiovascular disease     Hypothyroidism     Infection and inflammatory reaction due to indwelling urinary catheter, subsequent encounter     Peripheral vascular disease ([facility]     Pneumonia     Urinary retention     UTI (urinary tract infection)       Labs:    Lab Results   Component Value Date/Time    Sodium 142 06/21/2017 06:34 AM    Potassium 3.7 06/21/2017 06:34 AM Chloride 94 06/21/2017 06:34 AM    CO2 42 06/21/2017 06:34 AM    Anion gap 6 06/21/2017 06:34 AM    Glucose 161 06/21/2017 06:34 AM    BUN 69 06/21/2017 06:34 AM    Creatinine 1.83 06/21/2017 06:34 AM    Calcium 8.2 06/21/2017 06:34 AM    Magnesium 2.6 02/01/2016 02:10 PM    Phosphorus 2.5 02/22/2011 03:40 AM    Albumin 2.8 06/13/2017 12:20 PM     Anthropometrics: BMI (calculated): 24.3  Last 3 Recorded Weights in this Encounter    06/21/17 0612 06/22/17 0159 06/23/17 0558   Weight: 69.9 kg (154 lb) 71.1 kg (156 lb 12 oz) 70.3 kg (154 lb 15.7 oz)      Ht Readings from Last 1 Encounters:   06/17/17 5' 7\" (1.702 m)     Patient Vitals for the past 100 hrs:   % Diet Eaten   06/23/17 0952 75 %   06/22/17 1838 50 %   06/22/17 1421 20 %   06/22/17 1054 100 %   06/22/17 0941 95 %   06/21/17 1740 100 %   06/21/17 1401 0 %   06/20/17 1700 0 %   06/20/17 1216 25 %   06/20/17 0838 50 %   06/19/17 1851 50 %   06/19/17 1308 75 %     [] Weight Loss [] Weight Gain [x] Weight Stable    Nutrition Needs:   Calories: 1700 Kcal  Protein:   75 g      [x] No Cultural, Gnosticist or ethnic dietary need identified.     [] Cultural, Gnosticist and ethnic food preferences identified and addressed     Wt Status:  [x] Normal (18.6 - 24.9) [] Underweight (<18.5) [] Overweight (25 - 29.9) [] Mild Obesity (30 - 34.9)  [] Moderate Obesity (35 - 39.9) [] Morbid Obesity (40+)     Nutrition Problems Identified:   [x] Variable PO intake   [] Food Allergies  [x] Difficulty chewing/swallowing/poor dentition  [] Constipation/Diarrhea   [] Nausea/Vomiting   [] None  [] Other:     Plan:   [x] Therapeutic Diet  [x]  Obtained/adjusted food preferences/tolerances and/or snacks options   [x]  Supplements added   [] Occupational therapy following for feeding techniques  []  HS snack added   [x]  Modify diet texture   []  Modify diet for food allergies   []  Assist with menu selection   [x]  Monitor PO intake on meal rounds   [x]  Continue inpatient monitoring and intervention   []  Participated in discharge planning/Interdisciplinary rounds/Team meetings   []  Other:     Education Needs:   [x] Not appropriate for teaching at this time    [] Identified and addressed    Nutrition Monitoring and Evaluation:  [x] Continue ongoing monitoring and intervention  [] Other    Miriam Pulliam  Pager: [phone]

## 2017-06-23 NOTE — PROGRESS NOTES
Patient received communion per Tremaine Bonilla [facility]   Board Certified   [phone] - Office

## 2017-06-23 NOTE — PROGRESS NOTES
Problem: Mobility Impaired (Adult and Pediatric)  Goal: *Acute Goals and Plan of Care (Insert Text)  Physical Therapy Goals  Initiated 6/23/2017 and to be accomplished within 7 day(s)  1. Patient will move from supine to sit and sit to supine , scoot up and down and roll side to side in bed with modified independence. 2. Patient will transfer from bed to chair and chair to bed with supervision/set-up using the least restrictive device. 3. Patient will perform sit to stand with supervision/set-up. 4. Patient will ambulate with supervision/set-up for 50 feet with the least restrictive device. 5. Patient will ascend/descend 3 stairs with handrail(s) with minimal assistance/contact guard assist to egress home . Outcome: Progressing Towards Goal  PHYSICAL THERAPY EVALUATION     Patient: Marcelina Stern (58 y.o. male)  Date: 6/23/2017  Primary Diagnosis: COPD exacerbation (HCC)  Hyponatremia  Pneumonia        Precautions:   Fall (oxygen)      PROBLEM LIST:  Patient presents with the following problems:   Bed Mobility, Transfers, Gait, Strength, Balance, Stairs and Precautions  ASSESSMENT :   Patient requires between moderate assistance  and minimal assistance/contact guard assist for bed mobility, transfers and ambulation. Only able to ambulate side steps due  Fatigue and shortness of breathe needing mindy for safety and proper technique  Sat at edge of bed for 45 sec then requested to return to bed. No pain reported education on exercise to do in bed and plan of care while in hospital.  Left in bed with call light inreach   Patient will benefit from skilled intervention to address the above impairments.   Patients rehabilitation potential is considered to be Fair  Factors which may influence rehabilitation potential include:   [ ]         None noted  [ ]         Mental ability/status  [X]         Medical condition  [ ]         Home/family situation and support systems  [ ]         Safety awareness  [ ] Pain tolerance/management  [ ]         Other:        PLAN :  Recommendations and Planned Interventions:  [X]           Bed Mobility Training             [X]    Neuromuscular Re-Education  [X]           Transfer Training                   [ ]    Orthotic/Prosthetic Training  [X]           Gait Training                          [ ]    Modalities  [X]           Therapeutic Exercises          [ ]    Edema Management/Control  [X]           Therapeutic Activities            [X]    Patient and Family Training/Education  [ ]           Other (comment):     Frequency/Duration: Patient will be followed by physical therapy 3-5 times a week to address goals. Discharge Recommendations: Home Health  Further Equipment Recommendations for Discharge: N/A       SUBJECTIVE:   Patient stated .      OBJECTIVE DATA SUMMARY:       Past Medical History:   Diagnosis Date    BPH (benign prostatic hyperplasia)      [facility]      History of blood clots      Hypercholesterolemia      Hypertension      Hypertensive cardiovascular disease      Hypothyroidism      Infection and inflammatory reaction due to indwelling urinary catheter, subsequent encounter      Peripheral vascular disease ([facility] [address]      Pneumonia      Urinary retention      UTI (urinary tract infection)     History reviewed. No pertinent surgical history. Barriers to Learning/Limitations: yes;  sensory deficits-vision/hearing/speech and physical  Compensate with: visual, verbal, tactile, kinesthetic cues/model     G CODES:Mobility  Current  CL= 60-79%   Goal  CJ= 20-39%. The severity rating is based on the [employer] Balance Scale2/5   [facility] Standing Balance Scale2/5  0: Pt performs 25% or less of standing activity (Max assist) CN, 100% impaired. 1: Pt supports self with upper extremities but requires therapist assistance.  Pt performs 25-50% of effort (Mod assist) CM, 80% to <100% impaired. 1+: Pt supports self with upper extremities but requires therapist assistance. Pt performs >50% effort. (Min assist). CL, 60% to <80% impaired. 2: Pt supports self independently with both upper extremities (walker, crutches, parallel bars). CL, 60% to <80% impaired. 2+: Pt support self independently with 1 upper extremity (cane, crutch, 1 parallel bar). CK, 40% to <60% impaired. 3: Pt stands without upper extremity support for up to 30 seconds. CK, 40% to <60% impaired. 3+: Pt stands without upper extremity support for 30 seconds or greater. CJ, 20% to <40% impaired. 4: Pt independently moves and returns center of gravity 1-2 inches in one plane. CJ, 20% to <40% impaired. 4+: Pt independently moves and returns center of gravity 1-2 inches in multiple planes. CI, 1% to <20% impaired. 5: Pt independently moves and returns center of gravity in all planes greater than 2 inches. CH, 0% impaired. Eval Complexity: History: HIGH Complexity :3+ comorbidities / personal factors will impact the outcome/ POC Exam:MEDIUM Complexity : 3 Standardized tests and measures addressing body structure, function, activity limitation and / or participation in recreation  Presentation: MEDIUM Complexity : Evolving with changing characteristics  Clinical Decision Making:Medium Complexity [facility] Standing Balance Scale2/5 Overall Complexity:MEDIUM     Prior Level of Function/Home Situation: I with adl's and ambualtion to bathroom with walker on oxygen at all times.    Home Situation  Home Environment: Private residence  # Steps to Enter: 3  One/Two Story Residence: One story  Living Alone: No  Support Systems: Child(eduardo), Family member(s), Friends \ neighbors  Patient Expects to be Discharged to[de-identified] Private residence  Current DME Used/Available at Home: Oxygen, portable, Walker, Wheelchair, power  Critical Behavior:  Neurologic State: Alert  Orientation Level: Oriented X4  Cognition: Follows commands  Safety/Judgement: Fall prevention  Psychosocial  Patient Behaviors: Calm; Cooperative  Needs Expressed: Educational  Purposeful Interaction: Yes  Pt Identified Daily Priority: Clinical issues (comment)  Caritas Process: Nurture loving kindness;Enable ab/hope;Establish trust;Teaching/learning; Attend basic human needs;Create healing environment  Caring Interventions: Reassure; Therapeutic modalities  Reassure: Therapeutic listening; Informing; Instilling ab and hope; Acceptance; Support family  Therapeutic Modalities: Humor; Intentional therapeutic touch  Skin Condition/Temp: Dry;Fragile  Skin Integrity: Intact  Skin Integumentary  Skin Color: Appropriate for ethnicity; Ecchymosis (comment)  Skin Condition/Temp: Dry;Fragile  Skin Integrity: Intact  Turgor: Epidermis thin w/ loss of subcut tissue  Hair Growth: Present  Varicosities: Absent  Strength:    Strength: Generally decreased, functional (3+/5 both legs )  Tone & Sensation:   Tone: Normal  Sensation: Intact  Range Of Motion:  AROM: Generally decreased, functional  PROM: Generally decreased, functional  Functional Mobility:  Bed Mobility:  Rolling: Moderate assistance;Minimum assistance; Additional time;Assist x1  Supine to Sit: Minimum assistance; Moderate assistance; Additional time;Assist x1  Sit to Supine: Minimum assistance; Moderate assistance; Additional time;Assist x1  Transfers:  Sit to Stand: Moderate assistance; Additional time;Assist x1  Stand to Sit: Moderate assistance; Additional time;Assist x1  Balance:   Sitting: Impaired  Sitting - Static: Fair (occasional); Good (unsupported)  Sitting - Dynamic: Fair (occasional)  Standing: Impaired  Standing - Static: Poor; Fair  Standing - Dynamic : Poor  Ambulation/Gait Training:  Distance (ft): 3 Feet (ft)  Assistive Device: Walker, rolling (oxygen hi flow )  Ambulation - Level of Assistance: Minimal assistance; Additional time;Assist x1; Moderate assistance  Gait Description (WDL): Exceptions to [facility]  Gait Abnormalities: Decreased step clearance;Shuffling gait (sidesteps only )  Base of Support: Center of gravity altered  Speed/Natasha: Delayed;Pace decreased (<100 feet/min)  Step Length: Left shortened;Right shortened  Interventions: Safety awareness training;Verbal cues; Visual/Demos  Therapeutic Exercises:   ankel pumps glut sets and quad sets to do in bed on own  Pain:  Pre treatment pain level:0  Post treatment pain level:0  Pain Scale 1: Numeric (0 - 10)  Pain Intensity 1: 0  Activity Tolerance:   Fair minus  Please refer to the flowsheet for vital signs taken during this treatment. After treatment:   [ ]         Patient left in no apparent distress sitting up in chair  [X]         Patient left in no apparent distress in bed  [X]         Call bell left within reach  [X]         Nursing notified  [ ]         Caregiver present  [ ]         Bed alarm activated      COMMUNICATION/EDUCATION:   [X]         Fall prevention education was provided and the patient/caregiver indicated understanding. [X]         Patient/family have participated as able in goal setting and plan of care. [X]         Patient/family agree to work toward stated goals and plan of care. [ ]         Patient understands intent and goals of therapy, but is neutral about his/her participation. [ ]         Patient is unable to participate in goal setting and plan of care. Patient educated on the role of physical therapy during the acute stay  and the importance of mobility. VU. Needs reinforcement.       Thank you for this referral.  Rickey Hernández, PT   Time Calculation: 21 mins

## 2017-06-23 NOTE — ROUTINE PROCESS
0710  Bedside and Verbal shift change report given to Yomi Abraham (oncoming nurse) by Jesse Xavier (offgoing nurse). Report included the following information SBAR, Kardex, Intake/Output and MAR.     0830  Shift assessment complete. Pt resting quietly with family at bedside. 1530  Reassessment complete. No change in pt condition    1900  Bedside and Verbal shift change report given to Roxanna Williamson (oncoming nurse) by Yomi Abraham (offgoing nurse). Report included the following information SBAR, Kardex, Intake/Output and MAR.

## 2017-06-23 NOTE — PROGRESS NOTES
Chart reviewed. Joaquina Aguilera remains home with his dtr/family support when medically stable.  Will cont to follow for any needs.  Melissa Cope RN,ext. [phone].

## 2017-06-24 PROCEDURE — 65660000000 HC RM CCU STEPDOWN

## 2017-06-24 PROCEDURE — 74011636637 HC RX REV CODE- 636/637: Performed by: INTERNAL MEDICINE

## 2017-06-24 PROCEDURE — 74011250636 HC RX REV CODE- 250/636: Performed by: INTERNAL MEDICINE

## 2017-06-24 PROCEDURE — 74011250637 HC RX REV CODE- 250/637: Performed by: INTERNAL MEDICINE

## 2017-06-24 PROCEDURE — 94640 AIRWAY INHALATION TREATMENT: CPT

## 2017-06-24 PROCEDURE — 94760 N-INVAS EAR/PLS OXIMETRY 1: CPT

## 2017-06-24 PROCEDURE — 74011000258 HC RX REV CODE- 258: Performed by: INTERNAL MEDICINE

## 2017-06-24 PROCEDURE — 74011000250 HC RX REV CODE- 250: Performed by: INTERNAL MEDICINE

## 2017-06-24 RX ADMIN — IPRATROPIUM BROMIDE AND ALBUTEROL SULFATE 3 ML: .5; 3 SOLUTION RESPIRATORY (INHALATION) at 04:00

## 2017-06-24 RX ADMIN — IPRATROPIUM BROMIDE AND ALBUTEROL SULFATE 3 ML: .5; 3 SOLUTION RESPIRATORY (INHALATION) at 23:02

## 2017-06-24 RX ADMIN — BUDESONIDE 500 MCG: 0.5 INHALANT RESPIRATORY (INHALATION) at 07:09

## 2017-06-24 RX ADMIN — LACTULOSE 20 G: 20 SOLUTION ORAL at 08:30

## 2017-06-24 RX ADMIN — IPRATROPIUM BROMIDE AND ALBUTEROL SULFATE 3 ML: .5; 3 SOLUTION RESPIRATORY (INHALATION) at 15:34

## 2017-06-24 RX ADMIN — FUROSEMIDE 40 MG: 40 TABLET ORAL at 08:31

## 2017-06-24 RX ADMIN — MIRTAZAPINE 15 MG: 15 TABLET, FILM COATED ORAL at 21:34

## 2017-06-24 RX ADMIN — ARFORMOTEROL TARTRATE 15 MCG: 15 SOLUTION RESPIRATORY (INHALATION) at 19:24

## 2017-06-24 RX ADMIN — IPRATROPIUM BROMIDE AND ALBUTEROL SULFATE 3 ML: .5; 3 SOLUTION RESPIRATORY (INHALATION) at 19:24

## 2017-06-24 RX ADMIN — PREDNISONE 20 MG: 20 TABLET ORAL at 17:45

## 2017-06-24 RX ADMIN — DILTIAZEM HYDROCHLORIDE 180 MG: 180 CAPSULE, EXTENDED RELEASE ORAL at 10:17

## 2017-06-24 RX ADMIN — IPRATROPIUM BROMIDE AND ALBUTEROL SULFATE 3 ML: .5; 3 SOLUTION RESPIRATORY (INHALATION) at 07:09

## 2017-06-24 RX ADMIN — PIPERACILLIN SODIUM,TAZOBACTAM SODIUM 3.38 G: 3; .375 INJECTION, POWDER, FOR SOLUTION INTRAVENOUS at 01:38

## 2017-06-24 RX ADMIN — HEPARIN SODIUM 5000 UNITS: 5000 INJECTION, SOLUTION INTRAVENOUS; SUBCUTANEOUS at 17:46

## 2017-06-24 RX ADMIN — PIPERACILLIN SODIUM,TAZOBACTAM SODIUM 3.38 G: 3; .375 INJECTION, POWDER, FOR SOLUTION INTRAVENOUS at 12:41

## 2017-06-24 RX ADMIN — ARFORMOTEROL TARTRATE 15 MCG: 15 SOLUTION RESPIRATORY (INHALATION) at 07:18

## 2017-06-24 RX ADMIN — FINASTERIDE 5 MG: 5 TABLET, FILM COATED ORAL at 12:45

## 2017-06-24 RX ADMIN — LACTULOSE 20 G: 20 SOLUTION ORAL at 17:45

## 2017-06-24 RX ADMIN — PIPERACILLIN SODIUM,TAZOBACTAM SODIUM 3.38 G: 3; .375 INJECTION, POWDER, FOR SOLUTION INTRAVENOUS at 18:00

## 2017-06-24 RX ADMIN — BUDESONIDE 500 MCG: 0.5 INHALANT RESPIRATORY (INHALATION) at 19:24

## 2017-06-24 RX ADMIN — ASPIRIN 81 MG 81 MG: 81 TABLET ORAL at 08:31

## 2017-06-24 RX ADMIN — IPRATROPIUM BROMIDE AND ALBUTEROL SULFATE 3 ML: .5; 3 SOLUTION RESPIRATORY (INHALATION) at 00:03

## 2017-06-24 RX ADMIN — PREDNISONE 20 MG: 20 TABLET ORAL at 08:31

## 2017-06-24 RX ADMIN — FUROSEMIDE 40 MG: 40 TABLET ORAL at 17:45

## 2017-06-24 RX ADMIN — PANTOPRAZOLE SODIUM 40 MG: 40 TABLET, DELAYED RELEASE ORAL at 08:31

## 2017-06-24 RX ADMIN — IPRATROPIUM BROMIDE AND ALBUTEROL SULFATE 3 ML: .5; 3 SOLUTION RESPIRATORY (INHALATION) at 11:51

## 2017-06-24 RX ADMIN — CLOPIDOGREL BISULFATE 75 MG: 75 TABLET ORAL at 08:31

## 2017-06-24 RX ADMIN — DOCUSATE SODIUM 100 MG: 100 CAPSULE, LIQUID FILLED ORAL at 08:31

## 2017-06-24 RX ADMIN — LEVOTHYROXINE SODIUM 50 MCG: 50 TABLET ORAL at 08:31

## 2017-06-24 RX ADMIN — HEPARIN SODIUM 5000 UNITS: 5000 INJECTION, SOLUTION INTRAVENOUS; SUBCUTANEOUS at 08:31

## 2017-06-24 RX ADMIN — TAMSULOSIN HYDROCHLORIDE 0.4 MG: 0.4 CAPSULE ORAL at 21:34

## 2017-06-24 NOTE — ROUTINE PROCESS
0745  Bedside and Verbal shift change report given to Claudene Gulling (oncoming nurse) by Yomi Gann (offgoing nurse). Report included the following information SBAR, Kardex, Intake/Output and MAR.     0900  Shift assessment complete and due meds given. Pt denies and pain at this time. Call bell within reach    1500  Reassessment complete.   No change in pt condition

## 2017-06-24 NOTE — PROGRESS NOTES
6/24/2017  10:55 AM    :  Placed patient on 15Lpm highflow salter.  :  Heart rate 85 Oxygen Saturation 96%. :  Will continue to monitor. RN notified.

## 2017-06-24 NOTE — PROGRESS NOTES
Progress Note    Cross Cover for Dr Michela Rush    Patient: Miguel Elam               Sex: male          DOA: 6/13/2017       YOB: 1929      Age:  80 y.o.        LOS:  LOS: 11 days             CHIEF COMPLAINT:    Subjective:     Patient is getting breathing treatment  He feels okay    Objective:      Visit Vitals    BP (!) 149/92 (BP 1 Location: Right arm)    Pulse 85    Temp 98.8 °F (37.1 °C)    Resp 19    Ht 5' 7\" (1.702 m)    Wt 71.2 kg (156 lb 15.5 oz)    SpO2 95%    BMI 24.58 kg/m2       Physical Exam:  Gen:  No distress, no complaint  Lungs:  Clear bilaterally, no wheeze or rhonchi  Heart:  Regular rate and rhythm, no murmurs or gallops  Abdomen:  Soft, non-tender, normal bowel sounds        Lab/Data Reviewed:    Labs reviewed        Assessment/Plan     Principal Problem:    COPD exacerbation ([facility] [address] (2/1/2016)    Active Problems:    Hyponatremia (6/13/2017)      Pneumonia (6/13/2017)      Acute on chronic respiratory failure with hypoxemia (HCC) (6/14/2017)      Malnutrition of moderate degree ([place]: 60% to less than 75% of standard weight) ([facility] [address] (6/14/2017)      CHELY (acute kidney injury) ([facility] [address] (6/14/2017)      Hypokalemia (6/14/2017)        Plan:  Patient feels he is breathing a little better overall. He normally walks with a walker at home. Currently he does not feel his is strong enough to leave the hospital  Continue breathing treatments.

## 2017-06-25 PROCEDURE — 94640 AIRWAY INHALATION TREATMENT: CPT

## 2017-06-25 PROCEDURE — 65660000000 HC RM CCU STEPDOWN

## 2017-06-25 PROCEDURE — 77010033678 HC OXYGEN DAILY

## 2017-06-25 PROCEDURE — 74011000258 HC RX REV CODE- 258: Performed by: INTERNAL MEDICINE

## 2017-06-25 PROCEDURE — 94760 N-INVAS EAR/PLS OXIMETRY 1: CPT

## 2017-06-25 PROCEDURE — 74011636637 HC RX REV CODE- 636/637: Performed by: INTERNAL MEDICINE

## 2017-06-25 PROCEDURE — 77010033711 HC HIGH FLOW OXYGEN

## 2017-06-25 PROCEDURE — 74011000250 HC RX REV CODE- 250: Performed by: INTERNAL MEDICINE

## 2017-06-25 PROCEDURE — 74011250637 HC RX REV CODE- 250/637: Performed by: INTERNAL MEDICINE

## 2017-06-25 PROCEDURE — 74011250636 HC RX REV CODE- 250/636: Performed by: INTERNAL MEDICINE

## 2017-06-25 PROCEDURE — 74011000250 HC RX REV CODE- 250: Performed by: HOSPITALIST

## 2017-06-25 RX ORDER — IPRATROPIUM BROMIDE AND ALBUTEROL SULFATE 2.5; .5 MG/3ML; MG/3ML
3 SOLUTION RESPIRATORY (INHALATION)
Status: DISCONTINUED | OUTPATIENT
Start: 2017-06-25 | End: 2017-06-27 | Stop reason: HOSPADM

## 2017-06-25 RX ADMIN — FUROSEMIDE 40 MG: 40 TABLET ORAL at 09:49

## 2017-06-25 RX ADMIN — PIPERACILLIN SODIUM,TAZOBACTAM SODIUM 3.38 G: 3; .375 INJECTION, POWDER, FOR SOLUTION INTRAVENOUS at 02:05

## 2017-06-25 RX ADMIN — ASPIRIN 81 MG 81 MG: 81 TABLET ORAL at 09:49

## 2017-06-25 RX ADMIN — HEPARIN SODIUM 5000 UNITS: 5000 INJECTION, SOLUTION INTRAVENOUS; SUBCUTANEOUS at 05:33

## 2017-06-25 RX ADMIN — LACTULOSE 20 G: 20 SOLUTION ORAL at 09:49

## 2017-06-25 RX ADMIN — IPRATROPIUM BROMIDE AND ALBUTEROL SULFATE 3 ML: .5; 3 SOLUTION RESPIRATORY (INHALATION) at 13:35

## 2017-06-25 RX ADMIN — FUROSEMIDE 40 MG: 40 TABLET ORAL at 16:19

## 2017-06-25 RX ADMIN — CLOPIDOGREL BISULFATE 75 MG: 75 TABLET ORAL at 09:49

## 2017-06-25 RX ADMIN — LACTULOSE 20 G: 20 SOLUTION ORAL at 18:58

## 2017-06-25 RX ADMIN — LEVOFLOXACIN 750 MG: 750 TABLET, FILM COATED ORAL at 16:19

## 2017-06-25 RX ADMIN — IPRATROPIUM BROMIDE AND ALBUTEROL SULFATE 3 ML: .5; 3 SOLUTION RESPIRATORY (INHALATION) at 19:50

## 2017-06-25 RX ADMIN — DOCUSATE SODIUM 100 MG: 100 CAPSULE, LIQUID FILLED ORAL at 09:49

## 2017-06-25 RX ADMIN — PANTOPRAZOLE SODIUM 40 MG: 40 TABLET, DELAYED RELEASE ORAL at 09:49

## 2017-06-25 RX ADMIN — DILTIAZEM HYDROCHLORIDE 180 MG: 180 CAPSULE, EXTENDED RELEASE ORAL at 09:49

## 2017-06-25 RX ADMIN — BUDESONIDE 500 MCG: 0.5 INHALANT RESPIRATORY (INHALATION) at 19:50

## 2017-06-25 RX ADMIN — MIRTAZAPINE 15 MG: 15 TABLET, FILM COATED ORAL at 22:32

## 2017-06-25 RX ADMIN — FINASTERIDE 5 MG: 5 TABLET, FILM COATED ORAL at 12:03

## 2017-06-25 RX ADMIN — TAMSULOSIN HYDROCHLORIDE 0.4 MG: 0.4 CAPSULE ORAL at 22:32

## 2017-06-25 RX ADMIN — BUDESONIDE 500 MCG: 0.5 INHALANT RESPIRATORY (INHALATION) at 07:00

## 2017-06-25 RX ADMIN — PREDNISONE 20 MG: 20 TABLET ORAL at 16:19

## 2017-06-25 RX ADMIN — IPRATROPIUM BROMIDE AND ALBUTEROL SULFATE 3 ML: .5; 3 SOLUTION RESPIRATORY (INHALATION) at 03:25

## 2017-06-25 RX ADMIN — PIPERACILLIN SODIUM,TAZOBACTAM SODIUM 3.38 G: 3; .375 INJECTION, POWDER, FOR SOLUTION INTRAVENOUS at 09:47

## 2017-06-25 RX ADMIN — ARFORMOTEROL TARTRATE 15 MCG: 15 SOLUTION RESPIRATORY (INHALATION) at 07:12

## 2017-06-25 RX ADMIN — ARFORMOTEROL TARTRATE 15 MCG: 15 SOLUTION RESPIRATORY (INHALATION) at 19:50

## 2017-06-25 RX ADMIN — LEVOTHYROXINE SODIUM 50 MCG: 50 TABLET ORAL at 09:49

## 2017-06-25 RX ADMIN — IPRATROPIUM BROMIDE AND ALBUTEROL SULFATE 3 ML: .5; 3 SOLUTION RESPIRATORY (INHALATION) at 07:00

## 2017-06-25 RX ADMIN — PREDNISONE 20 MG: 20 TABLET ORAL at 09:49

## 2017-06-25 RX ADMIN — PIPERACILLIN SODIUM,TAZOBACTAM SODIUM 3.38 G: 3; .375 INJECTION, POWDER, FOR SOLUTION INTRAVENOUS at 18:58

## 2017-06-25 NOTE — PROGRESS NOTES
Problem: Mobility Impaired (Adult and Pediatric)  Goal: *Acute Goals and Plan of Care (Insert Text)  Physical Therapy Goals  Initiated 6/23/2017 and to be accomplished within 7 day(s)  1. Patient will move from supine to sit and sit to supine , scoot up and down and roll side to side in bed with modified independence. 2. Patient will transfer from bed to chair and chair to bed with supervision/set-up using the least restrictive device. 3. Patient will perform sit to stand with supervision/set-up. 4. Patient will ambulate with supervision/set-up for 50 feet with the least restrictive device. 5. Patient will ascend/descend 3 stairs with handrail(s) with minimal assistance/contact guard assist to egress home . 1547 - Pt refusing PT at this time, stating \"Tomorrow not today. \" in a loud tone of voice. [phone] [facility] attempted to motivate pt to participate with PT and pt continues to decline. Will f/u at later date.

## 2017-06-25 NOTE — PROGRESS NOTES
Patient is titrated  down to 12LPM  Highflow  Salter. Oxygen Saturation 99%. Heart 89. Will continue to monitor. RN notified.

## 2017-06-25 NOTE — PROGRESS NOTES
1930: Bedside report received from off going nurse, Gerrit Schirmer, [facility]. Patient resting in bed at this time with no complaints of pain/discomfort. RT in room administering breathing treatment. 2045: Shift assessment completed. 2134: HS medications administered per order. Denies pain/discomfort at this time. 2300: Patient resting in bed with eyes closed. No signs/symptoms of distress. 0115: Basin bed bath completed. Incontinent of urine. Linens and incontinence pad changed. Small brown BM noted. Stool impaction noted in rectum. Patient denies pain or discomfort. States he does not feel the urge to have a BM.     0305: Dr. Eriberto Summers paged regarding fecal impaction. Awaiting return call. 0350: Dr. Eriberto Summers returned page. Fecal Disimpaction order placed. 8100: Fecal disimpaction performed. Patient tolerated well. Moderate amount of hard stool removed from rectum. Soft stool felt behind impaction. Will monitor for incontinence and further impaction. 0540: Patient requesting coffee, provided. 0630: Patient resting in bed with eyes closed. No signs/symptoms of distress. 0720: Bedside report given to oncoming nurse, Gerrit Schirmer, RN. RT in room administering breathing treatment. Patient with no complaints at this time.

## 2017-06-25 NOTE — PROGRESS NOTES
Progress Note    CROSS COVER FOR DR Nicolasa Cordova    Patient: Katya Riddle               Sex: male          DOA: 6/13/2017       YOB: 1929      Age:  80 y.o.        LOS:  LOS: 12 days             CHIEF COMPLAINT:  COPD exacerbation    Subjective:     Patient feels he is breathing better today compared with yesterday  He is not significantly mobilizing at this point. Objective:      Visit Vitals    /62    Pulse 68    Temp 97.8 °F (36.6 °C)    Resp 18    Ht 5' 7\" (1.702 m)    Wt 70.8 kg (156 lb)    SpO2 99%    BMI 24.43 kg/m2       Physical Exam:  Gen:  No distress today, no complaint. Lungs:  Clear bilaterally, no wheezes or rales. Heart:  Regular rate and rhythm. No murmur. Ext:  Red, serpiginous rash involving both feet R>L         Lab/Data Reviewed:    Labs reviewed        Assessment/Plan     Principal Problem:    COPD exacerbation ([address] (2/1/2016)    Active Problems:    Hyponatremia (6/13/2017)      Pneumonia (6/13/2017)      Acute on chronic respiratory failure with hypoxemia (HCC) (6/14/2017)      Malnutrition of moderate degree (Cherylle Hsawn: 60% to less than 75% of standard weight) ([facility] [address] (6/14/2017)      CHELY (acute kidney injury) ([facility] [address] (6/14/2017)      Hypokalemia (6/14/2017)    Yeast dermatitis    Plan:  Continue breathing treatments   Add anti-fungal, he apparently got this last time he was in the hospital also.   Continue with mobilization

## 2017-06-25 NOTE — ROUTINE PROCESS
0700  Bedside and Verbal shift change report given to Andre Evans (oncoming nurse) by Samy Mitchell (offgoing nurse). Report included the following information SBAR, Kardex, Intake/Output and MAR. Pt resting comfortably at this time. RT in room. 0900  Shift assessment complete and due meds given. Pt set up with meal tray and has no complaints at this time. Call bell within reach    1500  Reassessment complete. No change in pt condition    1900  Bedside and Verbal shift change report given to Nedra FLOWERS (oncoming nurse) by Andre Evans (offgoing nurse). Report included the following information SBAR, Kardex, Intake/Output and MAR.

## 2017-06-26 PROCEDURE — 94640 AIRWAY INHALATION TREATMENT: CPT

## 2017-06-26 PROCEDURE — 97530 THERAPEUTIC ACTIVITIES: CPT

## 2017-06-26 PROCEDURE — 74011000250 HC RX REV CODE- 250: Performed by: HOSPITALIST

## 2017-06-26 PROCEDURE — 74011250636 HC RX REV CODE- 250/636: Performed by: INTERNAL MEDICINE

## 2017-06-26 PROCEDURE — 94760 N-INVAS EAR/PLS OXIMETRY 1: CPT

## 2017-06-26 PROCEDURE — 74011000250 HC RX REV CODE- 250: Performed by: INTERNAL MEDICINE

## 2017-06-26 PROCEDURE — 74011250637 HC RX REV CODE- 250/637: Performed by: INTERNAL MEDICINE

## 2017-06-26 PROCEDURE — 74011636637 HC RX REV CODE- 636/637: Performed by: INTERNAL MEDICINE

## 2017-06-26 PROCEDURE — 74011000258 HC RX REV CODE- 258: Performed by: INTERNAL MEDICINE

## 2017-06-26 PROCEDURE — 77010033711 HC HIGH FLOW OXYGEN

## 2017-06-26 PROCEDURE — 77010033678 HC OXYGEN DAILY

## 2017-06-26 PROCEDURE — 74011250637 HC RX REV CODE- 250/637: Performed by: HOSPITALIST

## 2017-06-26 PROCEDURE — 65660000000 HC RM CCU STEPDOWN

## 2017-06-26 RX ORDER — AMOXICILLIN AND CLAVULANATE POTASSIUM 500; 125 MG/1; MG/1
1 TABLET, FILM COATED ORAL EVERY 12 HOURS
Status: DISCONTINUED | OUTPATIENT
Start: 2017-06-26 | End: 2017-06-27 | Stop reason: HOSPADM

## 2017-06-26 RX ADMIN — PANTOPRAZOLE SODIUM 40 MG: 40 TABLET, DELAYED RELEASE ORAL at 06:43

## 2017-06-26 RX ADMIN — AMOXICILLIN AND CLAVULANATE POTASSIUM 1 TABLET: 500; 125 TABLET, FILM COATED ORAL at 22:09

## 2017-06-26 RX ADMIN — LACTULOSE 20 G: 20 SOLUTION ORAL at 17:34

## 2017-06-26 RX ADMIN — Medication 3 MG: at 22:09

## 2017-06-26 RX ADMIN — IPRATROPIUM BROMIDE AND ALBUTEROL SULFATE 3 ML: .5; 3 SOLUTION RESPIRATORY (INHALATION) at 01:49

## 2017-06-26 RX ADMIN — LEVOTHYROXINE SODIUM 50 MCG: 50 TABLET ORAL at 06:43

## 2017-06-26 RX ADMIN — ARFORMOTEROL TARTRATE 15 MCG: 15 SOLUTION RESPIRATORY (INHALATION) at 19:43

## 2017-06-26 RX ADMIN — ARFORMOTEROL TARTRATE 15 MCG: 15 SOLUTION RESPIRATORY (INHALATION) at 07:29

## 2017-06-26 RX ADMIN — FUROSEMIDE 40 MG: 40 TABLET ORAL at 06:42

## 2017-06-26 RX ADMIN — HEPARIN SODIUM 5000 UNITS: 5000 INJECTION, SOLUTION INTRAVENOUS; SUBCUTANEOUS at 17:33

## 2017-06-26 RX ADMIN — PIPERACILLIN SODIUM,TAZOBACTAM SODIUM 3.38 G: 3; .375 INJECTION, POWDER, FOR SOLUTION INTRAVENOUS at 02:15

## 2017-06-26 RX ADMIN — LACTULOSE 20 G: 20 SOLUTION ORAL at 08:59

## 2017-06-26 RX ADMIN — IPRATROPIUM BROMIDE AND ALBUTEROL SULFATE 3 ML: .5; 3 SOLUTION RESPIRATORY (INHALATION) at 19:43

## 2017-06-26 RX ADMIN — PREDNISONE 15 MG: 5 TABLET ORAL at 17:30

## 2017-06-26 RX ADMIN — ASPIRIN 81 MG 81 MG: 81 TABLET ORAL at 08:58

## 2017-06-26 RX ADMIN — CLOPIDOGREL BISULFATE 75 MG: 75 TABLET ORAL at 08:59

## 2017-06-26 RX ADMIN — DOCUSATE SODIUM 100 MG: 100 CAPSULE, LIQUID FILLED ORAL at 08:59

## 2017-06-26 RX ADMIN — HEPARIN SODIUM 5000 UNITS: 5000 INJECTION, SOLUTION INTRAVENOUS; SUBCUTANEOUS at 06:42

## 2017-06-26 RX ADMIN — IPRATROPIUM BROMIDE AND ALBUTEROL SULFATE 3 ML: .5; 3 SOLUTION RESPIRATORY (INHALATION) at 07:34

## 2017-06-26 RX ADMIN — BUDESONIDE 500 MCG: 0.5 INHALANT RESPIRATORY (INHALATION) at 07:28

## 2017-06-26 RX ADMIN — DILTIAZEM HYDROCHLORIDE 180 MG: 180 CAPSULE, EXTENDED RELEASE ORAL at 08:58

## 2017-06-26 RX ADMIN — PREDNISONE 20 MG: 20 TABLET ORAL at 08:58

## 2017-06-26 RX ADMIN — AMOXICILLIN AND CLAVULANATE POTASSIUM 1 TABLET: 500; 125 TABLET, FILM COATED ORAL at 14:52

## 2017-06-26 RX ADMIN — FUROSEMIDE 40 MG: 40 TABLET ORAL at 17:30

## 2017-06-26 RX ADMIN — FINASTERIDE 5 MG: 5 TABLET, FILM COATED ORAL at 11:09

## 2017-06-26 RX ADMIN — BUDESONIDE 500 MCG: 0.5 INHALANT RESPIRATORY (INHALATION) at 19:43

## 2017-06-26 RX ADMIN — TAMSULOSIN HYDROCHLORIDE 0.4 MG: 0.4 CAPSULE ORAL at 22:09

## 2017-06-26 RX ADMIN — MIRTAZAPINE 15 MG: 15 TABLET, FILM COATED ORAL at 22:09

## 2017-06-26 NOTE — PROGRESS NOTES
General Internal Medicine/Geriatrics    Patient: Maddi Egan MRN: [id number]  CSN: [id number]    YOB: 1929  Age: 80 y.o. Sex: male    DOA: 6/13/2017 LOS:  LOS: 13 days                    Subjective:     No new development  Comfortable  No CP  No further Vomiting  Pleasant, cooperative   coughing less     Review of Systems:  Eyes: negative  Ears, Nose, Mouth, Throat, and Face: negative  Respiratory: + cough,less  wheezing and dyspnea  Cardiovascular: negative for chest pain  Gastrointestinal: negative for nausea, vomiting and diarrhea  Genitourinary:negative for dysuria and hematuria  Integument/Breast: negative  Hematologic/Lymphatic: negative for bleeding  Musculoskeletal:negative for arthralgias  Neurological: negative for weakness  Behavioral/Psychiatric: negative for behavior problems  Endocrine: negative for temperature intolerance  Allergic/Immunologic: negative for hay fever    Objective:     Physical Exam:  Patient Vitals for the past 24 hrs:   Temp Pulse Resp BP SpO2   06/26/17 0732 - - - - 90 %   06/26/17 0616 97.5 °F (36.4 °C) 92 20 128/75 97 %   06/26/17 0150 - - - - 97 %   06/26/17 0146 97.8 °F (36.6 °C) 67 18 117/56 99 %   06/25/17 2150 98 °F (36.7 °C) 81 18 125/72 98 %   06/25/17 1952 - - - - 99 %   06/25/17 1801 98.4 °F (36.9 °C) 81 18 147/76 100 %   06/25/17 1445 98.3 °F (36.8 °C) 69 18 119/88 100 %   06/25/17 1337 - - - - 96 %   06/25/17 0944 97.8 °F (36.6 °C) 68 18 131/62 99 %     AF, VSS  O2 sats stable on 12 L. High flow  HEENT: wnl  NECK:  No venous distention or adenopathy   HEART: RRR, no rubs or gallops  LUNGS: improved air entry Decreased wheezes  ABDOMEN: soft with active BS. No tenderness, no distention, no organomegaly  EXT: warm,no edema  SKIN: Normal turgor, no breaks  NEURO: Awake, alert, non focal    Intake and Output:  Current Shift:     Last three shifts:  06/24 1901 - 06/26 0700  In: 1850 [P.O.:1250;  I.V.:600]  Out: -     No results found for this or any previous visit (from the past 24 hour(s)).         Current Facility-Administered Medications   Medication Dose Route Frequency    predniSONE (DELTASONE) tablet 15 mg  15 mg Oral BID WITH MEALS    amoxicillin-clavulanate (AUGMENTIN) 500-125 mg per tablet 1 Tab  1 Tab Oral Q12H    albuterol-ipratropium (DUO-NEB) 2.5 MG-0.5 MG/3 ML  3 mL Nebulization Q6H RT    albuterol-ipratropium (DUO-NEB) 2.5 MG-0.5 MG/3 ML  3 mL Nebulization Q4H PRN    lactulose (CHRONULAC) solution 20 g  30 mL Oral BID    mirtazapine (REMERON) tablet 15 mg  15 mg Oral QHS    melatonin tablet 3 mg  3 mg Oral QHS PRN    ondansetron (ZOFRAN) injection 4 mg  4 mg IntraVENous Q6H PRN    magnesium hydroxide (MILK OF MAGNESIA) 400 mg/5 mL oral suspension 30 mL  30 mL Oral DAILY PRN    docusate sodium (COLACE) capsule 100 mg  100 mg Oral DAILY    heparin (porcine) injection 5,000 Units  5,000 Units SubCUTAneous Q12H    pantoprazole (PROTONIX) tablet 40 mg  40 mg Oral ACB    budesonide (PULMICORT) 500 mcg/2 ml nebulizer suspension  500 mcg Nebulization BID RT    arformoterol (BROVANA) neb solution 15 mcg  15 mcg Nebulization BID RT    tamsulosin (FLOMAX) capsule 0.4 mg  0.4 mg Oral QHS    finasteride (PROSCAR) tablet 5 mg  5 mg Oral DAILY    aspirin chewable tablet 81 mg  81 mg Oral DAILY    furosemide (LASIX) tablet 40 mg  40 mg Oral ACB&D    clopidogrel (PLAVIX) tablet 75 mg  75 mg Oral DAILY    levothyroxine (SYNTHROID) tablet 50 mcg  50 mcg Oral ACB    dilTIAZem CD (CARDIZEM CD) capsule 180 mg  180 mg Oral DAILY       Lab Results   Component Value Date/Time    Glucose 161 06/21/2017 06:34 AM    Glucose 156 06/17/2017 04:35 AM    Glucose 127 06/15/2017 05:00 AM    Glucose 137 06/14/2017 05:15 AM    Glucose 142 06/13/2017 12:20 PM        Assessment     Principal Problem:    COPD exacerbation (HCC) (2/1/2016)    Active Problems:    Hyponatremia (6/13/2017)      Pneumonia (6/13/2017)      Acute on chronic respiratory failure with hypoxemia ([facility] [address] (6/14/2017)      Malnutrition of moderate degree ([place]: 60% to less than 75% of standard weight) ([facility] [address] (6/14/2017)      CHELY (acute kidney injury) ([facility] (6/14/2017)      Hypokalemia (6/14/2017)        Plan     Up in chair, PT eval  Decrease P.O steroids  Decreased O2 to 6 L, respiratory notified  P.O Augmentin  Continue  Lactulose  Zofran prn  Remeron for sleep  Continue same Tx. D-Dimer up some, Duplex LE negative  Sodium improved, increased fluid allowance  Hyponatremia appears dilutional  Replace K  TSH OK  Continue other tx.   DNR        Deven Sanon MD  6/26/2017,

## 2017-06-26 NOTE — PROGRESS NOTES
3895  Received bedside verbal shift report. Pt lying in bed resting comfortably. piv # 24 to right arm intact. 7lnc in place. Call bell within reach, side rails up x 3, bed low and locked. No complaints offered, pt instructed to call for assistance. 0330  Pt given hot cup of coffee. Resting comfortably reassessment completed, no changes. 0880  Pt incontinent of urine. Cleaned at this time, new bed pad and gown placed. 3203 Bedside and Verbal shift change report given to maria d epstein rn  (oncoming nurse) by Yasmin Delgado rn  (offgoing nurse). Report included the following information SBAR, Kardex, Intake/Output, MAR, Recent Results and Cardiac Rhythm afib.

## 2017-06-26 NOTE — PROGRESS NOTES
Chart reviewed. Plan remains home with family support when medically stable. Has home O2 @ 4L in place, will not be able to discharge on hi flow O2. Would benefit from home health, will need orders, thanks. Will cont to follow for needs. Melissa Cope RN,ext. [phone].

## 2017-06-26 NOTE — PROGRESS NOTES
Problem: Mobility Impaired (Adult and Pediatric)  Goal: *Acute Goals and Plan of Care (Insert Text)  Physical Therapy Goals  Initiated 6/23/2017 and to be accomplished within 7 day(s)  1. Patient will move from supine to sit and sit to supine , scoot up and down and roll side to side in bed with modified independence. 2. Patient will transfer from bed to chair and chair to bed with supervision/set-up using the least restrictive device. 3. Patient will perform sit to stand with supervision/set-up. 4. Patient will ambulate with supervision/set-up for 50 feet with the least restrictive device. 5. Patient will ascend/descend 3 stairs with handrail(s) with minimal assistance/contact guard assist to egress home . Outcome: Progressing Towards Goal  PHYSICAL THERAPY TREATMENT     Patient: Fran Jimenez (45 y.o. male)  Date: 6/26/2017  Diagnosis: COPD exacerbation (HCC)  Hyponatremia  Pneumonia COPD exacerbation (HCC)  Precautions: Fall  Chart, physical therapy assessment, plan of care and goals were reviewed. ASSESSMENT:  Cleared for PT by LEONIE Lala and Tele nurse. Mod A for supine to sit transfer. Sitting EOB with fair balance. Attempted sit to stand x2 max A x2 with walker. Unable to achieve full standing. Returned to supine in bed min A secondary to increased work of breathing and elevated heart rate 147. Cues for slow deep breathing and rest from activity. Sidelying in bed; all needs in reach. Educated on call bell if in distress or needing assistance. HR decreasing with rest at end of session. RN tele aware of end of PT session. EDUCATION:   Progression toward goals:        Improving appropriately and progressing toward goals       PLAN:  Patient continues to benefit from skilled intervention to address the above impairments. Continue treatment per established plan of care.   Discharge Recommendations:  Delgado Hammer  Further Equipment Recommendations for Discharge:  walker SUBJECTIVE:   Agreeable to PT      OBJECTIVE DATA SUMMARY:   Critical Behavior:  Neurologic State: Alert  Orientation Level: Oriented to person, Oriented to place  Cognition: Follows commands  Safety/Judgement: Fall prevention, Decreased insight into deficits     [facility] Balance Scale 2/5  0: Pt performs 25% or less of sitting activity (Max assist) CN, 100% impaired. 1: Pt supports self with upper extremities but requires therapist assistance. Pt performs 25-50% of effort (Mod assist) CM, 80% to <100% impaired. 1+: Pt supports self with upper extremities but requires therapist assistance. Pt performs >50% effort. (Min assist). CL, 60% to <80% impaired. 2: Pt supports self independently with both upper extremities. CL, 60% to <80% impaired. 2+: Pt support self independently with 1 upper extremity. CK, 40% to <60% impaired. 3: Pt sits without upper extremity support for up to 30 seconds. CK, 40% to <60% impaired. 3+: Pt sits without upper extremity support for 30 seconds or greater. CJ, 20% to <40% impaired. 4: Pt moves and returns trunkal midpoint 1-2 inches in one plane. CJ, 20% to <40% impaired. 4+: Pt moves and returns trunkal midpoint 1-2 inches in multiple planes. CI, 1% to <20% impaired. 5: Pt moves and returns trunkal midpoint in all planes greater than 2 inches. CH, 0% impaired. G CODE:Mobility [id number] Current  CL= 60-79%   Goal  CL= 60-79%.   The severity rating is based on the [facility] Sitting Balance Scale 2/5  Functional Mobility Training:  Bed Mobility:  Rolling: Supervision  Supine to Sit: Moderate assistance  Sit to Supine: Minimum assistance  Transfers:  Sit to Stand: Maximum assistance;Assist x2  Stand to Sit: Maximum assistance;Assist x2  Balance:  Sitting: Impaired  Sitting - Static: Fair (occasional)  Sitting - Dynamic: Fair (occasional)  Standing: Impaired  Standing - Static: Poor  Ambulation/Gait Training: (not tested secondary to vitals)  Neuro Re-Education:  Sitting balance  Standing balance  Therapeutic Exercises:   Seated BLE AROM  Vital Signs  Temp: 97.8 °F (36.6 °C)     Pulse (Heart Rate): (!) 147     BP: 122/54     Resp Rate: 20     O2 Sat (%): (!) 88 %  Pain:  Pain Scale 1: Numeric (0 - 10)  Pain Intensity 1: 0  Activity Tolerance:   Poor     After treatment:   Patient left in no apparent distress in bed  Call bell left within reach  Nursing notified      Tonie Casas, PT   Time Calculation: 19 mins

## 2017-06-26 NOTE — PROGRESS NOTES
conducted a Follow up consultation and Spiritual Assessment for Albina Barrow, who is a 80 y. o.,male. The  provided the following Interventions:  Continued the relationship of care and support. Listened empathically. Offered prayer and assurance of continued prayer on patients behalf. Chart reviewed. The following outcomes were achieved:  Patient expressed gratitude for pastoral care visit. Assessment:  There are no further spiritual or Ep[employer] issues which require Spiritual Care Services interventions at this time. Plan:  Chaplains will continue to follow and will provide pastoral care on an as needed/requested basis.  recommends bedside caregivers page  on duty if patient shows signs of acute spiritual or emotional distress.      [address] [facility]   Staff [address] [facility]   [phone]

## 2017-06-26 NOTE — ROUTINE PROCESS
1920: Assumed care. Assessment done. HOB elevated at 30 degrees. On hi flow oxygen at 12 LPM. Denies any pain or [discomfort at this time. Call light within reach. 2150: No change from previous assessment. 2232: Due meds given. 0146: No change from previous assessment. 0215: Due med given. 0430: Sleeping.    0616: No change from previous assessment. Slept on & off thru night. Needs attended. 0732: Bedside and Verbal shift change report given to Surekha Higgins RN (oncoming nurse) by Nara Hackett RN (offgoing nurse). Report included the following information SBAR, Kardex, Intake/Output, MAR and Recent Results.

## 2017-06-26 NOTE — ROUTINE PROCESS
0900 Patient was seen during shift change, on oxygen at 12 L hiflow nasal canula. Denies any pain. Repositioned comfortably in the bed.  1120 Patient resting in the bed. Repositioned comfortably. Call bell placed within reach. [address] in room, patient doesn't want to eat yet. Will offer later. 1400 Patient ate lunch. Repositioned comfortably in the bed.  1630 Patient resting in the bed. Repositioned and Oxygen canula put back to his nose. 1730 Family at bedside. Meds given. 1925 Bedside and Verbal shift change report given to Jaswant Guzmán RN (oncoming nurse) by Mecca Yuan RN (offgoing nurse). Report included the following information SBAR, Kardex, Intake/Output, MAR and Cardiac Rhythm Atrial Fibrillation.

## 2017-06-27 ENCOUNTER — HOSPITAL ENCOUNTER (INPATIENT)
Age: 82
LOS: 22 days | Discharge: HOME | End: 2017-07-19
Attending: INTERNAL MEDICINE | Admitting: INTERNAL MEDICINE

## 2017-06-27 VITALS
RESPIRATION RATE: 18 BRPM | HEART RATE: 100 BPM | WEIGHT: 155.42 LBS | SYSTOLIC BLOOD PRESSURE: 110 MMHG | OXYGEN SATURATION: 98 % | DIASTOLIC BLOOD PRESSURE: 64 MMHG | HEIGHT: 67 IN | BODY MASS INDEX: 24.39 KG/M2 | TEMPERATURE: 98.2 F

## 2017-06-27 PROCEDURE — 74011636637 HC RX REV CODE- 636/637: Performed by: INTERNAL MEDICINE

## 2017-06-27 PROCEDURE — 74011000250 HC RX REV CODE- 250: Performed by: HOSPITALIST

## 2017-06-27 PROCEDURE — 94760 N-INVAS EAR/PLS OXIMETRY 1: CPT

## 2017-06-27 PROCEDURE — 74011250637 HC RX REV CODE- 250/637: Performed by: INTERNAL MEDICINE

## 2017-06-27 PROCEDURE — 74011000250 HC RX REV CODE- 250: Performed by: INTERNAL MEDICINE

## 2017-06-27 PROCEDURE — 74011250636 HC RX REV CODE- 250/636: Performed by: INTERNAL MEDICINE

## 2017-06-27 PROCEDURE — 94640 AIRWAY INHALATION TREATMENT: CPT

## 2017-06-27 RX ORDER — GUAIFENESIN 100 MG/5ML
81 LIQUID (ML) ORAL DAILY
Status: DISCONTINUED | OUTPATIENT
Start: 2017-06-28 | End: 2017-07-19 | Stop reason: HOSPADM

## 2017-06-27 RX ORDER — TAMSULOSIN HYDROCHLORIDE 0.4 MG/1
0.4 CAPSULE ORAL DAILY
Status: DISCONTINUED | OUTPATIENT
Start: 2017-06-28 | End: 2017-07-19 | Stop reason: HOSPADM

## 2017-06-27 RX ORDER — DOCUSATE SODIUM 100 MG/1
100 CAPSULE, LIQUID FILLED ORAL 2 TIMES DAILY
Status: DISCONTINUED | OUTPATIENT
Start: 2017-06-28 | End: 2017-07-19 | Stop reason: HOSPADM

## 2017-06-27 RX ORDER — DOCUSATE SODIUM 100 MG/1
100 CAPSULE, LIQUID FILLED ORAL DAILY
Qty: 60 CAP | Refills: 0 | Status: SHIPPED
Start: 2017-06-27 | End: 2017-09-25

## 2017-06-27 RX ORDER — DILTIAZEM HYDROCHLORIDE 180 MG/1
180 CAPSULE, COATED, EXTENDED RELEASE ORAL DAILY
Status: DISCONTINUED | OUTPATIENT
Start: 2017-06-28 | End: 2017-07-19 | Stop reason: HOSPADM

## 2017-06-27 RX ORDER — MIRTAZAPINE 15 MG/1
15 TABLET, FILM COATED ORAL
Status: DISCONTINUED | OUTPATIENT
Start: 2017-06-27 | End: 2017-07-19 | Stop reason: HOSPADM

## 2017-06-27 RX ORDER — PREDNISONE 5 MG/1
15 TABLET ORAL 2 TIMES DAILY WITH MEALS
Qty: 90 TAB | Refills: 0 | Status: SHIPPED | OUTPATIENT
Start: 2017-06-27 | End: 2017-07-19

## 2017-06-27 RX ORDER — ARFORMOTEROL TARTRATE 15 UG/2ML
15 SOLUTION RESPIRATORY (INHALATION)
Status: DISCONTINUED | OUTPATIENT
Start: 2017-06-28 | End: 2017-07-19 | Stop reason: HOSPADM

## 2017-06-27 RX ORDER — FINASTERIDE 5 MG/1
5 TABLET, FILM COATED ORAL DAILY
Status: DISCONTINUED | OUTPATIENT
Start: 2017-06-28 | End: 2017-07-19 | Stop reason: HOSPADM

## 2017-06-27 RX ORDER — CLOPIDOGREL BISULFATE 75 MG/1
75 TABLET ORAL DAILY
Status: DISCONTINUED | OUTPATIENT
Start: 2017-06-28 | End: 2017-07-19 | Stop reason: HOSPADM

## 2017-06-27 RX ORDER — BUDESONIDE 0.5 MG/2ML
500 INHALANT ORAL
Status: DISCONTINUED | OUTPATIENT
Start: 2017-06-28 | End: 2017-07-19 | Stop reason: HOSPADM

## 2017-06-27 RX ORDER — FLUTICASONE PROPIONATE AND SALMETEROL 500; 50 UG/1; UG/1
1 POWDER RESPIRATORY (INHALATION) 2 TIMES DAILY
Status: DISCONTINUED | OUTPATIENT
Start: 2017-06-28 | End: 2017-06-27 | Stop reason: CLARIF

## 2017-06-27 RX ORDER — LEVOTHYROXINE SODIUM 50 UG/1
50 TABLET ORAL
Status: DISCONTINUED | OUTPATIENT
Start: 2017-06-28 | End: 2017-06-28

## 2017-06-27 RX ORDER — MIRTAZAPINE 15 MG/1
15 TABLET, FILM COATED ORAL
Qty: 90 TAB | Refills: 0 | Status: SHIPPED | OUTPATIENT
Start: 2017-06-27

## 2017-06-27 RX ORDER — FUROSEMIDE 40 MG/1
TABLET ORAL
Qty: 60 TAB | Refills: 5 | Status: SHIPPED | OUTPATIENT
Start: 2017-06-27

## 2017-06-27 RX ORDER — FUROSEMIDE 40 MG/1
40 TABLET ORAL 2 TIMES DAILY
Status: DISCONTINUED | OUTPATIENT
Start: 2017-06-28 | End: 2017-07-19 | Stop reason: HOSPADM

## 2017-06-27 RX ORDER — IPRATROPIUM BROMIDE AND ALBUTEROL SULFATE 2.5; .5 MG/3ML; MG/3ML
3 SOLUTION RESPIRATORY (INHALATION)
Status: DISCONTINUED | OUTPATIENT
Start: 2017-06-27 | End: 2017-07-19 | Stop reason: HOSPADM

## 2017-06-27 RX ADMIN — FUROSEMIDE 40 MG: 40 TABLET ORAL at 09:17

## 2017-06-27 RX ADMIN — AMOXICILLIN AND CLAVULANATE POTASSIUM 1 TABLET: 500; 125 TABLET, FILM COATED ORAL at 09:17

## 2017-06-27 RX ADMIN — ARFORMOTEROL TARTRATE 15 MCG: 15 SOLUTION RESPIRATORY (INHALATION) at 07:31

## 2017-06-27 RX ADMIN — IPRATROPIUM BROMIDE AND ALBUTEROL SULFATE 3 ML: .5; 3 SOLUTION RESPIRATORY (INHALATION) at 19:38

## 2017-06-27 RX ADMIN — DILTIAZEM HYDROCHLORIDE 180 MG: 180 CAPSULE, EXTENDED RELEASE ORAL at 09:17

## 2017-06-27 RX ADMIN — LACTULOSE 20 G: 20 SOLUTION ORAL at 09:17

## 2017-06-27 RX ADMIN — IPRATROPIUM BROMIDE AND ALBUTEROL SULFATE 3 ML: .5; 3 SOLUTION RESPIRATORY (INHALATION) at 01:43

## 2017-06-27 RX ADMIN — PREDNISONE 15 MG: 5 TABLET ORAL at 17:03

## 2017-06-27 RX ADMIN — LEVOTHYROXINE SODIUM 50 MCG: 50 TABLET ORAL at 09:16

## 2017-06-27 RX ADMIN — PREDNISONE 15 MG: 5 TABLET ORAL at 09:17

## 2017-06-27 RX ADMIN — ARFORMOTEROL TARTRATE 15 MCG: 15 SOLUTION RESPIRATORY (INHALATION) at 19:49

## 2017-06-27 RX ADMIN — BUDESONIDE 500 MCG: 0.5 INHALANT RESPIRATORY (INHALATION) at 19:38

## 2017-06-27 RX ADMIN — CLOPIDOGREL BISULFATE 75 MG: 75 TABLET ORAL at 09:17

## 2017-06-27 RX ADMIN — LACTULOSE 20 G: 20 SOLUTION ORAL at 17:03

## 2017-06-27 RX ADMIN — ASPIRIN 81 MG 81 MG: 81 TABLET ORAL at 09:16

## 2017-06-27 RX ADMIN — PANTOPRAZOLE SODIUM 40 MG: 40 TABLET, DELAYED RELEASE ORAL at 09:17

## 2017-06-27 RX ADMIN — HEPARIN SODIUM 5000 UNITS: 5000 INJECTION, SOLUTION INTRAVENOUS; SUBCUTANEOUS at 06:08

## 2017-06-27 RX ADMIN — IPRATROPIUM BROMIDE AND ALBUTEROL SULFATE 3 ML: .5; 3 SOLUTION RESPIRATORY (INHALATION) at 14:02

## 2017-06-27 RX ADMIN — DOCUSATE SODIUM 100 MG: 100 CAPSULE, LIQUID FILLED ORAL at 09:17

## 2017-06-27 RX ADMIN — BUDESONIDE 500 MCG: 0.5 INHALANT RESPIRATORY (INHALATION) at 07:31

## 2017-06-27 RX ADMIN — HEPARIN SODIUM 5000 UNITS: 5000 INJECTION, SOLUTION INTRAVENOUS; SUBCUTANEOUS at 17:03

## 2017-06-27 RX ADMIN — FINASTERIDE 5 MG: 5 TABLET, FILM COATED ORAL at 10:25

## 2017-06-27 RX ADMIN — MIRTAZAPINE 15 MG: 15 TABLET, FILM COATED ORAL at 22:38

## 2017-06-27 RX ADMIN — FUROSEMIDE 40 MG: 40 TABLET ORAL at 17:03

## 2017-06-27 RX ADMIN — IPRATROPIUM BROMIDE AND ALBUTEROL SULFATE 3 ML: .5; 3 SOLUTION RESPIRATORY (INHALATION) at 07:31

## 2017-06-27 NOTE — IP AVS SNAPSHOT
Kasey Redding 
 
 
 [address] [place] [address] Patient: Jovan Erickson MRN: [id number] YRS:[birth year] Current Discharge Medication List  
  
START taking these medications Dose & Instructions Dispensing Information Comments Morning Noon Evening Bedtime QUEtiapine 25 mg tablet Commonly known as:  SEROquel Your last dose was: Your next dose is:    
   
   
 Dose:  25 mg Take 1 Tab by mouth nightly. Quantity:  30 Tab Refills:  1 CONTINUE these medications which have CHANGED Dose & Instructions Dispensing Information Comments Morning Noon Evening Bedtime  
 predniSONE 2.5 mg tablet Commonly known as:  Jayme Ramirez What changed:   
- medication strength 
- how much to take - when to take this 
- additional instructions Your last dose was: Your next dose is:    
   
   
 Dose:  7.5 mg Take 3 Tabs by mouth daily (with breakfast). Quantity:  10 Tab Refills:  0 CONTINUE these medications which have NOT CHANGED Dose & Instructions Dispensing Information Comments Morning Noon Evening Bedtime  
 albuterol-ipratropium 2.5 mg-0.5 mg/3 ml Nebu Commonly known as:  Sierra Steff Your last dose was: Your next dose is:    
   
   
 Dose:  3 mL  
3 mL by Nebulization route every six (6) hours. Quantity:  30 Vial  
Refills:  0  
     
   
   
   
  
 aspirin 81 mg chewable tablet Your last dose was: Your next dose is:    
   
   
 Dose:  81 mg Take 1 Tab by mouth daily. Quantity:  30 Tab Refills:  0  
     
   
   
   
  
 dilTIAZem  mg ER capsule Commonly known as:  CARDIZEM CD Your last dose was: Your next dose is:    
   
   
 Dose:  180 mg Take 1 Cap by mouth daily. Quantity:  30 Cap Refills:  2  
     
   
   
   
  
 docusate sodium 100 mg capsule Commonly known as:  Eleazar Parody  
   
 Your last dose was: Your next dose is:    
   
   
 Dose:  100 mg Take 1 Cap by mouth daily for 90 days. Quantity:  60 Cap Refills:  0  
     
   
   
   
  
 finasteride 5 mg tablet Commonly known as:  PROSCAR Your last dose was: Your next dose is:    
   
   
 Dose:  5 mg Take 1 Tab by mouth daily. Quantity:  90 Tab Refills:  3  
     
   
   
   
  
 fluticasone-salmeterol 500-50 mcg/dose diskus inhaler Commonly known as:  ADVAIR DISKUS Your last dose was: Your next dose is:    
   
   
 Dose:  1 Puff Take 1 Puff by inhalation every twelve (12) hours. Quantity:  1 Inhaler Refills:  5  
     
   
   
   
  
 furosemide 40 mg tablet Commonly known as:  LASIX Your last dose was: Your next dose is:    
   
   
 1 BID Quantity:  60 Tab Refills:  5  
     
   
   
   
  
 lactulose 10 gram/15 mL solution Commonly known as:  Mari Garcia Your last dose was: Your next dose is:    
   
   
 Dose:  30 mL Take 30 mL by mouth two (2) times a day. Quantity:  2 Bottle Refills:  5  
     
   
   
   
  
 mirtazapine 15 mg tablet Commonly known as:  Machelle Rose Your last dose was: Your next dose is:    
   
   
 Dose:  15 mg Take 1 Tab by mouth nightly. Quantity:  90 Tab Refills:  0 PLAVIX 75 mg Tab Generic drug:  clopidogrel Your last dose was: Your next dose is:    
   
   
 Dose:  75 mg Take 75 mg by mouth daily. Indications: lot big toe Refills:  0  
     
   
   
   
  
 raNITIdine 150 mg tablet Commonly known as:  ZANTAC Your last dose was: Your next dose is:    
   
   
 Dose:  150 mg Take 150 mg by mouth two (2) times a day. Refills:  0  
     
   
   
   
  
 synthroid 50 mcg tablet Generic drug:  levothyroxine Your last dose was: Your next dose is:    
   
   
 Dose:  50 mcg Take 50 mcg by mouth Daily (before breakfast). Indications: HYPOTHYROIDISM Refills:  0  
     
   
   
   
  
 tamsulosin 0.4 mg capsule Commonly known as:  FLOMAX Your last dose was: Your next dose is:    
   
   
 Dose:  0.4 mg Take 1 Cap by mouth daily (after dinner). Quantity:  90 Cap Refills:  3 Where to Get Your Medications Information on where to get these meds will be given to you by the nurse or doctor. ! Ask your nurse or doctor about these medications  
  predniSONE 2.5 mg tablet QUEtiapine 25 mg tablet

## 2017-06-27 NOTE — DISCHARGE SUMMARY
[address] [facility] SUMMARY    Name:  Duy Bryant  MR#:  [id number]  :  1929  Account #:  [de-identified]  Date of Adm:  2017  Date of Discharge:  2017      DISCHARGE DIAGNOSES  1. Acute on chronic hypoxic respiratory failure. 2. Acute right lower lobe pneumonia. 3. Oxygen and steroid dependent chronic obstructive pulmonary  disease. 4. Moderately severe malnutrition. 5. Hyponatremia, dilutional, corrected. 6. Hypokalemia, corrected. 7. Acute kidney injury, resolved. PAST MEDICAL HISTORY    DISCHARGE MEDICATIONS  1. Colace 1 b.i.d.  2. Lactulose 30 mL twice a day. 3. Remeron 15 mg at bedtime. 4. Lasix 40 mg twice daily. 5. Prednisone 15 mg twice daily for 3 days, then 10 mg twice daily for 3  days, then 15 mg daily for 3 days, then 7.5 mg daily indefinitely. 6. Duoneb unit dosed every 6 hours as needed. 7. Aspirin 81 mg a day. 8. Cardizem  mg daily. 9. Proscar 5 mg a day. 10. Advair Diskus 500/50 one puff b.i.d.  11. Plavix 75 mg a day. 12. Zantac 150 mg twice a day. 13. Synthroid 50 mcg a day. 14. Flomax 0.4 mg daily. 15. O2 at 4-5 L per nasal cannula. DISPOSITION: Home with followup in our office. REASON FOR ADMISSION: A very pleasant 80-year-old white  gentleman with oxygen and steroid dependent COPD admitted with  acute hypoxic respiratory failure, acute right lower lobe pneumonia with  exacerbation of COPD. The patient has history of paroxysmal atrial  fibrillation, however, this remained under control. On admission, he  was hyponatremic and studies were consistent with dilutional  hyponatremia. This responded well to fluid restriction and has  remained stable since. The patient was treated aggressively with IV  steroids, antibiotics, and aggressive bronchodilators. DVT prophylaxis  was provided. The patient slowly improved, initially requiring high flow  oxygen at 40-50 L.  This has gradually been decreased to 6 L today,  close to his baseline. Today, he is comfortable. Has no significant  complaints and vital signs are stable. Lungs with decreased breath  sounds at the bases without significant wheezes. Heart is regular. Abdomen is soft. The patient developed constipation which has  responded to the above program. He had insomnia and Remeron was  added with good results. He has completed his antibiotic course and no  further antibiotics are necessary. He has been switched to p.o. steroids  with continued stability and those will be tapered down gradually to his  baseline dose of 7.5. The patient and his daughter have requested him to go home and no  rehab. He is a DNR. The patient has achieved maximum benefits of  hospital stay and will be discharged home to follow up in our office next  week.         MD MEERA Vidal / VIRIDIANA  D:  06/27/2017   09:04  T:  06/27/2017   15:46  Job #:  [id number]

## 2017-06-27 NOTE — DISCHARGE SUMMARY
[facility] SUMMARY    Name:  Tj Quan  MR#:  [id number]  :  1929  Account #:  [de-identified]  Date of Adm:  2017  Date of Transfer:  2017      DISCHARGE DIAGNOSES  1. Acute on chronic hypoxic respiratory failure. 2. Acute right lower lobe pneumonia. 3. Oxygen and steroid dependent chronic obstructive pulmonary  disease. 4. Moderately severe malnutrition. 5. Hyponatremia, dilutional, corrected. 6. Hypokalemia, corrected. 7. Acute kidney injury, resolved. PAST MEDICAL HISTORY    DISCHARGE MEDICATIONS  1. Colace 1 b.i.d.  2. Lactulose 30 mL twice a day. 3. Remeron 15 mg at bedtime. 4. Lasix 40 mg twice daily. 5. Prednisone 15 mg twice daily for 3 days, then 10 mg twice daily for 3  days, then 15 mg daily for 3 days, then 7.5 mg daily indefinitely. 6. Duoneb unit dosed every 6 hours as needed. 7. Aspirin 81 mg a day. 8. Cardizem  mg daily. 9. Proscar 5 mg a day. 10. Advair Diskus 500/50 one puff b.i.d.  11. Plavix 75 mg a day. 12. Zantac 150 mg twice a day. 13. Synthroid 50 mcg a day. 14. Flomax 0.4 mg daily. 15. O2 at 4-5 L per nasal cannula. DISPOSITION: Home with followup in our office. REASON FOR ADMISSION: A very pleasant 60-year-old white  gentleman with oxygen and steroid dependent COPD admitted with  acute hypoxic respiratory failure, acute right lower lobe pneumonia with  exacerbation of COPD. The patient has history of paroxysmal atrial  fibrillation, however, this remained under control. On admission, he  was hyponatremic and studies were consistent with dilutional  hyponatremia. This responded well to fluid restriction and has  remained stable since. The patient was treated aggressively with IV  steroids, antibiotics, and aggressive bronchodilators. DVT prophylaxis  was provided. The patient slowly improved, initially requiring high flow  oxygen at 40-50 L.  This has gradually been decreased to 6 L today,  close to his baseline. Today, he is comfortable. Has no significant  complaints and vital signs are stable. Lungs with decreased breath  sounds at the bases without significant wheezes. Heart is regular. Abdomen is soft. The patient developed constipation which has  responded to the above program. He had insomnia and Remeron was  added with good results. He has completed his antibiotic course and no  further antibiotics are necessary. He has been switched to p.o. steroids  with continued stability and those will be tapered down gradually to his  baseline dose of 7.5. The patient and his daughter have requested him to go home and no  rehab. He is a DNR. The patient has achieved maximum benefits of  hospital stay and will be discharged home to follow up in our office next  week.         MD MEERA Francisco / VIRIDIANA  D:  06/27/2017   09:04  T:  06/27/2017   15:46  Job #:  [id number]

## 2017-06-27 NOTE — IP AVS SNAPSHOT
[address] Patient: Kathy Petersen MRN: [id number] OOZ:[birth year] You are allergic to the following No active allergies Recent Documentation Height Weight BMI Smoking Status 1.702 m 73.9 kg 25.53 kg/m2 Former Smoker Emergency Contacts Name Discharge Info Relation Home Work Mobile [facility] WATONG DISCHARGE CAREGIVER [3] Child [2] [phone] [address] [facility] Vashti Wue  Child [2] [phone] About your hospitalization You were admitted on:  June 27, 2017 You last received care in the:  [facility] [address] [place] [address] You were discharged on:  July 19, 2017 Unit phone number:  [phone] Why you were hospitalized Your primary diagnosis was:  Not on File Providers Seen During Your Hospitalizations Provider Role Specialty Primary office phone Dayan Membreno MD Attending Provider Geriatric Medicine [phone] Your Primary Care Physician (PCP) Primary Care Physician Office Phone Office Fax Jorge Lisa [phone] Follow-up Information Follow up With Details Comments Contact Info Dayan Membreno MD  follow up on Mon 07/26/17 @ 2:45pm [address] [place], [place] [address]y [address] [facility] [place] [id number] 
[phone] Your Appointments Friday August 25, 2017  9:00 AM EDT  
ESTABLISHED PATIENT with Lizeth Almazan MD  
[facility] [place] [address]  
[phone] Current Discharge Medication List  
  
START taking these medications Dose & Instructions Dispensing Information Comments Morning Noon Evening Bedtime QUEtiapine 25 mg tablet Commonly known as:  SEROquel Your last dose was:     
   
Your next dose is:    
   
   
 Dose:  25 mg  
 Take 1 Tab by mouth nightly. Quantity:  30 Tab Refills:  1 CONTINUE these medications which have CHANGED Dose & Instructions Dispensing Information Comments Morning Noon Evening Bedtime  
 predniSONE 2.5 mg tablet Commonly known as:  Maxim Herzog What changed:   
- medication strength 
- how much to take - when to take this 
- additional instructions Your last dose was: Your next dose is:    
   
   
 Dose:  7.5 mg Take 3 Tabs by mouth daily (with breakfast). Quantity:  10 Tab Refills:  0 CONTINUE these medications which have NOT CHANGED Dose & Instructions Dispensing Information Comments Morning Noon Evening Bedtime  
 albuterol-ipratropium 2.5 mg-0.5 mg/3 ml Nebu Commonly known as:  Yvrose Showers Your last dose was: Your next dose is:    
   
   
 Dose:  3 mL  
3 mL by Nebulization route every six (6) hours. Quantity:  30 Vial  
Refills:  0  
     
   
   
   
  
 aspirin 81 mg chewable tablet Your last dose was: Your next dose is:    
   
   
 Dose:  81 mg Take 1 Tab by mouth daily. Quantity:  30 Tab Refills:  0  
     
   
   
   
  
 dilTIAZem  mg ER capsule Commonly known as:  CARDIZEM CD Your last dose was: Your next dose is:    
   
   
 Dose:  180 mg Take 1 Cap by mouth daily. Quantity:  30 Cap Refills:  2  
     
   
   
   
  
 docusate sodium 100 mg capsule Commonly known as:  Mary Carmen Ceja Your last dose was: Your next dose is:    
   
   
 Dose:  100 mg Take 1 Cap by mouth daily for 90 days. Quantity:  60 Cap Refills:  0  
     
   
   
   
  
 finasteride 5 mg tablet Commonly known as:  PROSCAR Your last dose was: Your next dose is:    
   
   
 Dose:  5 mg Take 1 Tab by mouth daily. Quantity:  90 Tab Refills:  3  
     
   
   
   
  
 fluticasone-salmeterol 500-50 mcg/dose diskus inhaler Commonly known as:  ADVAIR DISKUS Your last dose was: Your next dose is:    
   
   
 Dose:  1 Puff Take 1 Puff by inhalation every twelve (12) hours. Quantity:  1 Inhaler Refills:  5  
     
   
   
   
  
 furosemide 40 mg tablet Commonly known as:  LASIX Your last dose was: Your next dose is:    
   
   
 1 BID Quantity:  60 Tab Refills:  5  
     
   
   
   
  
 lactulose 10 gram/15 mL solution Commonly known as:  Renell Tamica Your last dose was: Your next dose is:    
   
   
 Dose:  30 mL Take 30 mL by mouth two (2) times a day. Quantity:  2 Bottle Refills:  5  
     
   
   
   
  
 mirtazapine 15 mg tablet Commonly known as:  Shade Pa Your last dose was: Your next dose is:    
   
   
 Dose:  15 mg Take 1 Tab by mouth nightly. Quantity:  90 Tab Refills:  0 PLAVIX 75 mg Tab Generic drug:  clopidogrel Your last dose was: Your next dose is:    
   
   
 Dose:  75 mg Take 75 mg by mouth daily. Indications: lot big toe Refills:  0  
     
   
   
   
  
 raNITIdine 150 mg tablet Commonly known as:  ZANTAC Your last dose was: Your next dose is:    
   
   
 Dose:  150 mg Take 150 mg by mouth two (2) times a day. Refills:  0  
     
   
   
   
  
 synthroid 50 mcg tablet Generic drug:  levothyroxine Your last dose was: Your next dose is:    
   
   
 Dose:  50 mcg Take 50 mcg by mouth Daily (before breakfast). Indications: HYPOTHYROIDISM Refills:  0  
     
   
   
   
  
 tamsulosin 0.4 mg capsule Commonly known as:  FLOMAX Your last dose was: Your next dose is:    
   
   
 Dose:  0.4 mg Take 1 Cap by mouth daily (after dinner). Quantity:  90 Cap Refills:  3 Where to Get Your Medications Information on where to get these meds will be given to you by the nurse or doctor. ! Ask your nurse or doctor about these medications  
  predniSONE 2.5 mg tablet QUEtiapine 25 mg tablet Discharge Instructions None Discharge Orders None Introducing [facility] SERVICES! Marli Rios introduces VideoPros patient portal. Now you can access parts of your medical record, email your doctor's office, and request medication refills online. 1. In your internet browser, go to [email or web address] 2. Click on the First Time User? Click Here link in the Sign In box. You will see the New Member Sign Up page. 3. Enter your VideoPros Access Code exactly as it appears below. You will not need to use this code after youve completed the sign-up process. If you do not sign up before the expiration date, you must request a new code. · VideoPros Access Code: [email or web address] Expires: 8/24/2017 11:37 AM 
 
4. Enter the last four digits of your Social Security Number (xxxx) and Date of Birth (mm/dd/yyyy) as indicated and click Submit. You will be taken to the next sign-up page. 5. Create a VideoPros ID. This will be your VideoPros login ID and cannot be changed, so think of one that is secure and easy to remember. 6. Create a VideoPros password. You can change your password at any time. 7. Enter your Password Reset Question and Answer. This can be used at a later time if you forget your password. 8. Enter your e-mail address. You will receive e-mail notification when new information is available in [address] 9. Click Sign Up. You can now view and download portions of your medical record. 10. Click the Download Summary menu link to download a portable copy of your medical information. If you have questions, please visit the Frequently Asked Questions section of the VideoPros website. Remember, VideoPros is NOT to be used for urgent needs. For medical emergencies, dial 911. Now available from your iPhone and Android! General Information Please provide this summary of care documentation to your next provider. Patient Signature:  ____________________________________________________________ Date:  ____________________________________________________________  
  
Vlad Shackle Provider Signature:  ____________________________________________________________ Date:  ____________________________________________________________

## 2017-06-27 NOTE — PROGRESS NOTES
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 States that's fine. Asked him about D/C summary, states he already dictated d/c summary this morning. Spoke with pt's nurse & made her aware of above. Spoke with transcription & they will get discharge summary transcribed in the next hour. Care Management Interventions  PCP Verified by CM:  Yes  Mode of Transport at Discharge: Self  Transition of Care Consult (CM Consult): Discharge Planning  Discharge Durable Medical Equipment: No  Physical Therapy Consult: Yes  Occupational Therapy Consult: No  Speech Therapy Consult: No  Current Support Network: Relative's Home (lives with daughter)  Confirm Follow Up Transport: Family  Plan discussed with Pt/Family/Caregiver: Yes  Discharge Location  Discharge Placement: Skilled nursing facility (TCC)

## 2017-06-27 NOTE — PROGRESS NOTES
8192: Pt awake, on 7 LPM Salter HFNC. SPO2 on 7 LPM was 98%, Reduced LPM to 6 LPM. Scheduled Brovanna, Pulmicort, and DuoNeb treatment given via aerosol mask. Diminished breath sounds heard. No adverse effects observed. 1405: Pt asleep but easily aroused. SPO2 97% on 6 LPM Salter HFNC. LPM reduced to 5 LPM. Diminished breath sounds heard. Pt given scheduled DuoNeb treatment via mask. No observed adverse effects.

## 2017-06-27 NOTE — PROGRESS NOTES
Call placed to both pt's daughters to inform of discharge, no answer, VM left. Received call from pt's nurse, states family now wants pt to go to TCC. Posted in e-discharge to Diana Segovia spoke with [facility] in admissions, she will review & let me know. Melissa CopeRN,ext. [phone].

## 2017-06-27 NOTE — ROUTINE PROCESS
0800 - assumed care of patient - sleeping, easily arousable. No complaints. 0915 - AM meds given - states not hungry yet for breakfast.    1000 - sitting up for breakfast - no complaints    1100 - Spa team at bedside for bath    1400 - daughters at bedside - state they want patient to go to rehab - unable to take him home if not ambulatory - case management notified.     1700 - patient to d/c to TCC this evening

## 2017-06-27 NOTE — IP AVS SNAPSHOT
Summary of Care Report The Summary of Care report has been created to help improve care coordination. Users with access to [facility] [employer] or [employer] [address] (Web-based application) may access additional patient information including the Discharge Summary. If you are not currently a [address] user and need more information, please call the number listed below in the [place] [address] section and ask to be connected with Medical Records. Facility Information Name Address Phone [facility] [address] [facility] [address] [phone] Patient Information Patient Name Sex  Anju Mueller ([id number]) Male 3/4/1929 Discharge Information Admitting Provider Service Area Unit Barney Tripathi MD / Enrique  Transitional  / [phone] Discharge Provider Discharge Date/Time Discharge Disposition Destination (none) 2017 (Pending) AHR (none) Patient Language Language ENGLISH [13] Hospital Problems as of 2017  Reviewed: 2017  8:41 AM by Barney Tripathi MD  
 None Non-Hospital Problems as of 2017  Reviewed: 2017  8:41 AM by Barney Tripathi MD  
  
  
  
 Class Noted - Resolved Last Modified Active Problems   COPD (chronic obstructive pulmonary disease) ([facility] [address]  2015 - Present 2015 by Barney Tripathi MD  
  Entered by Barney Tripathi MD  
  COPD exacerbation ([facility] [address]  2016 - Present 2017 by Fish Zamora MD  
  Entered by Barney Tripathi MD  
  A-fib [facility])  2016 - Present 2016 by Barney Tripathi MD  
  Entered by Barney Tripathi MD  
  Acquired hypothyroidism  2016 - Present 2016 by Barney Tripathi MD  
  Entered by Barney Tripathi MD  
  Fatty liver  2016 - Present 2016 by Barney Tripathi MD  
  Entered by Barney Tripathi MD  
 Obstruction of carotid artery  12/15/2010 - Present 6/22/2016 by Ladonna Mora Entered by Ladonna Mora Atherosclerosis of renal artery ([facility] [address]  12/15/2010 - Present 6/22/2016 by Ladonna Mora Entered by Ladonna Mora Enlarged prostate  8/24/2016 - Present 8/24/2016 by Michael Jacques MD  
  Entered by Michael Jacques MD  
  Hyponatremia  6/13/2017 - Present 6/13/2017 by Marium Salinas MD  
  Entered by Marium Salinas MD  
  Pneumonia  6/13/2017 - Present 6/13/2017 by Marium Salinas MD  
  Entered by Marium Salinas MD  
  Acute on chronic respiratory failure with hypoxemia ([facility] [address]  6/14/2017 - Present 6/14/2017 by Yue Carranza MD  
  Entered by Yue Carranza MD  
  Malnutrition of moderate degree Aman Roslyn: 60% to less than 75% of standard weight) ([facility] [address]  6/14/2017 - Present 6/14/2017 by Yue Carranza MD  
  Entered by Yue Carranza MD  
  CHELY (acute kidney injury) ([facility] [address]  6/14/2017 - Present 6/14/2017 by Yue Carranza MD  
  Entered by Yue Carranza MD  
  Hypokalemia  6/14/2017 - Present 6/14/2017 by Yue Carranza MD  
  Entered by Yue Carranza MD  
  
You are allergic to the following No active allergies Current Discharge Medication List  
  
START taking these medications Dose & Instructions Dispensing Information Comments QUEtiapine 25 mg tablet Commonly known as:  SEROquel Dose:  25 mg Take 1 Tab by mouth nightly. Quantity:  30 Tab Refills:  1 CONTINUE these medications which have CHANGED Dose & Instructions Dispensing Information Comments  
 predniSONE 2.5 mg tablet Commonly known as:  Hector Fast What changed:   
- medication strength 
- how much to take - when to take this 
- additional instructions Dose:  7.5 mg Take 3 Tabs by mouth daily (with breakfast). Quantity:  10 Tab Refills:  0 CONTINUE these medications which have NOT CHANGED Dose & Instructions Dispensing Information Comments  
 albuterol-ipratropium 2.5 mg-0.5 mg/3 ml Nebu Commonly known as:  [place] Chow Dose:  3 mL  
3 mL by Nebulization route every six (6) hours. Quantity:  30 Vial  
Refills:  0  
   
 aspirin 81 mg chewable tablet Dose:  81 mg Take 1 Tab by mouth daily. Quantity:  30 Tab Refills:  0  
   
 dilTIAZem  mg ER capsule Commonly known as:  CARDIZEM CD Dose:  180 mg Take 1 Cap by mouth daily. Quantity:  30 Cap Refills:  2  
   
 docusate sodium 100 mg capsule Commonly known as:  Rhina Smitha Dose:  100 mg Take 1 Cap by mouth daily for 90 days. Quantity:  60 Cap Refills:  0  
   
 finasteride 5 mg tablet Commonly known as:  PROSCAR Dose:  5 mg Take 1 Tab by mouth daily. Quantity:  90 Tab Refills:  3  
   
 fluticasone-salmeterol 500-50 mcg/dose diskus inhaler Commonly known as:  ADVAIR DISKUS Dose:  1 Puff Take 1 Puff by inhalation every twelve (12) hours. Quantity:  1 Inhaler Refills:  5  
   
 furosemide 40 mg tablet Commonly known as:  LASIX  
 1 BID Quantity:  60 Tab Refills:  5  
   
 lactulose 10 gram/15 mL solution Commonly known as:  Reyes Northern Dose:  30 mL Take 30 mL by mouth two (2) times a day. Quantity:  2 Bottle Refills:  5  
   
 mirtazapine 15 mg tablet Commonly known as:  Benesusanaa Ottoniel Dose:  15 mg Take 1 Tab by mouth nightly. Quantity:  90 Tab Refills:  0 PLAVIX 75 mg Tab Generic drug:  clopidogrel Dose:  75 mg Take 75 mg by mouth daily. Indications: lot big toe Refills:  0  
   
 raNITIdine 150 mg tablet Commonly known as:  ZANTAC Dose:  150 mg Take 150 mg by mouth two (2) times a day. Refills:  0  
   
 synthroid 50 mcg tablet Generic drug:  levothyroxine Dose:  50 mcg Take 50 mcg by mouth Daily (before breakfast). Indications: HYPOTHYROIDISM Refills:  0  
   
 tamsulosin 0.4 mg capsule Commonly known as:  FLOMAX  Dose:  0.4 mg  
 Take 1 Cap by mouth daily (after dinner). Quantity:  90 Cap Refills:  3 Current Immunizations Name Date Influenza Vaccine (Quad) PF 2/10/2016 Pneumococcal Vaccine (Unspecified Type) 1/29/2014 Follow-up Information Follow up With Details Comments Contact Eduin Nagy MD  follow up on Mon 07/26/17 @ 2:45pm [address] Internal Medicine  [facility] [phone] 
[phone] Discharge Instructions None Chart Review Routing History Recipient Method Report Sent By Stephen Nagy MD  
Fax: [phone] Phone: [phone] Fax IP Auto Routed Jensen Raines MD [[id number]] 12/28/2013  2:43 PM 12/28/2013 Guicho Nagy MD  
Fax: [phone] Phone: [phone] Fax IP Auto Routed Jensen Raines MD [[id number]] 12/28/2013  2:43 PM 12/28/2013 Guicho Nagy MD  
Fax: [phone] Phone: [phone] Fax IP Auto Routed Jensen Raines MD [[id number]] 4/4/2014  7:00 PM 04/04/2014 Shoshana Koyanagi, MD  
Phone: [phone] In [employer] IP Auto Routed Jensen Raines MD [[id number]] 4/4/2014  7:00 PM 04/04/2014 Guicho Nagy MD  
Fax: [phone] Phone: [phone] Fax IP Auto Routed Jensen Raines MD [[id number]] 4/18/2014 10:03 AM 04/18/2014 Guicho Nagy MD  
Fax: [phone] Phone: [phone] Fax IP Auto Routed Jensen Raines MD [[id number]] 6/30/2015  9:03 AM 06/30/2015 Ryann Gandara MD  
Fax: [phone] Phone: [phone] Fax IP Auto Routed Jensen Raines MD [[id number]] 6/30/2015  9:03 AM 06/30/2015 Guicho Nagy MD  
Fax: [phone] Phone: [phone] Fax IP Auto Routed Jensen Raines MD [[id number]] 7/6/2015  1:31 PM 07/06/2015 Guicho Nagy MD  
Fax: [phone] Phone: [phone] Fax IP Auto Routed Jensen Raines MD [[id number]] 7/9/2015  5:37 PM 07/09/2015 Guicho Nagy MD  
Fax: [phone] Phone: [phone] Fax IP Auto Routed Jensen Raines MD [[id number]] 7/9/2015  5:37 PM 07/09/2015 Saqib Lopez MD  
Fax: [phone] Phone: [phone] Fax IP Auto Routed Jensen Raines MD [[id number]] 7/24/2015 10:35 AM 07/24/2015 Saqib Lopez MD  
Fax: [phone] Phone: [phone] Fax IP Auto Routed Jensen Raines MD [[id number]] 2/2/2016  4:05 PM 02/02/2016 Saqib Lopez MD  
Fax: [phone] Phone: [phone] Fax IP Auto Routed Jensen Raines MD [[id number]] 2/11/2016  7:16 AM 02/11/2016

## 2017-06-28 PROCEDURE — 74011250637 HC RX REV CODE- 250/637: Performed by: INTERNAL MEDICINE

## 2017-06-28 PROCEDURE — 74011636637 HC RX REV CODE- 636/637: Performed by: INTERNAL MEDICINE

## 2017-06-28 PROCEDURE — 74011000250 HC RX REV CODE- 250: Performed by: INTERNAL MEDICINE

## 2017-06-28 RX ORDER — PREDNISONE 5 MG/1
7.5 TABLET ORAL
Status: DISCONTINUED | OUTPATIENT
Start: 2017-07-07 | End: 2017-07-19 | Stop reason: HOSPADM

## 2017-06-28 RX ORDER — RANITIDINE 150 MG/1
150 TABLET, FILM COATED ORAL 2 TIMES DAILY
Status: DISCONTINUED | OUTPATIENT
Start: 2017-06-28 | End: 2017-07-19 | Stop reason: HOSPADM

## 2017-06-28 RX ORDER — PREDNISONE 10 MG/1
10 TABLET ORAL 2 TIMES DAILY WITH MEALS
Status: COMPLETED | OUTPATIENT
Start: 2017-07-01 | End: 2017-07-03

## 2017-06-28 RX ORDER — LEVOTHYROXINE SODIUM 50 UG/1
50 TABLET ORAL
Status: DISCONTINUED | OUTPATIENT
Start: 2017-06-28 | End: 2017-07-19 | Stop reason: HOSPADM

## 2017-06-28 RX ADMIN — PREDNISONE 15 MG: 5 TABLET ORAL at 09:29

## 2017-06-28 RX ADMIN — DOCUSATE SODIUM 100 MG: 100 CAPSULE, LIQUID FILLED ORAL at 09:29

## 2017-06-28 RX ADMIN — ASPIRIN 81 MG 81 MG: 81 TABLET ORAL at 09:34

## 2017-06-28 RX ADMIN — FUROSEMIDE 40 MG: 40 TABLET ORAL at 16:33

## 2017-06-28 RX ADMIN — MIRTAZAPINE 15 MG: 15 TABLET, FILM COATED ORAL at 22:00

## 2017-06-28 RX ADMIN — BUDESONIDE 500 MCG: 0.5 INHALANT RESPIRATORY (INHALATION) at 09:20

## 2017-06-28 RX ADMIN — LACTULOSE 20 G: 10 SOLUTION ORAL at 18:00

## 2017-06-28 RX ADMIN — ARFORMOTEROL TARTRATE 15 MCG: 15 SOLUTION RESPIRATORY (INHALATION) at 20:35

## 2017-06-28 RX ADMIN — LACTULOSE 20 G: 10 SOLUTION ORAL at 09:29

## 2017-06-28 RX ADMIN — ARFORMOTEROL TARTRATE 15 MCG: 15 SOLUTION RESPIRATORY (INHALATION) at 09:20

## 2017-06-28 RX ADMIN — RANITIDINE 150 MG: 150 TABLET ORAL at 09:55

## 2017-06-28 RX ADMIN — DILTIAZEM HYDROCHLORIDE 180 MG: 180 CAPSULE, COATED, EXTENDED RELEASE ORAL at 09:29

## 2017-06-28 RX ADMIN — LEVOTHYROXINE SODIUM 50 MCG: 50 TABLET ORAL at 09:29

## 2017-06-28 RX ADMIN — FUROSEMIDE 40 MG: 40 TABLET ORAL at 09:29

## 2017-06-28 RX ADMIN — DOCUSATE SODIUM 100 MG: 100 CAPSULE, LIQUID FILLED ORAL at 18:00

## 2017-06-28 RX ADMIN — CLOPIDOGREL BISULFATE 75 MG: 75 TABLET, FILM COATED ORAL at 09:30

## 2017-06-28 RX ADMIN — TAMSULOSIN HYDROCHLORIDE 0.4 MG: 0.4 CAPSULE ORAL at 09:29

## 2017-06-28 RX ADMIN — BUDESONIDE 500 MCG: 0.5 INHALANT RESPIRATORY (INHALATION) at 20:35

## 2017-06-28 RX ADMIN — PREDNISONE 15 MG: 5 TABLET ORAL at 16:33

## 2017-06-28 RX ADMIN — RANITIDINE 150 MG: 150 TABLET ORAL at 18:00

## 2017-06-28 NOTE — ROUTINE PROCESS
Bedside and Verbal shift change report given to RitikaRN (oncoming nurse) by ZAYRA Menchaca LPN (offgoing nurse). Report included the following information SBAR, Kardex and MAR. Visual hourly rounds completed by nursing staff per facility protocol.

## 2017-06-28 NOTE — PROGRESS NOTES
I have reviewed this patient's current medication list and recent laboratory results. At this time, I do not suggest any drug therapy adjustments or additional laboratory monitoring. Thank you,  Alexandre RG  Ph. M. S.  6/28/2017

## 2017-06-28 NOTE — PROGRESS NOTES
**P&T Committee has authorized the Automatic Substitution of Advair Oral Inhaler to Budesonide and Arformoterol nebulizer solution. **  **If the use of an inhaler device is of medical necessity, please indicate Do Not Substitute or Dispense As Written below. **  **[facility] will administer all doses of nebulizer solution in this facility. **

## 2017-06-28 NOTE — PROGRESS NOTES
Nutrition initial assessment/Plan of care      RECOMMENDATIONS:   1. Cardiac, Mechanical Soft, 1500 ml Fluid restriction   2. Ensure Enlive daily  3. Monitor weight, labs and PO intake  4. RD to follow     GOALS:   1. PO intake meets >75% of protein/calorie needs by   7/5  2. Weight Maintenance (+/- 2#) by  7/5          ASSESSMENT:   Weight status is classified as normal per BMI of 25.4. PO intake is fair. Added Ensure Enlive daily for additional calories/protein. Labs reviewed. Nutrition recommendations listed. RD to follow. Nutrition Diagnoses:   Fluid imbalance related to hypokalemia & hponatremia as evidenced by    Na: 133    Nutrition Risk:  [] High  [] Moderate [x]  Low    SUBJECTIVE/OBJECTIVE:      Familiar with pt from 3-S. Pt no longer on a fluid restriction. Na is decreasing, (133) will monitor for possible need to restrict fluids again. Visited with pt, states appetite is good, some improvement since original admission to [facility]. He is self fed,  edentulous, left dentures at home does well with mechanical soft consistency. Skin very fragile in appearance, crepey & has large ecchymosis patches. COPD, with exerting additional calories for breathing. Information Obtained from:    [x] Chart Review   [x] Patient   [] Family/Caregiver   [] Nurse/Physician   [] Interdisciplinary Meeting/Rounds    Dx: COPD  Diet: Cardiac, mechanical soft  Medications: [x] Reviewed    Allergies: [x] Reviewed   No diagnosis found.   Past Medical History:   Diagnosis Date    BPH (benign prostatic hyperplasia)     [facility] obstructive [facility] ([facility] [address]     History of blood clots     Hypercholesterolemia     Hypertension     Hypertensive cardiovascular disease     Hypothyroidism     Infection and inflammatory reaction due to indwelling urinary catheter, subsequent encounter     Peripheral vascular disease ([facility] [address]     Pneumonia     Urinary retention     UTI (urinary tract infection)       Labs: Lab Results   Component Value Date/Time    Sodium 142 06/21/2017 06:34 AM    Potassium 3.7 06/21/2017 06:34 AM    Chloride 94 06/21/2017 06:34 AM    CO2 42 06/21/2017 06:34 AM    Anion gap 6 06/21/2017 06:34 AM    Glucose 161 06/21/2017 06:34 AM    BUN 69 06/21/2017 06:34 AM    Creatinine 1.83 06/21/2017 06:34 AM    Calcium 8.2 06/21/2017 06:34 AM    Magnesium 2.6 02/01/2016 02:10 PM    Phosphorus 2.5 02/22/2011 03:40 AM    Albumin 2.8 06/13/2017 12:20 PM     Anthropometrics: BMI (calculated): 25.4  Last 3 Recorded Weights in this Encounter    06/27/17 2211   Weight: 73.5 kg (162 lb)      Ht Readings from Last 1 Encounters:   06/27/17 5' 7\" (1.702 m)     No data found. IBW: 148lb %IBW: 109% UBW: 164 lb  [] Weight Loss [x] Weight Gain [] Weight Stable    Estimated Nutrition Needs: [x] MSJ  [] Other:    Calories: 1700  kcal Based on:   [x] Actual BW    Protein:   75 g Based on:   [x] Actual BW    Fluid:       1700 ml Based on:   [x] Actual BW         [x] No Cultural, Zoroastrian or ethnic dietary need identified.     [] Cultural, Zoroastrian and ethnic food preferences identified and addressed     Wt Status:  [] Normal (18.6 - 24.9) [] Underweight (<18.5) [x] Overweight (25 - 29.9) [] Mild Obesity (30 - 34.9)  [] Moderate Obesity (35 - 39.9) [] Morbid Obesity (40+)   [] Moderate Malnutrition [] Severe Malnutrition in the context of :     Nutrition Problems Identified:   [x] Suboptimal PO intake   [] Food Allergies  [x] Difficulty chewing/swallowing/poor dentition  [] Constipation/Diarrhea   [] Nausea/Vomiting   [] None  [] Other:     Plan:   [x] Therapeutic Diet  []  Obtained/adjusted food preferences/tolerances and/or snacks options   []  Supplements added   [] Occupational therapy following for feeding techniques  []  HS snack added   []  Modify diet texture   []  Modify diet for food allergies   []  Educate patient   []  Assist with menu selection   [x]  Monitor PO intake on meal rounds   []  Continue inpatient monitoring and intervention   []  Participated in discharge planning/Interdisciplinary rounds/Team meetings   []  Other:     Education Needs:   [] Not appropriate for teaching at this time due to:   [x] Identified and addressed    Nutrition Monitoring and Evaluation:  [x] Continue ongoing monitoring and intervention  [] Other    Yuko Mckenzie  Pager: [phone]

## 2017-06-28 NOTE — ROUTINE PROCESS
Bedside and verbal shift report given to ROBINSON Moffett LPN (oncoming nurse) by SUDHEER Cuevas LPN (off going nurse). REport included SBAR, MAR and Kardex. Hourly rounds complete.

## 2017-06-28 NOTE — ROUTINE PROCESS
Bedside and Verbal shift change report given to a. pomperi lpn (oncoming nurse) by jacques Moffett lpn (offgoing nurse). Report included the following information SBAR, Kardex and MAR.  Hourly rounds

## 2017-06-28 NOTE — PROGRESS NOTES
Problem: Mobility Impaired (Adult and Pediatric)  Goal: *Acute Goals and Plan of Care (Insert Text)  PHYSICAL THERAPY STG GOALS :  Initiated 6/28/2017 and to be accomplished within 1-2 Weeks    1. Patient will move from supine to sit and sit to supine and roll side to side in bed with stand by assistance. 2. Patient will transfer from bed to chair and chair to bed with minimal assistance using RW. 3. Patient will perform sit to stand with minimal assistance with Fair- balance and safety awareness. 4. Patient will ambulate with minimal assistance/contact guard assist for 10 feet with RW on level surfaces with 1 turns. 5. Patient will improve standardized test score for [place] Standing Balance Scale 1+. 6. Patient will demonstrate transfer from electric w/c <> car with minimal assistance. PHYSICAL THERAPY LTG GOALS :  Initiated 6/28/2017 and to be accomplished within 3-4 Weeks    1. Patient will move from supine to sit and sit to supine and roll side to side in bed with supervision/set-up. 2. Patient will transfer from bed to chair and chair to bed with contact guard assist using RW. 3. Patient will perform sit to stand with contact guard assist with Fair balance and safety awareness. 4. Patient will ambulate with contact guard assist for 20 feet with RW on level surfaces with 2 turns. 5. Patient will improve standardized test score for [place] Standing Balance Scale 2.   6. Patient will demonstrate transfer from electric w/c <> car with contact guard assistance. Physical Therapist: Ambreen Helms PT on 6/28/2017   [facility]   PHYSICAL THERAPY EVALUATION     Patient: Sterling De La Torre (70 y.o. male)                Start of Care Date: 6/28/2017   Referred by : Lauri Murphy MD                                Precautions: Fall           History:  Patient was admitted to [address] [place] [address] on 6/13/17 with a Dx of Pneumonia, COPD exacerbation.  Upon acute d/c, he was unsafe to return home and was transferred to [facility]. History of present problem reveals HIGH Complexity :3+ comorbidities / personal factors will impact the outcome/ POC . Past Medical history includes:   Past Medical History:   Diagnosis Date    BPH (benign prostatic hyperplasia)      [facility] disease) [facility]      History of blood clots      Hypercholesterolemia      Hypertension      Hypertensive cardiovascular disease      Hypothyroidism      Infection and inflammatory reaction due to indwelling urinary catheter, subsequent encounter      Peripheral vascular disease ([facility]      Pneumonia      Urinary retention      UTI (urinary tract infection)     No past surgical history on file. Cognitive Status:  Mental Status  Neurologic State: Alert  Orientation Level: Oriented to person  Cognition: Follows commands  Perception: Appears intact  Perseveration: No perseveration noted  Safety/Judgement: Fall prevention  Barriers to Learning/Limitations: yes;  altered mental status (i.e.Sedation, Confusion)  Compensate with: visual, verbal, tactile, kinesthetic cues/model  Prior Level of Function/Home Situation and Assitance recieved:  Home Situation  Home Environment: Private residence  # Steps to Enter: 3  Rails to Enter: Yes  Hand Rails : Bilateral  Wheelchair Ramp: Yes  One/Two Story Residence: One story  Living Alone: No  Support Systems: Child(eduardo), Family member(s)  Patient Expects to be Discharged to[de-identified] Private residence  Current DME Used/Available at Home: Elveria Archie, Wheelchair  Tub or Shower Type: Tub/Shower combination  Level of Assistance received at Home:   Prior to this current hospitalization, the patient required assistance with ADLs from [facility] [place]. Pt ambulated short distances within home using standard walker, had electric w/c from community.    Justification for Evaluation complexity:  Patient is referred to Skilled Physical Therapy services due a functional decline in strength, endurance, mobility, balance, gait and required an overall HIGH  complexity evaluation examination. Exam:HIGH Complexity : 4+ Standardized tests and measures addressing body structure, function, activity limitation and / or participation in recreation    Clinical Presentation: MEDIUM Complexity : Evolving with changing characteristics   Eval Complexity: History: HIGH Complexity :3+ comorbidities / personal factors will impact the outcome/ POC Exam:HIGH Complexity : 4+ Standardized tests and measures addressing body structure, function, activity limitation and / or participation in recreation  Presentation: MEDIUM Complexity : Evolving with changing characteristics    OBJECTIVE DATA SUMMARY:      Vital Signs:  Resting BP:  BP: 113/67  HR: Pulse (Heart Rate): 68    Pain:  Pain Screen  Pain Scale 1: Numeric (0 - 10)  Pain Intensity 1: 0  Patient Stated Pain Goal: 0  Gross Assessment:  Gross Assessment  AROM: Generally decreased, functional  PROM: Generally decreased, functional  Strength: Generally decreased, functional (BLEs grossly 4/5)  Coordination: Generally decreased, functional  Tone: Normal              Bed Mobility:  Bed Mobility  Rolling: Stand-by asssistance  Supine to Sit: Minimum assistance  Sit to Supine: Stand-by asssistance  Scooting: Stand-by asssistance  Balance:  Balance  Sitting: Impaired; With support  Sitting - Static: Fair (occasional)  Sitting - Dynamic: Poor (constant support) (+)  Standing: Impaired;Pull to stand; With support  Standing - Static: Poor  Transfers:  Sit to Stand: Moderate assistance;Assist x2  Gait:     Gait Description (WDL): Exceptions to WDL (currently unable)        With 0 turns.   Wheelchair Management:      Assessment:   Clinical Decision Making:High Complexity , using standardized patient assessment instrument and /or measurable assessement of functional outcome of [facility] Standing Balance Scale, score of 0.   0: Pt performs 25% or less of standing activity (Max assist) CN, 100% impaired. 1: Pt supports self with upper extremities but requires therapist assistance. Pt performs 25-50% of effort (Mod assist) CM, 80% to <100% impaired. 1+: Pt supports self with upper extremities but requires therapist assistance. Pt performs >50% effort. (Min assist). CL, 60% to <80% impaired. 2: Pt supports self independently with both upper extremities (walker, crutches, parallel bars). CL, 60% to <80% impaired. 2+: Pt support self independently with 1 upper extremity (cane, crutch, 1 parallel bar). CK, 40% to <60% impaired. 3: Pt stands without upper extremity support for up to 30 seconds. CK, 40% to <60% impaired. 3+: Pt stands without upper extremity support for 30 seconds or greater. CJ, 20% to <40% impaired. 4: Pt independently moves and returns center of gravity 1-2 inches in one plane. CJ, 20% to <40% impaired. 4+: Pt independently moves and returns center of gravity 1-2 inches in multiple planes. CI, 1% to <20% impaired. 5: Pt independently moves and returns center of gravity in all planes greater than 2 inches. CH, 0% impaired. Positioning needs/Additional Comments :  Pt presents alert and oriented to person. Bed mobility performed with min A, requiring extensive rest break after achieving EOB, heart rate of 127 bpm, sats on 5L of O2 at 88%, instructions provided for pursed lip breathing. Sit <> stand performed x 2 reps max A x 2 with pt unable to complete stand. Pt required extensive rest break with pursed lip breathing. Sit to supine performed with SOB noted afterward, sats at 87% and heart rate at 95 bpm. TE rendered to address circulation and included ankle pumps x 10 reps with SOB noted, sats at 88%, 92 bpm.  Poor endurance is evident throughout entire session. Therapist contacted dtr Elizabeth Rivera who patient lives with, gained improved understanding of PLOF and established goals accordingly.  Recommend skilled PT services to address deficits, restore function, and for safe return home. TREATMENT PLAN: Rehab Potential : Fair  Skilled Physical Therapy Services may include:   [x ]           Bed Mobility Training             [x ]    Neuromuscular Re-Education  [x ]           Transfer Training                   [x ]    Orthotic/Prosthetic Training  [ x]           Gait Training                          [x ]    Modalities  [x ]           Therapeutic Procedures        [x ]    Manual Therapy  [x ]           Therapeutic Activities            [x ]    Patient and Family Training/Education  [ ]           Other (comment):      Frequency/Duration: Patient will be followed by physical therapy for 1-2 times a day for a minimum of 3 days per week for 4 weeks to address goals. Discharge Recommendations: Home Health  Patient's expected Discharge Location:  [x ]Private Residence  [ ] [facility]PC/ILF  [ [facility] Axe  [ ]Other:       COMMUNICATION/EDUCATION:  Patient/Family Agreement with Treatment Plan :      [x ]         The PT evaluation/POC has been reviewed with PT assistant. [x ]         Fall prevention education was provided and the patient/caregiver indicated understanding. [x ]         Patient/family have participated as able in goal setting and plan of care and Patient/family agree to work toward stated goals and plan of care. [ ]         Patient is unable to participate in goal setting and plan of care. Therapist/SW will contact family/POA. Treatment session:  Overall Complexity: MEDIUM Evaluation:  32 mins./ Treatment:  43 mins.   Physical Therapist:   Eldon Mejía, PT       6/28/2017   Thank you for this referral.

## 2017-06-28 NOTE — PROGRESS NOTES
conducted an Initial consultation and Spiritual Assessment for Micheal Ortiz, who is a 80 y. o.,male. Patients Primary Language is: [place]. According to the patients EMR Moravian Affiliation is: Taoism. The reason the Patient came to the hospital is:   Patient Active Problem List    Diagnosis Date Noted    Acute on chronic respiratory failure with hypoxemia ([facility] 06/14/2017    Malnutrition of moderate degree (Almeta Haydee: 60% to less than 75% of standard weight) ([facility] 06/14/2017    [facility]) [facility] 06/14/2017    Hypokalemia 06/14/2017    Hyponatremia 06/13/2017    Pneumonia 06/13/2017    Enlarged prostate 08/24/2016    Fatty liver 02/05/2016    COPD exacerbation ([facility] 02/01/2016    A-fib ([facility] 02/01/2016    Acquired hypothyroidism 02/01/2016    COPD (chronic obstructive pulmonary disease) ([facility] 07/13/2015    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 Patient was pleasant, although his voice sounded a bit gruff. Explored issues of ab and belief. Patient confirmed his Congregation affiliation. Listened empathically to patient. He did not seem to have any spiritual concerns at this time. Provided information on what chaplains do and [id number] [facility]. Offered [employer], prayer and assurance of continued prayer on patient's behalf. Chart reviewed. The following outcomes were achieved:  Patient shared limited information about his medical narrative. It seemed like he does not know what brought him here. Patient expressed gratitude for the 's visit. Plan:  Chaplains will continue to follow and will provide pastoral care as needed or requested. The Rev.  [address] [facility], [address] [place] [address] [phone]

## 2017-06-29 PROCEDURE — 74011000250 HC RX REV CODE- 250: Performed by: INTERNAL MEDICINE

## 2017-06-29 PROCEDURE — 74011250637 HC RX REV CODE- 250/637: Performed by: INTERNAL MEDICINE

## 2017-06-29 PROCEDURE — 74011636637 HC RX REV CODE- 636/637: Performed by: INTERNAL MEDICINE

## 2017-06-29 RX ADMIN — LACTULOSE 20 G: 10 SOLUTION ORAL at 08:27

## 2017-06-29 RX ADMIN — CLOPIDOGREL BISULFATE 75 MG: 75 TABLET, FILM COATED ORAL at 08:29

## 2017-06-29 RX ADMIN — DILTIAZEM HYDROCHLORIDE 180 MG: 180 CAPSULE, COATED, EXTENDED RELEASE ORAL at 08:29

## 2017-06-29 RX ADMIN — DOCUSATE SODIUM 100 MG: 100 CAPSULE, LIQUID FILLED ORAL at 08:28

## 2017-06-29 RX ADMIN — PREDNISONE 15 MG: 5 TABLET ORAL at 08:28

## 2017-06-29 RX ADMIN — LEVOTHYROXINE SODIUM 50 MCG: 50 TABLET ORAL at 08:28

## 2017-06-29 RX ADMIN — ASPIRIN 81 MG 81 MG: 81 TABLET ORAL at 08:28

## 2017-06-29 RX ADMIN — BUDESONIDE 500 MCG: 0.5 INHALANT RESPIRATORY (INHALATION) at 08:24

## 2017-06-29 RX ADMIN — FUROSEMIDE 40 MG: 40 TABLET ORAL at 17:06

## 2017-06-29 RX ADMIN — RANITIDINE 150 MG: 150 TABLET ORAL at 08:28

## 2017-06-29 RX ADMIN — ARFORMOTEROL TARTRATE 15 MCG: 15 SOLUTION RESPIRATORY (INHALATION) at 08:24

## 2017-06-29 RX ADMIN — ARFORMOTEROL TARTRATE 15 MCG: 15 SOLUTION RESPIRATORY (INHALATION) at 20:36

## 2017-06-29 RX ADMIN — BUDESONIDE 500 MCG: 0.5 INHALANT RESPIRATORY (INHALATION) at 20:36

## 2017-06-29 RX ADMIN — FINASTERIDE 5 MG: 5 TABLET, FILM COATED ORAL at 08:28

## 2017-06-29 RX ADMIN — DOCUSATE SODIUM 100 MG: 100 CAPSULE, LIQUID FILLED ORAL at 17:06

## 2017-06-29 RX ADMIN — PREDNISONE 15 MG: 5 TABLET ORAL at 17:06

## 2017-06-29 RX ADMIN — MIRTAZAPINE 15 MG: 15 TABLET, FILM COATED ORAL at 21:08

## 2017-06-29 RX ADMIN — RANITIDINE 150 MG: 150 TABLET ORAL at 17:06

## 2017-06-29 RX ADMIN — FUROSEMIDE 40 MG: 40 TABLET ORAL at 08:29

## 2017-06-29 RX ADMIN — TAMSULOSIN HYDROCHLORIDE 0.4 MG: 0.4 CAPSULE ORAL at 08:29

## 2017-06-29 NOTE — PROGRESS NOTES
Problem: Mobility Impaired (Adult and Pediatric)  Goal: *Acute Goals and Plan of Care (Insert Text)  PHYSICAL THERAPY STG GOALS :  Initiated 6/28/2017 and to be accomplished within 1-2 Weeks    1. Patient will move from supine to sit and sit to supine and roll side to side in bed with stand by assistance. 2. Patient will transfer from bed to chair and chair to bed with minimal assistance using RW. 3. Patient will perform sit to stand with minimal assistance with Fair- balance and safety awareness. 4. Patient will ambulate with minimal assistance/contact guard assist for 10 feet with RW on level surfaces with 1 turns. 5. Patient will improve standardized test score for [place] Standing Balance Scale 1+. 6. Patient will demonstrate transfer from electric w/c <> car with minimal assistance. PHYSICAL THERAPY LTG GOALS :  Initiated 6/28/2017 and to be accomplished within 3-4 Weeks    1. Patient will move from supine to sit and sit to supine and roll side to side in bed with supervision/set-up. 2. Patient will transfer from bed to chair and chair to bed with contact guard assist using RW. 3. Patient will perform sit to stand with contact guard assist with Fair balance and safety awareness. 4. Patient will ambulate with contact guard assist for 20 feet with RW on level surfaces with 2 turns. 5. Patient will improve standardized test score for [place] Standing Balance Scale 2.   6. Patient will demonstrate transfer from electric w/c <> car with contact guard assistance.      Physical Therapist: Asha Alberto PT on 6/28/2017    [facility]   PHYSICAL THERAPY DAILY TREATMENT NOTE        Patient: Ramona Garces (89 y.o. male)               Date: 6/29/2017    Physician: Geetha Lewis MD  Primary Diagnosis: Pneumonia          Treatment Diagnosis  Treatment Diagnosis: muscle weakness  Treatment Diagnosis 2: difficulty in walking  Precautions: Fall  Vital Signs  Vital Signs  Temp: 97.2 °F (36.2 °C)  Temp Source: Oral  Pulse (Heart Rate): 62  Heart Rate Source: Monitor  Resp Rate: 18  O2 Sat (%): 93 %  Level of Consciousness: Alert  BP: 155/70  MAP (Calculated): 98  BP 1 Method: Automatic  BP 1 Location: Right arm  BP Patient Position: At rest  MEWS Score: 1     Cognitive Status:  Mental Status  Neurologic State: Alert; Appropriate for age  Orientation Level: Oriented to person  Pain  Pain Screen  Pain Scale 1: Numeric (0 - 10)  Pain Intensity 1: 0  Patient Stated Pain Goal: 0  Bed Mobility Training  Bed Mobility Training  Rolling: Stand-by asssistance  Supine to Sit: Stand-by asssistance  Balance  Sitting: Impaired  Sitting - Static: Fair (occasional)  Sitting - Dynamic: Poor (constant support) (+)  Standing: Impaired; With support  Standing - Static: Poor  Transfer Training  Transfer Training  Sit to Stand: Total assistance  Bed to Chair: Total assistance;Assist x2  Sit to Stand: Total assistance  Gait Training                       Therapeutic Exercise:    Pt presents in bed on 5L of O2. Bed mobility with SBA. Sit <> stand with total A, Bed to w/c transfer total A x 2. TE rendered to address strength and endurance and included: heel slides, LAQ, marching 10 reps x 2 sets with frequent rest breaks due to SOB. SpO2 varied from 86% to 92% during TE, instruction in pursed lip breathing to improve. Patient/Caregiver Education:   Pt /Caregiver Education on compliance with HEP to address circulation and endurance, including sitting in w/c more often to address endurance. ASSESSMENT:  Patient continues to benefit from Skilled PT services to improve strength, endurance, mobility. Progression toward goals:  [ ]      Improving appropriately and progressing toward goals  [x ]      Improving slowly and progressing toward goals  [ ]      Not making progress toward goals and plan of care will be adjusted      Treatment session: 30 minutes.   Therapist:   Gui Cali, PT,          6/29/2017

## 2017-06-29 NOTE — ROUTINE PROCESS
Bedside and Verbal shift change report given to pablo tuttle lpn (oncoming nurse) by jacques Moffett lpn (offgoing nurse). Report included the following information SBAR, Kardex and MAR.  Hourly rounds

## 2017-06-29 NOTE — H&P
[address]  ROUTINE H AND PS    Name:  Nicci Davis  MR#:  [id number]  :  1929  Account #:  [de-identified]  Date of Adm:  2017      ADMITTED TO [facility]    HISTORY: This is an 58-year-old white gentleman with severe oxygen  and steroid dependent COPD, initially admitted to the acute side with  right lower lobe pneumonia, acute respiratory failure due to  exacerbation of COPD. Patient had moderate severe malnutrition,  hyponatremia which was felt to be dilutional, and hypokalemia--  corrected. He had acute kidney injury, responded to conservative  treatment. After aggressive treatment and a prolonged stay he  eventually improved. Oxygen requirement gradually decreased from  high flow of 50 liters down to his baseline of 4 to 5 liters. The patient  was very deconditioned and felt to need continued monitoring and  rehab. PAST MEDICAL HISTORY  1. Severe oxygen and steroid dependent COPD. 2.  Chronic hypoxic and hypercapnic respiratory failure. 3.  Chronic diastolic heart failure. 4.  Fatty liver. 5.  Hypothyroidism. 6.  Benign prostatic hyperplasia. 7.  Urine retention. 8.  Paroxysmal atrial fibrillation. 9.  Hypertension. 10.  Chronic kidney disease. 11.  Steroid induced hyperglycemia. 12.  Peripheral vascular disease. 13.  Right upper lung nodule--stable. 14.  Status post lower extremity revascularization, lumbar  decompression and fusion, removal of skin cancer, in addition to  colonoscopy. ALLERGIES: NO KNOWN DRUG ALLERGIES. CURRENT MEDICATIONS  1. Flomax. 2.  Proscar. 3.  Prednisone taper. 4.  Zantac. 5.  Advair. 6.  DuoNeb. 7.  Cardizem. 8.  Synthroid. 9.  Plavix. 10.  Aspirin. 11.  Lasix. 12.  Tylenol. SOCIAL HISTORY: He has very supportive daughters. I believe one of  them lives with him. He is , former smoker, does not drink any  alcohol. HE IS A DNR. FAMILY HISTORY: Patient unable to provide.     REVIEW OF SYSTEMS: Patient is alert and awake, and denies chest  pain, denies nausea, vomiting, diarrhea. No abdominal pain. No  dysuria, pyuria, hematuria. No focal neurologic symptoms. The rest is  as per history of present illness or unremarkable. PHYSICAL EXAMINATION  GENERAL: Elderly, pleasant male in no acute distress. Mild dyspnea. VITAL SIGNS: His current vital signs: Temperature 97.4, pulse 90,  blood pressure 119/64, pulse oximetry 93% on 5 liters. HEENT: Normocephalic, atraumatic. NECK: No adenopathy or venous distention. LUNGS: Decreased breath sounds at the bases with few scattered  wheezes. HEART: Regular rate and rhythm. No rubs or gallops are appreciated. ABDOMEN: Soft, nontender. Not distended. EXTREMITIES: No cyanosis or edema. NEUROLOGIC: Exam nonfocal.    IMPRESSION  1. Deconditioning. 2.  Recent pneumonia. 3.  Chronic obstructive pulmonary disease. 4.  Chronic respiratory failure. 5.  Hypertension. 6.  Hypothyroidism. 7.  As per past medical history. PLAN: Continue current medication including nebulized treatment,  prednisone taper down to his baseline dose. DVT prophylaxis. Physical  and occupational therapy. Further management accordingly.         Wojciech Holland MD    NT / [facility]  D:  06/29/2017   16:05  T:  06/29/2017   17:25  Job #:  [id number]

## 2017-06-29 NOTE — PROGRESS NOTES
SW met with pt, daughter Margoth Kothari  and daughter Chad Guajardo to complete the social evaluation. Pt lives with his daughter Sherice Mendez and son-in-law and requested assistance with all ad's prior to admission. His daughter Essie Morales who is a retired nurse came over daily to assist with adl's. Pt only walked short distances in the home or  his scooter . Pt's goal is to regain his strength and be able to return home. Pt is family is supportive and willing to assist with d/c planning. SW will follow.

## 2017-06-30 PROCEDURE — 74011250637 HC RX REV CODE- 250/637: Performed by: INTERNAL MEDICINE

## 2017-06-30 PROCEDURE — 74011636637 HC RX REV CODE- 636/637: Performed by: INTERNAL MEDICINE

## 2017-06-30 PROCEDURE — 74011000250 HC RX REV CODE- 250: Performed by: INTERNAL MEDICINE

## 2017-06-30 RX ADMIN — RANITIDINE 150 MG: 150 TABLET ORAL at 17:42

## 2017-06-30 RX ADMIN — FUROSEMIDE 40 MG: 40 TABLET ORAL at 08:03

## 2017-06-30 RX ADMIN — DILTIAZEM HYDROCHLORIDE 180 MG: 180 CAPSULE, COATED, EXTENDED RELEASE ORAL at 08:04

## 2017-06-30 RX ADMIN — LACTULOSE 20 G: 10 SOLUTION ORAL at 08:03

## 2017-06-30 RX ADMIN — ASPIRIN 81 MG 81 MG: 81 TABLET ORAL at 08:03

## 2017-06-30 RX ADMIN — DOCUSATE SODIUM 100 MG: 100 CAPSULE, LIQUID FILLED ORAL at 17:42

## 2017-06-30 RX ADMIN — ARFORMOTEROL TARTRATE 15 MCG: 15 SOLUTION RESPIRATORY (INHALATION) at 08:01

## 2017-06-30 RX ADMIN — LACTULOSE 20 G: 10 SOLUTION ORAL at 17:43

## 2017-06-30 RX ADMIN — BUDESONIDE 500 MCG: 0.5 INHALANT RESPIRATORY (INHALATION) at 08:01

## 2017-06-30 RX ADMIN — PREDNISONE 15 MG: 5 TABLET ORAL at 17:43

## 2017-06-30 RX ADMIN — TAMSULOSIN HYDROCHLORIDE 0.4 MG: 0.4 CAPSULE ORAL at 08:03

## 2017-06-30 RX ADMIN — BUDESONIDE 500 MCG: 0.5 INHALANT RESPIRATORY (INHALATION) at 21:31

## 2017-06-30 RX ADMIN — MIRTAZAPINE 15 MG: 15 TABLET, FILM COATED ORAL at 21:31

## 2017-06-30 RX ADMIN — PREDNISONE 15 MG: 5 TABLET ORAL at 08:03

## 2017-06-30 RX ADMIN — ARFORMOTEROL TARTRATE 15 MCG: 15 SOLUTION RESPIRATORY (INHALATION) at 21:31

## 2017-06-30 RX ADMIN — FINASTERIDE 5 MG: 5 TABLET, FILM COATED ORAL at 08:03

## 2017-06-30 RX ADMIN — CLOPIDOGREL BISULFATE 75 MG: 75 TABLET, FILM COATED ORAL at 08:03

## 2017-06-30 RX ADMIN — RANITIDINE 150 MG: 150 TABLET ORAL at 08:03

## 2017-06-30 RX ADMIN — LEVOTHYROXINE SODIUM 50 MCG: 50 TABLET ORAL at 08:03

## 2017-06-30 RX ADMIN — FUROSEMIDE 40 MG: 40 TABLET ORAL at 17:43

## 2017-06-30 RX ADMIN — DOCUSATE SODIUM 100 MG: 100 CAPSULE, LIQUID FILLED ORAL at 08:03

## 2017-06-30 NOTE — PROGRESS NOTES
Problem: Mobility Impaired (Adult and Pediatric)  Goal: *Acute Goals and Plan of Care (Insert Text)  PHYSICAL THERAPY STG GOALS :  Initiated 6/28/2017 and to be accomplished within 1-2 Weeks    1. Patient will move from supine to sit and sit to supine and roll side to side in bed with stand by assistance. 2. Patient will transfer from bed to chair and chair to bed with minimal assistance using RW. 3. Patient will perform sit to stand with minimal assistance with Fair- balance and safety awareness. 4. Patient will ambulate with minimal assistance/contact guard assist for 10 feet with RW on level surfaces with 1 turns. 5. Patient will improve standardized test score for [place] Standing Balance Scale 1+. 6. Patient will demonstrate transfer from electric w/c <> car with minimal assistance. PHYSICAL THERAPY LTG GOALS :  Initiated 6/28/2017 and to be accomplished within 3-4 Weeks    1. Patient will move from supine to sit and sit to supine and roll side to side in bed with supervision/set-up. 2. Patient will transfer from bed to chair and chair to bed with contact guard assist using RW. 3. Patient will perform sit to stand with contact guard assist with Fair balance and safety awareness. 4. Patient will ambulate with contact guard assist for 20 feet with RW on level surfaces with 2 turns. 5. Patient will improve standardized test score for [place] Standing Balance Scale 2.   6. Patient will demonstrate transfer from electric w/c <> car with contact guard assistance.      Physical Therapist: Julianne Castanon, PT on 6/28/2017    [facility] C[facility] [place]   PHYSICAL THERAPY DAILY TREATMENT NOTE        Patient: Chiqui Wynn (50 y.o. male)               Date: 6/30/2017    Physician: Onel Rousseau MD  Primary Diagnosis: Pneumonia          Treatment Diagnosis  Treatment Diagnosis: muscle weakness  Treatment Diagnosis 2: difficulty in walking  Precautions: Fall  Vital Signs  Vital Signs  Pulse (Heart Rate): (!) 53  Heart Rate Source: Monitor  O2 Sat (%): 95 %  Level of Consciousness: Alert  BP: 145/80  MAP (Calculated): 102     Cognitive Status:  Mental Status  Neurologic State: Alert;Confused  Orientation Level: Oriented to person  Cognition: Poor safety awareness;Decreased attention/concentration; Follows commands  Pain  Pain Screen  Pain Scale 1: Numeric (0 - 10)  Pain Intensity 1: 0  Patient Stated Pain Goal: 0  Bed Mobility Training     Balance  Sitting: Impaired  Sitting - Static: Fair (occasional)  Sitting - Dynamic: Fair (occasional) (-)  Standing: Impaired; With support  Standing - Static: Poor  Transfer Training  Transfer Training  Sit to Stand: Maximum assistance;Assist x2  Bed to Chair: Maximum assistance;Assist x2  Sit to Stand: Maximum assistance;Assist x2  Gait Training                       Therapeutic Exercise:    Family present at beginning of session. Therapist updated on pt's current level of function and factors that limit faster progression, particularly poor endurance and strength. Family is very understanding and agrees time will be needed to return to PLOF TE rendered to address strength, endurance, and mobility and included: heel raises, toe raises, marching, LAQ, resisted hip add, resisted hip abd with orange t-band 20 reps x 2 sets. Patient/Caregiver Education:   Pt /Caregiver Education on safety and fall prevention to reduce fall risk. ASSESSMENT:  Patient continues to benefit from Skilled PT services to improve strength, endurance, mobility. Progression toward goals:  [ ]      Improving appropriately and progressing toward goals  [X]      Improving slowly and progressing toward goals  [ ]      Not making progress toward goals and plan of care will be adjusted      Treatment session: 35 minutes.   Therapist:   Aziza Rodriguez PT,          6/30/2017

## 2017-06-30 NOTE — ROUTINE PROCESS
Bedside and Verbal shift change report given to [facility] LPN (oncoming nurse) by Rita Landa RN (offgoing nurse). Report included the following information SBAR, Kardex and MAR. Pt visually check every hour by staff.

## 2017-07-01 PROCEDURE — 74011000250 HC RX REV CODE- 250: Performed by: INTERNAL MEDICINE

## 2017-07-01 PROCEDURE — 74011250637 HC RX REV CODE- 250/637: Performed by: INTERNAL MEDICINE

## 2017-07-01 PROCEDURE — 74011636637 HC RX REV CODE- 636/637: Performed by: INTERNAL MEDICINE

## 2017-07-01 RX ADMIN — ARFORMOTEROL TARTRATE 15 MCG: 15 SOLUTION RESPIRATORY (INHALATION) at 20:36

## 2017-07-01 RX ADMIN — PREDNISONE 10 MG: 10 TABLET ORAL at 09:49

## 2017-07-01 RX ADMIN — RANITIDINE 150 MG: 150 TABLET ORAL at 18:08

## 2017-07-01 RX ADMIN — RANITIDINE 150 MG: 150 TABLET ORAL at 09:49

## 2017-07-01 RX ADMIN — FINASTERIDE 5 MG: 5 TABLET, FILM COATED ORAL at 09:48

## 2017-07-01 RX ADMIN — MIRTAZAPINE 15 MG: 15 TABLET, FILM COATED ORAL at 21:16

## 2017-07-01 RX ADMIN — BUDESONIDE 500 MCG: 0.5 INHALANT RESPIRATORY (INHALATION) at 09:48

## 2017-07-01 RX ADMIN — FUROSEMIDE 40 MG: 40 TABLET ORAL at 09:48

## 2017-07-01 RX ADMIN — DOCUSATE SODIUM 100 MG: 100 CAPSULE, LIQUID FILLED ORAL at 09:50

## 2017-07-01 RX ADMIN — IPRATROPIUM BROMIDE AND ALBUTEROL SULFATE 3 ML: .5; 3 SOLUTION RESPIRATORY (INHALATION) at 23:24

## 2017-07-01 RX ADMIN — DILTIAZEM HYDROCHLORIDE 180 MG: 180 CAPSULE, COATED, EXTENDED RELEASE ORAL at 09:49

## 2017-07-01 RX ADMIN — ASPIRIN 81 MG 81 MG: 81 TABLET ORAL at 09:49

## 2017-07-01 RX ADMIN — BUDESONIDE 500 MCG: 0.5 INHALANT RESPIRATORY (INHALATION) at 20:36

## 2017-07-01 RX ADMIN — LEVOTHYROXINE SODIUM 50 MCG: 50 TABLET ORAL at 09:49

## 2017-07-01 RX ADMIN — PREDNISONE 10 MG: 10 TABLET ORAL at 18:07

## 2017-07-01 RX ADMIN — CLOPIDOGREL BISULFATE 75 MG: 75 TABLET, FILM COATED ORAL at 09:49

## 2017-07-01 RX ADMIN — FUROSEMIDE 40 MG: 40 TABLET ORAL at 18:07

## 2017-07-01 RX ADMIN — TAMSULOSIN HYDROCHLORIDE 0.4 MG: 0.4 CAPSULE ORAL at 09:49

## 2017-07-01 NOTE — ROUTINE PROCESS
Bedside and verbal shift report given to B. Georgeann Riedel, LPN (on coming nurse) by SUDHEER Delgadillo LPN (off going nurse). Report included SBAR, MAR and Kardex.

## 2017-07-01 NOTE — ROUTINE PROCESS
Bedside and Verbal shift change report given to jay jay huff (oncoming nurse) by jacques Moffett lpn (offgoing nurse). Report included the following information SBAR, Kardex and MAR.  Hourly rounds

## 2017-07-01 NOTE — PROGRESS NOTES
Problem: Self Care Deficits Care Plan (Adult)  Goal: *Acute Goals and Plan of Care (Insert Text)  OCCUPATIONAL THERAPY SHORT TERM GOALS   Initiated 7/1/2017 and to be accomplished within 2 Week(s)    1. Patient will perform Upper body ADLs with/without adaptive equipment with minimal assistance/contact guard assist.  2. Patient will perform Lower body ADLs with/without adaptive equipment with moderate assistance. 3. Patient will perform toileting task with moderate assistance with Fair safety to reduce falls risk. 4. Patient will perform functional transfers with [address] and maximal assistance. 5. Patient will perform standing static/dynamic balance activities for improved ADL/IADL function with moderate assistance and Fair balance and safety awarenes. 6. Patient will improve Barthel index scores to atleast 45/100 to improve functional mobility. OCCUPATIONAL THERAPY LONG TERM GOALS   Initiated 7/1/2017 and to be accomplished within 4 Week(s)    1. Patient will perform Upper body ADLs with/without adaptive equipment with supervision/set-up. 2. Patient will perform Lower body ADLs with/without adaptive equipment with minimal assistance/contact guard assist.  3. Patient will perform toileting task with minimal assistance/contact guard assist with Good safety to reduce falls risk. 4. Patient will perform functional transfers with Rolling Walker and moderate assistance and Fair+ balance and safety awareness. 5. Patient will perform standing static/dynamic activity for improved ADL/IADL function with minimal assistance/contact guard assist an and Fair balance and safety awareness. 6. Patient will improve Barthel index score to 55/100 to improve independence with mobility.     Therapist: MS ADRIAN Cazares/L 7/1/2017   Outcome: Progressing Towards Goal  [facility]   OCCUPATIONAL THERAPY EVALUATION        Patient: Diandra Jose (68 y.o. male)            Start of Care Date: 7/1/2017                         Onset Date:  7/1/2017  Referred by : Marie Franco MD                               Primary Diagnosis: Pneumonia      Treatment Diagnosis  Treatment Diagnosis: muscle weakness  Treatment Diagnosis 2: difficulty in walking     Precautions: Fall, Other (comment) (oxygen)  Eval Complexity: History: HIGH Complexity : Extensive review of history including physical, cognitive and psychosocial history . History of present problem reveals HIGH Complexity :3+ comorbidities / personal factors will impact the outcome/ POC . Past Medical history includes:   Past Medical History:   Diagnosis Date    BPH (benign prostatic hyperplasia)      COPD [facility]chronic obstructive pulmonary disease) ([facility]      History of blood clots      Hypercholesterolemia      Hypertension      Hypertensive cardiovascular disease      Hypothyroidism      Infection and inflammatory reaction due to indwelling urinary catheter, subsequent encounter      [facility]      Pneumonia      Urinary retention      UTI (urinary tract infection)     No past surgical history on file. Cognitive Status:  Mental Status  Neurologic State: Alert  Orientation Level: Oriented to person  Cognition: Appropriate for age attention/concentration; Follows commands  Perception: Appears intact  Perseveration: No perseveration noted  Safety/Judgement: Fall prevention      Barriers to Learning/Limitations: yes; Confusion  Compensate with: visual, verbal, tactile, kinesthetic cues/model     Justification for Evaluation complexity:   HIGH Complexity : Patient presents with comorbidities that affect occupational performance. Signifigant modification of tasks or assistance (eg, physical or verbal) with assessment (s) is necessary to enable patient to complete evaluation .   Patient is referred to [address] [place] [address] due to a functional decline in activity tolerance, participation in ADLs, and decreased functional mobility secondary to generalized weakness. Prior Level of Function/Home Situation:   Home Situation  Home Environment: Private residence  # Steps to Enter: 3  Rails to Enter: Yes  Hand Rails : Bilateral  Wheelchair Ramp: Yes  One/Two Story Residence: One story  Living Alone: No  Support Systems: Child(eduardo), Family member(s)  Patient Expects to be Discharged to[de-identified] Private residence  Current DME Used/Available at Home: Koleen Antonio, Wheelchair  Tub or Shower Type: Tub/Shower combination      Level of Assistance received at Home: Prior to this current hospitalization, the patient required assistance with most self care tasks and utilized walker PTA. OBJECTIVE DATA SUMMARY:      Vital Signs:  Resting BP:  BP: 144/88  HR: Pulse (Heart Rate): 94     Pain:  0/10     Auditory:  Auditory  Auditory Impairment: None      Examination:   HIGH Complexity : 5 or more performance deficits relating to physical, cognitive , or psychosocial skils that result in activity limitations and / or participation restrictions; Tone and Sensation:  Tone: Normal (BUEs)  Sensation:  (unable to accurately assess; inconsistent responses)        Visual Perceptual:  Vision  Acuity:  (unable to accurately assess)      Coordination:  Coordination  Fine Motor Skills-Upper: Right Intact; Left Intact  Gross Motor Skills-Upper: Right Intact; Left Intact  Coordination: Generally decreased, functional (BUEs)  Fine Motor Skills-Upper: Right Intact; Left Intact    Gross Motor Skills-Upper: Right Intact; Left Intact      Bed Mobility:  Bed Mobility  Supine to Sit: Moderate assistance;Assist x1  Sit to Supine: Minimum assistance;Assist x1     Transfers:  Functional Transfers  Sit to Stand: Maximum assistance; Additional time;Assist x1 (unable to achieve full standing position)  Toilet Transfer :  (not assessed secondary to poor standing balance)      Balance:  Balance  Sitting: Impaired  Sitting - Static: Good (unsupported) (fatigued quickly)  Sitting - Dynamic: Fair (occasional)  Standing: Impaired  Standing - Static: Poor (unable to achieve full standing position with max x1)  Standing - Dynamic :  (not assessed)      Gross Assesment:  Gross Assessment  AROM: Generally decreased, functional (BUEs: approx 3/4 shoudler flexion)  PROM: Generally decreased, functional (BUEs)  Strength: Generally decreased, functional (BUEs: 3+/5)  Coordination: Generally decreased, functional (BUEs)  Tone: Normal (BUEs)  Sensation:  (unable to accurately assess; inconsistent responses)      ADL self care:  Basic ADL  Feeding: Minimum assistance  Oral Facial Hygiene/Grooming: Minimum assistance  Bathing: Maximum assistance  Upper Body Dressing: Moderate assistance  Lower Body Dressing: Maximum assistance  Toileting: Maximum assistance (briefs)      ASSESSMENT:   A clinical decision making of HIGH  complexity was conducted, which includes an analysis of the Occupational profile, standardized assessments, data and treatment options.                                             THE BARTHEL INDEX      ACTIVITY    SCORE   FEEDING  0=unable  5=needs help cutting,spreading butter,etc., or modified diet  10= independent    5   BATHING  0=dependent  5=independent (or in shower    0   GROOMING  0=needs help  5=independent face/hair/teeth/shaving (implements provided)    5   DRESSING  0=dependent  5=needs help but can do about half unaided  10=independent(including buttons, zips,laces etc.)    5   BOWELS  0=incontinent  5=occasional accident  10=continent    5   BLADDER  0=incontinent, or catheterized and unable to manage alone  5=occasional accident  10=continent    5   TOILET USE  0=dependent  5=needs some help, but can do something alone  10=independent (on and off, dressing, wiping)    0   TRANSFER (BED TO CHAIR AND BACK)  0=unable, no sitting balance  5=major help(one or two people,physical), can sit  10=minor help(verbal or physical)  15=independent    5   MOBILITY (ON LEVEL SURFACES)  0=immobile or <50 yards  5=wheelchair independent,including corners,>50 yards  10=walkes with help of one person (verbal or physical) >50 yards  15=independent(but may use any aid; for example, stick) >50 yards    5   STAIRS  0=unable  5=needs help (verbal, physical, carrying aid)  10=independent    0              TOTAL:              35/100      Additional comments:  Pt sidelying on L side on arrival; sleeping, but wakes to voice and agreeable to therapy. Pt alert and oriented to self, pt's daughters at bedside providing PLOF and house environment information. Mod A to maneuver from side lying to sitting at EOB. Increased SOB noted after bed mobility; pt utilizing UB when taking breaths. Max encouragement for proper breathing strategies; O2 95% on 5L at EOB. After 1 min, pt requesting to return to side lying secondary to fatigue; mod encouragement to maintain upright sitting position. Decreased AROM/strength of BUEs. Per family, pt required assistance with ADLs PTA. Attempted to stand with max A x1, unable to achieve full standing position at this time. Pt/family educated on role of OT and POC during rehab stay; family agreeable to addressing activity tolerance during ADLs at EOB and then progressing towards activity tolerance with standing. Pt left side lying with tabs alarm on; family at bedside. Recommend continued therapy to maximize activity tolerance for increased participation in ADLs and functional mobility.       TREATMENT PLAN : Rehab Potential : Fair  Skilled Occupational Therapy Services may include:   [X] Self Care/Home Mgmt                    [ ]Cognitive Perceptual Re-training                              [X] Adaptive Equip Training                 [X]Therapeutic Exercise                  [ ]Sensory Integration                           [ ]Community Work Integration  [X]Therapeutic Activities                     [X]Other/modalities  [ Alycia Paradise         [ ] Wheelchair Mgmt/propulsion    [X]Patient/Family/Staff Instruction      :  [ ]Orthotics/Prosthetic Fitting & training      Frequency/Duration: Patient will be followed by Occupational therapy for 1-2 times a day for a minimum of 3 days per week for 4 weeks to address goals. .  Discharge Recommendations: Home Health  Patient expected Discharge Location: [X]Private Residence  [ ] [facility]  [ [facility]  [ ] [address]       COMMUNICATION/EDUCATION:  Patient/Family Agreement with Treatment Plan :      [X]       OT Evaluation/POC has been reviewed with the THAKUR. [X]        Fall prevention education was provided and the patient/caregiver indicated understanding  [X]        Patient/family have participated as able in goal setting and plan of care. Patient/family agree to work toward stated goals and plan of care. [ ]        Patient is unable to participate in goal setting and plan of care. Therapist will contact SW/POA. Treatment session:   Overall Complexity:HIGH  Evaluation:  13mins.      Occupational Therapist:   Kecia Kelsey 87 OTR/L        7/1/2017   Thank You for this referral.

## 2017-07-02 PROCEDURE — 74011250637 HC RX REV CODE- 250/637: Performed by: INTERNAL MEDICINE

## 2017-07-02 PROCEDURE — 74011636637 HC RX REV CODE- 636/637: Performed by: INTERNAL MEDICINE

## 2017-07-02 PROCEDURE — 74011000250 HC RX REV CODE- 250: Performed by: INTERNAL MEDICINE

## 2017-07-02 PROCEDURE — 77030029684 HC NEB SM VOL KT MONA -A

## 2017-07-02 RX ADMIN — MIRTAZAPINE 15 MG: 15 TABLET, FILM COATED ORAL at 21:16

## 2017-07-02 RX ADMIN — FUROSEMIDE 40 MG: 40 TABLET ORAL at 07:34

## 2017-07-02 RX ADMIN — PREDNISONE 10 MG: 10 TABLET ORAL at 07:34

## 2017-07-02 RX ADMIN — ARFORMOTEROL TARTRATE 15 MCG: 15 SOLUTION RESPIRATORY (INHALATION) at 20:11

## 2017-07-02 RX ADMIN — BUDESONIDE 500 MCG: 0.5 INHALANT RESPIRATORY (INHALATION) at 07:34

## 2017-07-02 RX ADMIN — TAMSULOSIN HYDROCHLORIDE 0.4 MG: 0.4 CAPSULE ORAL at 09:59

## 2017-07-02 RX ADMIN — LACTULOSE 20 G: 10 SOLUTION ORAL at 09:59

## 2017-07-02 RX ADMIN — LEVOTHYROXINE SODIUM 50 MCG: 50 TABLET ORAL at 07:34

## 2017-07-02 RX ADMIN — IPRATROPIUM BROMIDE AND ALBUTEROL SULFATE 3 ML: .5; 3 SOLUTION RESPIRATORY (INHALATION) at 17:49

## 2017-07-02 RX ADMIN — ARFORMOTEROL TARTRATE 15 MCG: 15 SOLUTION RESPIRATORY (INHALATION) at 07:34

## 2017-07-02 RX ADMIN — DILTIAZEM HYDROCHLORIDE 180 MG: 180 CAPSULE, COATED, EXTENDED RELEASE ORAL at 09:59

## 2017-07-02 RX ADMIN — BUDESONIDE 500 MCG: 0.5 INHALANT RESPIRATORY (INHALATION) at 20:11

## 2017-07-02 RX ADMIN — PREDNISONE 10 MG: 10 TABLET ORAL at 16:30

## 2017-07-02 RX ADMIN — FINASTERIDE 5 MG: 5 TABLET, FILM COATED ORAL at 09:59

## 2017-07-02 RX ADMIN — LACTULOSE 20 G: 10 SOLUTION ORAL at 17:49

## 2017-07-02 RX ADMIN — ASPIRIN 81 MG 81 MG: 81 TABLET ORAL at 09:59

## 2017-07-02 RX ADMIN — DOCUSATE SODIUM 100 MG: 100 CAPSULE, LIQUID FILLED ORAL at 17:49

## 2017-07-02 RX ADMIN — RANITIDINE 150 MG: 150 TABLET ORAL at 09:59

## 2017-07-02 RX ADMIN — IPRATROPIUM BROMIDE AND ALBUTEROL SULFATE 3 ML: .5; 3 SOLUTION RESPIRATORY (INHALATION) at 11:30

## 2017-07-02 RX ADMIN — DOCUSATE SODIUM 100 MG: 100 CAPSULE, LIQUID FILLED ORAL at 09:59

## 2017-07-02 RX ADMIN — RANITIDINE 150 MG: 150 TABLET ORAL at 17:49

## 2017-07-02 RX ADMIN — CLOPIDOGREL BISULFATE 75 MG: 75 TABLET, FILM COATED ORAL at 09:59

## 2017-07-02 RX ADMIN — FUROSEMIDE 40 MG: 40 TABLET ORAL at 16:30

## 2017-07-02 NOTE — ROUTINE PROCESS
Bedside and verbal shift report given to ROBINSON Moffett LPN (oncomng nurse) by SUDHEER Hackett LPN (off going nurse). Report included SBAR, MAR and Kardex.

## 2017-07-03 PROCEDURE — 74011000250 HC RX REV CODE- 250: Performed by: INTERNAL MEDICINE

## 2017-07-03 PROCEDURE — 74011250637 HC RX REV CODE- 250/637: Performed by: INTERNAL MEDICINE

## 2017-07-03 PROCEDURE — 74011636637 HC RX REV CODE- 636/637: Performed by: INTERNAL MEDICINE

## 2017-07-03 RX ADMIN — CLOPIDOGREL BISULFATE 75 MG: 75 TABLET, FILM COATED ORAL at 09:15

## 2017-07-03 RX ADMIN — LACTULOSE 20 G: 10 SOLUTION ORAL at 09:13

## 2017-07-03 RX ADMIN — ARFORMOTEROL TARTRATE 15 MCG: 15 SOLUTION RESPIRATORY (INHALATION) at 22:08

## 2017-07-03 RX ADMIN — BUDESONIDE 500 MCG: 0.5 INHALANT RESPIRATORY (INHALATION) at 22:08

## 2017-07-03 RX ADMIN — DOCUSATE SODIUM 100 MG: 100 CAPSULE, LIQUID FILLED ORAL at 18:22

## 2017-07-03 RX ADMIN — MIRTAZAPINE 15 MG: 15 TABLET, FILM COATED ORAL at 22:08

## 2017-07-03 RX ADMIN — PREDNISONE 10 MG: 10 TABLET ORAL at 18:23

## 2017-07-03 RX ADMIN — PREDNISONE 10 MG: 10 TABLET ORAL at 09:14

## 2017-07-03 RX ADMIN — TAMSULOSIN HYDROCHLORIDE 0.4 MG: 0.4 CAPSULE ORAL at 09:15

## 2017-07-03 RX ADMIN — DOCUSATE SODIUM 100 MG: 100 CAPSULE, LIQUID FILLED ORAL at 09:13

## 2017-07-03 RX ADMIN — FUROSEMIDE 40 MG: 40 TABLET ORAL at 18:23

## 2017-07-03 RX ADMIN — FUROSEMIDE 40 MG: 40 TABLET ORAL at 09:15

## 2017-07-03 RX ADMIN — IPRATROPIUM BROMIDE AND ALBUTEROL SULFATE 3 ML: .5; 3 SOLUTION RESPIRATORY (INHALATION) at 10:41

## 2017-07-03 RX ADMIN — RANITIDINE 150 MG: 150 TABLET ORAL at 18:23

## 2017-07-03 RX ADMIN — BUDESONIDE 500 MCG: 0.5 INHALANT RESPIRATORY (INHALATION) at 09:10

## 2017-07-03 RX ADMIN — ARFORMOTEROL TARTRATE 15 MCG: 15 SOLUTION RESPIRATORY (INHALATION) at 09:10

## 2017-07-03 RX ADMIN — RANITIDINE 150 MG: 150 TABLET ORAL at 09:14

## 2017-07-03 RX ADMIN — ASPIRIN 81 MG 81 MG: 81 TABLET ORAL at 09:14

## 2017-07-03 RX ADMIN — DILTIAZEM HYDROCHLORIDE 180 MG: 180 CAPSULE, COATED, EXTENDED RELEASE ORAL at 09:14

## 2017-07-03 RX ADMIN — LACTULOSE 20 G: 10 SOLUTION ORAL at 18:23

## 2017-07-03 RX ADMIN — LEVOTHYROXINE SODIUM 50 MCG: 50 TABLET ORAL at 09:15

## 2017-07-03 RX ADMIN — FINASTERIDE 5 MG: 5 TABLET, FILM COATED ORAL at 09:13

## 2017-07-03 NOTE — PROGRESS NOTES
Problem: Mobility Impaired (Adult and Pediatric)  Goal: *Acute Goals and Plan of Care (Insert Text)  PHYSICAL THERAPY STG GOALS :  Initiated 6/28/2017 and to be accomplished within 1-2 Weeks    1. Patient will move from supine to sit and sit to supine and roll side to side in bed with stand by assistance. 2. Patient will transfer from bed to chair and chair to bed with minimal assistance using RW. 3. Patient will perform sit to stand with minimal assistance with Fair- balance and safety awareness. 4. Patient will ambulate with minimal assistance/contact guard assist for 10 feet with RW on level surfaces with 1 turns. 5. Patient will improve standardized test score for [place] Standing Balance Scale 1+. 6. Patient will demonstrate transfer from electric w/c <> car with minimal assistance. PHYSICAL THERAPY LTG GOALS :  Initiated 6/28/2017 and to be accomplished within 3-4 Weeks    1. Patient will move from supine to sit and sit to supine and roll side to side in bed with supervision/set-up. 2. Patient will transfer from bed to chair and chair to bed with contact guard assist using RW. 3. Patient will perform sit to stand with contact guard assist with Fair balance and safety awareness. 4. Patient will ambulate with contact guard assist for 20 feet with RW on level surfaces with 2 turns. 5. Patient will improve standardized test score for [place] Standing Balance Scale 2.   6. Patient will demonstrate transfer from electric w/c <> car with contact guard assistance.      Physical Therapist: Asha Alberto PT on 6/28/2017    [facility] [place]   PHYSICAL THERAPY DAILY TREATMENT NOTE        Patient: Ramona Garces (81 y.o. male)               Date: 7/3/2017    Physician: Geetha Lewis MD  Primary Diagnosis: Pneumonia          Treatment Diagnosis  Treatment Diagnosis: muscle weakness  Treatment Diagnosis 2: difficulty in walking  Precautions: Fall, Other (comment) (oxygen)  Vital Signs  Vital Signs  Pulse (Heart Rate): 74  Heart Rate Source: Monitor  Resp Rate: 20  Level of Consciousness: Alert  BP: 144/68  MAP (Calculated): 93  BP 1 Method: Automatic  BP 1 Location: Left arm  BP Patient Position: At rest;Head of bed elevated (Comment degrees)     Cognitive Status:  Mental Status  Neurologic State: Alert;Confused  Orientation Level: Oriented to person  Cognition: Decreased attention/concentration; Follows commands  Pain  Bed Mobility Training  Balance  Transfer Training  Gait Training  Therapeutic Exercise: Co-treat with OT to maximize functional gains with bed mobility and balance activities. Pt presented sidelying in bed. Pt very confused and presented with noted bleeding from L side of head (bandaid applied). Therapist notified [facility] (Catherine Rowland). Pt with anxiety over SOB. Therapist educated pt on pursed lip breathing techniques. Pt able to follow cues and visual instruction for pursed lip breathing. Therapist assisted pt with repositioning in bed. PT able to move B LE's towards center of bed with verbal cues. Sp02 with movement at 86-87% on 5L of supplemental O2. Patient/Caregiver Education:   Pt /Caregiver Education on safety and fall prevention, pursed lip breathing techniques was provided to reduce risk off falls and maximize functional gains. ASSESSMENT:  Patient continues to benefit from Skilled PT services to improve bed mobility, transfers, balance and strength. Progression toward goals:  [ ]      Improving appropriately and progressing toward goals  [ ]      Improving slowly and progressing toward goals  [X]      Not making progress toward goals and plan of care will be adjusted      Treatment session: 15 minutes.   Therapist:   Celestine Vargas PTA,          7/3/2017

## 2017-07-03 NOTE — PROGRESS NOTES
Problem: Self Care Deficits Care Plan (Adult)  Goal: *Acute Goals and Plan of Care (Insert Text)  OCCUPATIONAL THERAPY SHORT TERM GOALS   Initiated 7/1/2017 and to be accomplished within 2 Week(s)    1. Patient will perform Upper body ADLs with/without adaptive equipment with minimal assistance/contact guard assist.  2. Patient will perform Lower body ADLs with/without adaptive equipment with moderate assistance. 3. Patient will perform toileting task with moderate assistance with Fair safety to reduce falls risk. 4. Patient will perform functional transfers with [place] and maximal assistance. 5. Patient will perform standing static/dynamic balance activities for improved ADL/IADL function with moderate assistance and Fair balance and safety awarenes. 6. Patient will improve Barthel index scores to atleast 45/100 to improve functional mobility. OCCUPATIONAL THERAPY LONG TERM GOALS   Initiated 7/1/2017 and to be accomplished within 4 Week(s)    1. Patient will perform Upper body ADLs with/without adaptive equipment with supervision/set-up. 2. Patient will perform Lower body ADLs with/without adaptive equipment with minimal assistance/contact guard assist.  3. Patient will perform toileting task with minimal assistance/contact guard assist with Good safety to reduce falls risk. 4. Patient will perform functional transfers with Rolling Walker and moderate assistance and Fair+ balance and safety awareness. 5. Patient will perform standing static/dynamic activity for improved ADL/IADL function with minimal assistance/contact guard assist an and Fair balance and safety awareness. 6. Patient will improve Barthel index score to 55/100 to improve independence with mobility.     Therapist: Susan Hui MS OTR/JAUN 7/1/2017   [facility]   OCCUPATIONAL THERAPY DAILY TREATMENT NOTE        Patient: Mynor Negro (51 y.o. male)                          Date: 7/3/2017  Attending Physician: Van Diaz MD  Primary Diagnosis: Pneumonia    Treatment Diagnosis  Treatment Diagnosis: muscle weakness  Treatment Diagnosis 2: difficulty in walking   Precautions : Precautions at Admission: Fall, Other (comment) (oxygen)  Vital Signs:  Vital Signs  Pulse (Heart Rate): 74  Heart Rate Source: Monitor  Resp Rate: 20  Level of Consciousness: Alert  BP: 144/68  MAP (Calculated): 93  BP 1 Method: Automatic  BP 1 Location: Left arm  BP Patient Position: At rest;Head of bed elevated (Comment degrees)        Therapeutic Activities:  Co-treated with PT for optimal functional gains with functional mobility and static standing balance needed for ADL tasks. Patient presents in bed, SOB and labored breathing. THAKUR and PTA noticed patient bleeding on R side of face. Notified patient's nurse Christina Peoples as well as assisted patient with clean up. Patient continued to demonstrated increased wheezing and labored breathing while in bed resting. O2 stats were assessed and range from 86-93% at rest. Educated patient as well as demonstrated on pursued lip breathing to assist with O2 stats.    Patient/Caregiver Education:    Pt. Santosh James Education on see above      ASSESSMENT:  Patient continues to demonstrate the need for skilled Occupational Therapy services to improve static standing balance needed for bathing  Progression toward goals:  [ ]      Improving appropriately and progressing toward goals  [X]      Improving slowly and progressing toward goals  [ ]      Not making progress toward goals and plan of care will be adjusted      Treatment session:   15 minutes     Therapist:    Jennifer Venegas, [address] ,  7/3/2017

## 2017-07-03 NOTE — ROUTINE PROCESS
Bedside and Verbal shift change report given to A Nikolai YOUNGN (oncoming nurse) by Oj Klein RN (offgoing nurse). Report included the following information SBAR, Kardex and MAR.

## 2017-07-03 NOTE — ROUTINE PROCESS
Nurse answered patients call bell. Nurse walked into resident's room, resident stated \" I starched myself. \" Nurse observed bright blood from area above left eye brown. Area was cleaned up and a mediplex applied to area. Skin tear approx. 0.3cm in size. Family notified.

## 2017-07-04 PROCEDURE — 74011636637 HC RX REV CODE- 636/637: Performed by: INTERNAL MEDICINE

## 2017-07-04 PROCEDURE — 74011000250 HC RX REV CODE- 250: Performed by: INTERNAL MEDICINE

## 2017-07-04 PROCEDURE — 74011250637 HC RX REV CODE- 250/637: Performed by: INTERNAL MEDICINE

## 2017-07-04 RX ADMIN — MIRTAZAPINE 15 MG: 15 TABLET, FILM COATED ORAL at 21:27

## 2017-07-04 RX ADMIN — LEVOTHYROXINE SODIUM 50 MCG: 50 TABLET ORAL at 08:19

## 2017-07-04 RX ADMIN — TAMSULOSIN HYDROCHLORIDE 0.4 MG: 0.4 CAPSULE ORAL at 08:19

## 2017-07-04 RX ADMIN — LACTULOSE 20 G: 10 SOLUTION ORAL at 08:19

## 2017-07-04 RX ADMIN — ARFORMOTEROL TARTRATE 15 MCG: 15 SOLUTION RESPIRATORY (INHALATION) at 21:26

## 2017-07-04 RX ADMIN — FUROSEMIDE 40 MG: 40 TABLET ORAL at 08:19

## 2017-07-04 RX ADMIN — RANITIDINE 150 MG: 150 TABLET ORAL at 08:19

## 2017-07-04 RX ADMIN — BUDESONIDE 500 MCG: 0.5 INHALANT RESPIRATORY (INHALATION) at 21:26

## 2017-07-04 RX ADMIN — DOCUSATE SODIUM 100 MG: 100 CAPSULE, LIQUID FILLED ORAL at 08:19

## 2017-07-04 RX ADMIN — FINASTERIDE 5 MG: 5 TABLET, FILM COATED ORAL at 08:19

## 2017-07-04 RX ADMIN — DOCUSATE SODIUM 100 MG: 100 CAPSULE, LIQUID FILLED ORAL at 17:52

## 2017-07-04 RX ADMIN — PREDNISONE 15 MG: 5 TABLET ORAL at 08:19

## 2017-07-04 RX ADMIN — ARFORMOTEROL TARTRATE 15 MCG: 15 SOLUTION RESPIRATORY (INHALATION) at 08:15

## 2017-07-04 RX ADMIN — ASPIRIN 81 MG 81 MG: 81 TABLET ORAL at 08:19

## 2017-07-04 RX ADMIN — DILTIAZEM HYDROCHLORIDE 180 MG: 180 CAPSULE, COATED, EXTENDED RELEASE ORAL at 08:19

## 2017-07-04 RX ADMIN — FUROSEMIDE 40 MG: 40 TABLET ORAL at 17:52

## 2017-07-04 RX ADMIN — BUDESONIDE 500 MCG: 0.5 INHALANT RESPIRATORY (INHALATION) at 08:15

## 2017-07-04 RX ADMIN — LACTULOSE 20 G: 10 SOLUTION ORAL at 17:52

## 2017-07-04 RX ADMIN — CLOPIDOGREL BISULFATE 75 MG: 75 TABLET, FILM COATED ORAL at 08:19

## 2017-07-04 RX ADMIN — RANITIDINE 150 MG: 150 TABLET ORAL at 17:52

## 2017-07-04 NOTE — PROGRESS NOTES
Problem: Mobility Impaired (Adult and Pediatric)  Goal: *Acute Goals and Plan of Care (Insert Text)  PHYSICAL THERAPY STG GOALS :  Initiated 6/28/2017 and to be accomplished within 1-2 Weeks (updated 7/4/17)    1. Patient will move from supine to sit and sit to supine and roll side to side in bed with stand by assistance. (Achieved)   2. Patient will transfer from bed to chair and chair to bed with minimal assistance using RW. (Progressing; mod A x 2)  3. Patient will perform sit to stand with minimal assistance with Fair- balance and safety awareness. (Progressing; mod A/max A x 2)  4. Patient will ambulate with minimal assistance/contact guard assist for 10 feet with RW on level surfaces with 1 turns. (Pt unable due to poor endurance)  5. Patient will improve standardized test score for [place] Standing Balance Scale 1+. (Not progressed; 0)  6. Patient will demonstrate transfer from electric w/c <> car with minimal assistance. (Not addressed)    PHYSICAL THERAPY LTG GOALS :  Initiated 6/28/2017 and to be accomplished within 3-4 Weeks    1. Patient will move from supine to sit and sit to supine and roll side to side in bed with supervision/set-up. 2. Patient will transfer from bed to chair and chair to bed with contact guard assist using RW. 3. Patient will perform sit to stand with contact guard assist with Fair balance and safety awareness. 4. Patient will ambulate with contact guard assist for 20 feet with RW on level surfaces with 2 turns. 5. Patient will improve standardized test score for [place] Standing Balance Scale 2.   6. Patient will demonstrate transfer from electric w/c <> car with contact guard assistance.      Physical Therapist: Gui Cali PT on 6/28/2017    [facility]   PHYSICAL THERAPY WEEKLY PROGRESS REPORT  Reporting Period:  Date:   6/28/17  to 7/4/17        Patient: Marybeth Chaparro (89 y.o. male)                         Date: 7/4/2017    Primary Diagnosis: Pneumonia                Attending Physician: Mickie Acosta MD   Treatment Diagnosis  Treatment Diagnosis: muscle weakness  Treatment Diagnosis 2: difficulty in walking  Precautions:  Fall, Other (comment) (oxygen)  Rehab Potential : Fair:     Skill interventions and education provided with clinical rationale (include individualized treatment techniques and standardized tests):   Skilled Physical Therapy services were provided with: TE rendered to address strength, endurance, and mobility. TA rendered to address bed mobility, sit <> stand transfers and bed <> w/c transfers. Neuromuscular re-education rendered to address balance impairments. No gait training as pt does not have strength, balance, or endurance to stand upright for even 5 seconds. Using a comparative statement, summarize significant progress toward goals as a result of skilled intervention provided:  Patient has made Poor+ progress towards their Physical Therapy goals in the areas of transfers, ambulation. Pt achieved 1/6 STGs. 2 of the 6 STGs have not been addressed due to poor endurance, strength, and balance. Identify remaining functional areas, impairments limiting progress and/or barriers to improvement:  Patient would benefit from continues PT services to address the following functional deficits in bed mobility, sit <> stand transfers, bed <> w/c transfers, balance. Barriers to improvement include: poor endurance/activity tolerance, weakness, impaired balance, weakness.         OBJECTIVE DATA SUMMARY:       INITIAL ASSESSMENT WEEKLY ASSESSMENT   COGNITIVE STATUS COGNITIVE STATUS   Neurologic State: Alert, Confused  Orientation Level: Oriented to person  Cognition: Follows commands  Perception: Appears intact  Perseveration: No perseveration noted  Safety/Judgement: Fall prevention Neurologic State: Alert, Appropriate for age  Orientation Level: Oriented to person  Cognition: Decreased attention/concentration  Perception: Appears intact  Perseveration: No perseveration noted  Safety/Judgement: Fall prevention   PAIN PAIN   Pain Scale 1: Numeric (0 - 10)  Pain Intensity 1: 0  Patient Stated Pain Goal: 0 Pain Scale 1: Numeric (0 - 10)  Pain Intensity 1: 0  Patient Stated Pain Goal: 0   GROSS ASSESSMENT GROSS ASSESSMENT   AROM: Generally decreased, functional  PROM: Generally decreased, functional  Strength: Generally decreased, functional (BLEs grossly 4/5)  Coordination: Generally decreased, functional  Tone: Normal  Sensation:  (unable to accurately assess; inconsistent responses) AROM: Generally decreased, functional (BUEs: approx 3/4 shoudler flexion)  PROM: Generally decreased, functional (BUEs)  Strength: Generally decreased, functional (BUEs: 3+/5)  Coordination: Generally decreased, functional (BUEs)  Tone: Normal (BUEs)  Sensation:  (unable to accurately assess; inconsistent responses)   BED MOBILITY BED MOBILITY   Rolling: Stand-by asssistance  Supine to Sit: Minimum assistance  Sit to Supine: Stand-by asssistance  Scooting: Stand-by asssistance Rolling: Stand-by asssistance  Supine to Sit: Minimum assistance (elevated HOB)  Sit to Supine: Minimum assistance, Assist x1  Scooting: Stand-by asssistance   GAIT GAIT         Exceptions to WDL (currently unable) Exceptions to WDL (currently unable)               TRANSFERS TRANSFERS   Sit to Stand:  Moderate assistance, Assist x2  Stand to Sit: Moderate assistance  Bed to Chair: Total assistance, Assist x2 Sit to Stand: Maximum assistance, Additional time, Assist x1 (unable to achieve full standing position)  Stand to Sit: Moderate assistance  Bed to Chair: Moderate assistance, Assist x2   BALANCE BALANCE   Sitting: Impaired, With support  Sitting - Static: Fair (occasional)  Sitting - Dynamic: Poor (constant support) (+)  Standing: Impaired, Pull to stand, With support  Standing - Static: Poor  Standing - Dynamic :  (not assessed) Sitting: With support  Sitting - Static: Fair (occasional)  Sitting - Dynamic: Fair (occasional)  Standing: With support  Standing - Static: Poor  Standing - Dynamic :  (not assessed)   WHEELCHAIR MOBILITY/MGMT WHEELCHAIR MOBILITY/MGMT         Activity Tolerance:  Poor Activity Tolerance: Poor   Visual/Perceptual   Acuity:  (unable to accurately assess)        Visual/Perceptual   Vision  Acuity:  (unable to accurately assess)         Auditory:   Auditory Impairment: Hard of hearing, bilateral      Auditory:   Auditory  Auditory Impairment: None         Steps: none   Clinical Decision makin [place] Standing Balance Scale Clinical Decision makin on [facility] Standing Balance      Treatment:  Patient demonstrates very slow and minimal progressions with skilled PT services, largely due to impaired endurance/activity tolerance on 5L of O2. Bed to w/c transfer with mod A x 2. TE rendered to address strength, endurance, and mobility and included: heel raises, toe raises, LAQ 20 reps x 1 set each with visual cueing for proper technique. Continue POC. Patient's response to treatment rendered:  Fair-     Patient expected Discharge Location:  [x ]Private Residence  [ ] half-way/ILF  [ [facility]  [ ]Other:     Plan: Continue Skilled PT services as established by the Plan of Care for 5-6 times a week. PT and Assistant have had a weekly case conference regarding the above treatment:  [X] Yes     [ ] No        Treatment session:  15 minutes. Therapist: Tera Mitchell PT       Date:2017  Forward to PT for co-signature when completed.

## 2017-07-05 PROCEDURE — 74011000250 HC RX REV CODE- 250: Performed by: INTERNAL MEDICINE

## 2017-07-05 PROCEDURE — 74011250637 HC RX REV CODE- 250/637: Performed by: INTERNAL MEDICINE

## 2017-07-05 PROCEDURE — 74011636637 HC RX REV CODE- 636/637: Performed by: INTERNAL MEDICINE

## 2017-07-05 RX ORDER — QUETIAPINE FUMARATE 25 MG/1
25 TABLET, FILM COATED ORAL
Status: DISCONTINUED | OUTPATIENT
Start: 2017-07-05 | End: 2017-07-19 | Stop reason: HOSPADM

## 2017-07-05 RX ADMIN — LEVOTHYROXINE SODIUM 50 MCG: 50 TABLET ORAL at 09:16

## 2017-07-05 RX ADMIN — RANITIDINE 150 MG: 150 TABLET ORAL at 09:15

## 2017-07-05 RX ADMIN — FUROSEMIDE 40 MG: 40 TABLET ORAL at 17:54

## 2017-07-05 RX ADMIN — DILTIAZEM HYDROCHLORIDE 180 MG: 180 CAPSULE, COATED, EXTENDED RELEASE ORAL at 09:16

## 2017-07-05 RX ADMIN — FINASTERIDE 5 MG: 5 TABLET, FILM COATED ORAL at 09:16

## 2017-07-05 RX ADMIN — ARFORMOTEROL TARTRATE 15 MCG: 15 SOLUTION RESPIRATORY (INHALATION) at 20:43

## 2017-07-05 RX ADMIN — FUROSEMIDE 40 MG: 40 TABLET ORAL at 09:16

## 2017-07-05 RX ADMIN — ARFORMOTEROL TARTRATE 15 MCG: 15 SOLUTION RESPIRATORY (INHALATION) at 13:22

## 2017-07-05 RX ADMIN — BUDESONIDE 500 MCG: 0.5 INHALANT RESPIRATORY (INHALATION) at 09:15

## 2017-07-05 RX ADMIN — MIRTAZAPINE 15 MG: 15 TABLET, FILM COATED ORAL at 21:35

## 2017-07-05 RX ADMIN — PREDNISONE 15 MG: 5 TABLET ORAL at 09:16

## 2017-07-05 RX ADMIN — DOCUSATE SODIUM 100 MG: 100 CAPSULE, LIQUID FILLED ORAL at 09:16

## 2017-07-05 RX ADMIN — TAMSULOSIN HYDROCHLORIDE 0.4 MG: 0.4 CAPSULE ORAL at 09:16

## 2017-07-05 RX ADMIN — RANITIDINE 150 MG: 150 TABLET ORAL at 17:54

## 2017-07-05 RX ADMIN — DOCUSATE SODIUM 100 MG: 100 CAPSULE, LIQUID FILLED ORAL at 17:54

## 2017-07-05 RX ADMIN — BUDESONIDE 500 MCG: 0.5 INHALANT RESPIRATORY (INHALATION) at 20:43

## 2017-07-05 RX ADMIN — ASPIRIN 81 MG 81 MG: 81 TABLET ORAL at 09:16

## 2017-07-05 RX ADMIN — QUETIAPINE FUMARATE 25 MG: 25 TABLET, FILM COATED ORAL at 23:02

## 2017-07-05 RX ADMIN — CLOPIDOGREL BISULFATE 75 MG: 75 TABLET, FILM COATED ORAL at 09:17

## 2017-07-05 NOTE — ROUTINE PROCESS
[facility] staff was assisting patient with feeding. Aid left room for a moment, then heard a load bang. Nurse and aid rushed to patients room, found the dinner tray flipped over onto the floor and a broken plate. Patient just looking at staff, not saying a word. Nursing staff cleaned up the mess and patient remained resting in bed. Call [facility] and personal belongings with in reach.

## 2017-07-05 NOTE — ROUTINE PROCESS
Bedside and Verbal shift change report given to ROBINSON Moffett LPN (oncoming nurse) by ZAYRA Menchaca LPN (offgoing nurse). Report included the following information SBAR, Kardex and MAR. Visial checks completed by nursing staff per faculty protocol.

## 2017-07-05 NOTE — PROGRESS NOTES
Problem: Self Care Deficits Care Plan (Adult)  Goal: *Acute Goals and Plan of Care (Insert Text)  OCCUPATIONAL THERAPY SHORT TERM GOALS   Initiated 7/1/2017 and to be accomplished within 2 Week(s)    1. Patient will perform Upper body ADLs with/without adaptive equipment with minimal assistance/contact guard assist.  2. Patient will perform Lower body ADLs with/without adaptive equipment with moderate assistance. 3. Patient will perform toileting task with moderate assistance with Fair safety to reduce falls risk. 4. Patient will perform functional transfers with [address] and maximal assistance. 5. Patient will perform standing static/dynamic balance activities for improved ADL/IADL function with moderate assistance and Fair balance and safety awarenes. 6. Patient will improve Barthel index scores to atleast 45/100 to improve functional mobility. OCCUPATIONAL THERAPY LONG TERM GOALS   Initiated 7/1/2017 and to be accomplished within 4 Week(s)    1. Patient will perform Upper body ADLs with/without adaptive equipment with supervision/set-up. 2. Patient will perform Lower body ADLs with/without adaptive equipment with minimal assistance/contact guard assist.  3. Patient will perform toileting task with minimal assistance/contact guard assist with Good safety to reduce falls risk. 4. Patient will perform functional transfers with Rolling Walker and moderate assistance and Fair+ balance and safety awareness. 5. Patient will perform standing static/dynamic activity for improved ADL/IADL function with minimal assistance/contact guard assist an and Fair balance and safety awareness. 6. Patient will improve Barthel index score to 55/100 to improve independence with mobility.     Therapist: Mayi Hoover MS OTR/JAUN 7/1/2017   [facility]   OCCUPATIONAL THERAPY DAILY TREATMENT NOTE        Patient: Susan Kapoor (88 y.o. male)                          Date: 7/5/2017  Attending Physician: Curtis Flores MD  Primary Diagnosis: Pneumonia    Treatment Diagnosis  Treatment Diagnosis: muscle weakness  Treatment Diagnosis 2: difficulty in walking   Precautions : Precautions at Admission: Fall, Other (comment) (oxygen)  Vital Signs:        Cognitive Status:     Pain:        Gross Assessment:     Coordination:     Bed Mobility:     Transfers:        Balance:  Balance  Sitting: With support  Sitting - Static: Fair (occasional)  Sitting - Dynamic: Fair (occasional)        Therapeutic Exercises:  UB strengthening with orange T Band, 2 sets x 10 reps in order to increase functional activity tolerance and UB muscle strength needed for ADLs. Patient required cueing for slow pacing and increased participation with exercises today due to reports of increased fatigue.   Patient/Caregiver Education:    Pt. Milagros Mckeon Education on see above        ASSESSMENT:  Patient continues to demonstrate the need for skilled Occupational Therapy services to improve static standing balance needed for toileting  Progression toward goals:  [X]      Improving appropriately and progressing toward goals  [ ]      Improving slowly and progressing toward goals  [ ]      Not making progress toward goals and plan of care will be adjusted      Treatment session:   33 minutes     Therapist:    Erika Nguyen [employer],  7/5/2017

## 2017-07-05 NOTE — PROGRESS NOTES
conducted a Follow up consultation and Spiritual Assessment for Ruben Chavez, who is a 80 y. o.,male. The  provided the following Interventions:  Continued the relationship of care and support. Patient was easily woken; he did not have any concerns at this time. He said that family have come to visit him, but not yet today. Patient received the Spiritual Health Kit.  offered [employer], prayer and assurance of continued prayer on patient's behalf. Chart reviewed. The following outcomes were achieved:  Patient expressed gratitude for pastoral care visit. Plan:  Chaplains will continue to follow and will provide pastoral care as needed or requested. The Rev.  [facility] Keagan Liao [address]  [facility] [phone] / [facility] [phone]

## 2017-07-05 NOTE — PROGRESS NOTES
I have reviewed this patient's current medication list and recent laboratory results. Please consider rechecking the BMP. The last one was two weeks ago, BID furosemide continues and serum K was low normal at 3.7. Thank you,  Marni RG  Ph. M. S.  7/5/2017

## 2017-07-05 NOTE — PROGRESS NOTES
Problem: Mobility Impaired (Adult and Pediatric)  Goal: *Acute Goals and Plan of Care (Insert Text)  PHYSICAL THERAPY STG GOALS :  Initiated 6/28/2017 and to be accomplished within 1-2 Weeks (updated 7/4/17)    1. Patient will move from supine to sit and sit to supine and roll side to side in bed with stand by assistance. (Achieved)   2. Patient will transfer from bed to chair and chair to bed with minimal assistance using RW. (Progressing; mod A x 2)  3. Patient will perform sit to stand with minimal assistance with Fair- balance and safety awareness. (Progressing; mod A/max A x 2)  4. Patient will ambulate with minimal assistance/contact guard assist for 10 feet with RW on level surfaces with 1 turns. (Pt unable due to poor endurance)  5. Patient will improve standardized test score for [place] Standing Balance Scale 1+. (Not progressed; 0)  6. Patient will demonstrate transfer from electric w/c <> car with minimal assistance. (Not addressed)    PHYSICAL THERAPY LTG GOALS :  Initiated 6/28/2017 and to be accomplished within 3-4 Weeks    1. Patient will move from supine to sit and sit to supine and roll side to side in bed with supervision/set-up. 2. Patient will transfer from bed to chair and chair to bed with contact guard assist using RW. 3. Patient will perform sit to stand with contact guard assist with Fair balance and safety awareness. 4. Patient will ambulate with contact guard assist for 20 feet with RW on level surfaces with 2 turns. 5. Patient will improve standardized test score for [place] Standing Balance Scale 2.   6. Patient will demonstrate transfer from electric w/c <> car with contact guard assistance.      Physical Therapist: Sabi Fong PT on 6/28/2017    [facility]   PHYSICAL THERAPY DAILY TREATMENT NOTE        Patient: Jennifer Sharma (66 y.o. male)               Date: 7/5/2017    Physician: Sim Menjivar MD  Primary Diagnosis: Pneumonia          Treatment Diagnosis  Treatment Diagnosis: muscle weakness  Treatment Diagnosis 2: difficulty in walking  Precautions: Fall, Other (comment) (oxygen)  Vital Signs  Cognitive Status:  Pain  Bed Mobility Training  Bed Mobility Training  Rolling: Stand-by asssistance  Supine to Sit: Contact guard assistance (with HOB elevated )  Scooting: Stand-by asssistance  Balance  Sitting: With support  Sitting - Static: Fair (occasional)  Sitting - Dynamic: Fair (occasional)  Standing: With support; Impaired  Standing - Static: Poor  Standing - Dynamic : None  Transfer Training  Transfer Training  Sit to Stand: Maximum assistance  Stand to Sit: Maximum assistance  Bed to Chair: Total assistance;Assist x1  Interventions: Safety awareness training;Verbal cues; Tactile cues;Manual cues  Sit to Stand: Maximum assistance  Gait Training  Therapeutic Exercise: Pt presented supine in bed. Pt alert and willing to participate in therapy session. Pt utilized 5 L of supplemental O2 throughout session. Bed mobility training as noted above. Pt able to supine>sit with CGA with HOB elevated. SPT from EOB>w/c with total A x1. Sit<>stand from w/c at [employer] x2 with Max A x1 and verbal cues for hand placement. Pt unable to maintain standing and would sit immediately upon standing. Sp02 with sit<>stand at 89%, pt instructed on pursed lip breathing techniques. Seated B HR/TR 2x10 with verbal and visual cues for proper technique and to stay on task. At end of session; pt remained sitting in w/c at bedside with chair alarm donned and call bell at side. Therapist informed CALE Campos) of pt sitting up OOB post therapy. Patient/Caregiver Education:   Pt /Caregiver Education on safety and fall prevention and pursed lip breathing techniques was provided to reduce risk of falls and maximize gains. ASSESSMENT:  Patient continues to benefit from Skilled PT services to improve bed mobility, transfers, strength and balance.    Progression toward goals:  [ ] Improving appropriately and progressing toward goals  [X]      Improving slowly and progressing toward goals  [ ]      Not making progress toward goals and plan of care will be adjusted      Treatment session: 35 minutes.   Therapist:   Melida Garcia PTA,          7/5/2017

## 2017-07-05 NOTE — ROUTINE PROCESS
Bedside and Verbal shift change report given to jay jay Samuel lpn (oncoming nurse) by jacques Moffett lpn (offgoing nurse). Report included the following information SBAR, Kardex and MAR. hourly rounds

## 2017-07-06 PROCEDURE — 74011000250 HC RX REV CODE- 250: Performed by: INTERNAL MEDICINE

## 2017-07-06 PROCEDURE — 74011636637 HC RX REV CODE- 636/637: Performed by: INTERNAL MEDICINE

## 2017-07-06 PROCEDURE — 74011250637 HC RX REV CODE- 250/637: Performed by: INTERNAL MEDICINE

## 2017-07-06 RX ADMIN — ARFORMOTEROL TARTRATE 15 MCG: 15 SOLUTION RESPIRATORY (INHALATION) at 22:43

## 2017-07-06 RX ADMIN — RANITIDINE 150 MG: 150 TABLET ORAL at 17:58

## 2017-07-06 RX ADMIN — QUETIAPINE FUMARATE 25 MG: 25 TABLET, FILM COATED ORAL at 22:44

## 2017-07-06 RX ADMIN — FUROSEMIDE 40 MG: 40 TABLET ORAL at 15:10

## 2017-07-06 RX ADMIN — CLOPIDOGREL BISULFATE 75 MG: 75 TABLET, FILM COATED ORAL at 08:58

## 2017-07-06 RX ADMIN — BUDESONIDE 500 MCG: 0.5 INHALANT RESPIRATORY (INHALATION) at 08:58

## 2017-07-06 RX ADMIN — DILTIAZEM HYDROCHLORIDE 180 MG: 180 CAPSULE, COATED, EXTENDED RELEASE ORAL at 08:58

## 2017-07-06 RX ADMIN — RANITIDINE 150 MG: 150 TABLET ORAL at 08:58

## 2017-07-06 RX ADMIN — DOCUSATE SODIUM 100 MG: 100 CAPSULE, LIQUID FILLED ORAL at 08:58

## 2017-07-06 RX ADMIN — ASPIRIN 81 MG 81 MG: 81 TABLET ORAL at 08:58

## 2017-07-06 RX ADMIN — IPRATROPIUM BROMIDE AND ALBUTEROL SULFATE 3 ML: .5; 3 SOLUTION RESPIRATORY (INHALATION) at 15:10

## 2017-07-06 RX ADMIN — LACTULOSE 20 G: 10 SOLUTION ORAL at 08:58

## 2017-07-06 RX ADMIN — TAMSULOSIN HYDROCHLORIDE 0.4 MG: 0.4 CAPSULE ORAL at 08:58

## 2017-07-06 RX ADMIN — PREDNISONE 15 MG: 5 TABLET ORAL at 08:58

## 2017-07-06 RX ADMIN — FUROSEMIDE 40 MG: 40 TABLET ORAL at 08:58

## 2017-07-06 RX ADMIN — LACTULOSE 20 G: 10 SOLUTION ORAL at 17:59

## 2017-07-06 RX ADMIN — LEVOTHYROXINE SODIUM 50 MCG: 50 TABLET ORAL at 08:58

## 2017-07-06 RX ADMIN — DOCUSATE SODIUM 100 MG: 100 CAPSULE, LIQUID FILLED ORAL at 17:59

## 2017-07-06 RX ADMIN — ARFORMOTEROL TARTRATE 15 MCG: 15 SOLUTION RESPIRATORY (INHALATION) at 08:58

## 2017-07-06 RX ADMIN — MIRTAZAPINE 15 MG: 15 TABLET, FILM COATED ORAL at 22:44

## 2017-07-06 RX ADMIN — FINASTERIDE 5 MG: 5 TABLET, FILM COATED ORAL at 08:58

## 2017-07-06 RX ADMIN — BUDESONIDE 500 MCG: 0.5 INHALANT RESPIRATORY (INHALATION) at 22:43

## 2017-07-06 NOTE — ROUTINE PROCESS
Provided breathing treatment due to s/s of SOB, using accessory muscles, o2 at 89%-90%. Reposition patient in bed with HOB elevated.  Will continue to monitor

## 2017-07-06 NOTE — ROUTINE PROCESS
Bedside and Verbal shift change report given to michelle Coleman lpn(oncoming nurse) by jacques Moffett lpn (offgoing nurse). Report included the following information SBAR, Kardex and MAR.  Hourly rounds

## 2017-07-06 NOTE — ROUTINE PROCESS
Bedside and Verbal shift change report given to HOA Murrieta (oncoming nurse) by LEONIE Love (offgoing nurse). Report included the following information SBAR, Kardex, MAR, Recent Results and Med Rec Status. Visually Checked on patient hourly.

## 2017-07-06 NOTE — PROGRESS NOTES
Problem: Mobility Impaired (Adult and Pediatric)  Goal: *Acute Goals and Plan of Care (Insert Text)  PHYSICAL THERAPY STG GOALS :  Initiated 6/28/2017 and to be accomplished within 1-2 Weeks (updated 7/4/17)    1. Patient will move from supine to sit and sit to supine and roll side to side in bed with stand by assistance. (Achieved)   2. Patient will transfer from bed to chair and chair to bed with minimal assistance using RW. (Progressing; mod A x 2)  3. Patient will perform sit to stand with minimal assistance with Fair- balance and safety awareness. (Progressing; mod A/max A x 2)  4. Patient will ambulate with minimal assistance/contact guard assist for 10 feet with RW on level surfaces with 1 turns. (Pt unable due to poor endurance)  5. Patient will improve standardized test score for [place] Standing Balance Scale 1+. (Not progressed; 0)  6. Patient will demonstrate transfer from electric w/c <> car with minimal assistance. (Not addressed)    PHYSICAL THERAPY LTG GOALS :  Initiated 6/28/2017 and to be accomplished within 3-4 Weeks    1. Patient will move from supine to sit and sit to supine and roll side to side in bed with supervision/set-up. 2. Patient will transfer from bed to chair and chair to bed with contact guard assist using RW. 3. Patient will perform sit to stand with contact guard assist with Fair balance and safety awareness. 4. Patient will ambulate with contact guard assist for 20 feet with RW on level surfaces with 2 turns. 5. Patient will improve standardized test score for [place] Standing Balance Scale 2.   6. Patient will demonstrate transfer from electric w/c <> car with contact guard assistance.      Physical Therapist: Roxy Boone PT on 6/28/2017    [facility]   PHYSICAL THERAPY DAILY TREATMENT NOTE        Patient: Bhavani Garcia (75 y.o. male)               Date: 7/6/2017    Physician: Cachorro Pandya MD  Primary Diagnosis: Pneumonia          Treatment Diagnosis  Treatment Diagnosis: muscle weakness  Treatment Diagnosis 2: difficulty in walking  Precautions: Fall, Other (comment) (oxygen)  Vital Signs  Vital Signs  Pulse (Heart Rate): 70  Heart Rate Source: Monitor  Resp Rate: 16  O2 Sat (%): 95 %  Level of Consciousness: Alert  BP: 134/62  MAP (Calculated): 86  BP 1 Method: Automatic  BP 1 Location: Right arm  BP Patient Position: At rest;Supine     Cognitive Status:  Mental Status  Neurologic State: Alert; Appropriate for age  Orientation Level: Oriented to person  Cognition: Decreased attention/concentration; Impulsive  Pain  Pain Screen  Pain Scale 1: Numeric (0 - 10)  Pain Intensity 1: 0  Patient Stated Pain Goal: 0  Bed Mobility Training  Bed Mobility Training  Supine to Sit: Contact guard assistance; Additional time  Scooting: Stand-by asssistance  Balance  Sitting: With support  Sitting - Static: Fair (occasional)  Sitting - Dynamic: Fair (occasional)  Standing: With support  Standing - Static: Poor  Standing - Dynamic : None  Transfer Training  Transfer Training  Sit to Stand: Maximum assistance  Stand to Sit: Maximum assistance  Stand Pivot Transfers: Maximum assistance  Bed to Chair: Maximum assistance  Interventions: Safety awareness training;Verbal cues; Tactile cues  Sit to Stand: Maximum assistance  Gait Training  Therapeutic Exercise: Pt presented supine in bed. Pt required some encouragement for participation and OOB therapy session. Supine>sit with SBA/CGA with HOB elevated and additional time and effort. Pt able to sit at EOB unsupported with no noted LOB. SPT from EOB>w/c with Max A. Post transfer, pt appeared SOB. Sp02 at 82%. Therapist instructed pt on pursed lip breathing technique. Sp02 returned to 91%. Pt utilized 5L of supplemental O2 throughout session. Seated B LE TE rendered to build strength,endurance and flexibility for improved functional mobility: HR/TR x20. LAQ, hip flexion, ball squeezes, resisted hip abd (yellow band) x10.  Therapist spoke with pt's daughters in regards to pt's progress with therapy. Therapist advised pt sit up OOB more and provided pt's daughters with HEP for B LE's that they can do with pt in room to assist with building strength. Patient/Caregiver Education:   Pt /Caregiver Education on safety and fall prevention, and importance of OOB was provided to reduce risk of falls and maximize gains. ASSESSMENT:  Patient continues to benefit from Skilled PT services to improve bed mobility, transfers, strength, balance, gait. Progression toward goals:  [ ]      Improving appropriately and progressing toward goals  [X]      Improving slowly and progressing toward goals  [X]      Not making progress toward goals and plan of care will be adjusted      Treatment session: 40 minutes.   Therapist:   Jorge L Naidu PTA,          7/6/2017

## 2017-07-06 NOTE — PROGRESS NOTES
Problem: Self Care Deficits Care Plan (Adult)  Goal: *Acute Goals and Plan of Care (Insert Text)  OCCUPATIONAL THERAPY SHORT TERM GOALS   Initiated 7/1/2017 and to be accomplished within 2 Week(s)    1. Patient will perform Upper body ADLs with/without adaptive equipment with minimal assistance/contact guard assist.  2. Patient will perform Lower body ADLs with/without adaptive equipment with moderate assistance. 3. Patient will perform toileting task with moderate assistance with Fair safety to reduce falls risk. 4. Patient will perform functional transfers with Cummings Mcmahon and maximal assistance. 5. Patient will perform standing static/dynamic balance activities for improved ADL/IADL function with moderate assistance and Fair balance and safety awarenes. 6. Patient will improve Barthel index scores to atleast 45/100 to improve functional mobility. OCCUPATIONAL THERAPY LONG TERM GOALS   Initiated 7/1/2017 and to be accomplished within 4 Week(s)    1. Patient will perform Upper body ADLs with/without adaptive equipment with supervision/set-up. 2. Patient will perform Lower body ADLs with/without adaptive equipment with minimal assistance/contact guard assist.  3. Patient will perform toileting task with minimal assistance/contact guard assist with Good safety to reduce falls risk. 4. Patient will perform functional transfers with Rolling Walker and moderate assistance and Fair+ balance and safety awareness. 5. Patient will perform standing static/dynamic activity for improved ADL/IADL function with minimal assistance/contact guard assist an and Fair balance and safety awareness. 6. Patient will improve Barthel index score to 55/100 to improve independence with mobility.     Therapist: Suzanne Yuan MS OTR/L 7/1/2017   [facility]   OCCUPATIONAL THERAPY DAILY TREATMENT NOTE        Patient: Mely Ruiz (29 y.o. male)                          Date: 7/6/2017  Attending Physician: Aileen Jimenez MD  Primary Diagnosis: Pneumonia    Treatment Diagnosis  Treatment Diagnosis: muscle weakness  Treatment Diagnosis 2: difficulty in walking   Precautions : Precautions at Admission: Fall, Other (comment) (oxygen)  Vital Signs:  Vital Signs  Pulse (Heart Rate): 70  Heart Rate Source: Monitor  Resp Rate: 16  O2 Sat (%): 95 %  Level of Consciousness: Alert  BP: 134/62  MAP (Calculated): 86  BP 1 Method: Automatic  BP 1 Location: Right arm  BP Patient Position: At rest;Supine     Cognitive Status:  Mental Status  Neurologic State: Alert; Appropriate for age  Orientation Level: Oriented to person  Cognition: Decreased attention/concentration; Impulsive  Pain:  Pain Screen  Pain Scale 1: Numeric (0 - 10)  Pain Intensity 1: 0  Patient Stated Pain Goal: 0  Pain Scale 1: Numeric (0 - 10)  Gross Assessment:     Coordination:     Bed Mobility:  Bed Mobility  Supine to Sit: Stand-by asssistance  Transfers:  Functional Transfers  Bed to Chair: Moderate assistance;Assist x2 (utilizing SPT )     Balance:  Balance  Sitting: With support  Sitting - Static: Fair (occasional)  Sitting - Dynamic: Fair (occasional)  Standing: With support  Standing - Static: Poor  ADL Self Care:     ADL Intervention:                 Lower Body Dressing Assistance  Dressing Assistance: Maximum assistance  Position Performed: Supine        Therapeutic Activities: Assisted patient with bed mobility, LB dressing and bed <>w/c transfers in order to assess safety and independence during task. See above for levels of A needed. Discontinued remainder of treatment until patient finishes breakfast.  Therapeutic Exercises:   Attempted UB restorator x 5 mins in order to increase functional activity and UB muscle strength needed for ADLS. Patient was able to tolerate 1 min prior to stating \"I'm tired and leave me alone. \" Assisted patient back to room with call bell within room as well as notified nursing staff.   Patient/Caregiver Education:    Pt. Rogelio Boudreaux Education on importance of participating with skilled therapy was provided for optimal strengthening upon return home.         ASSESSMENT:  Patient continues to demonstrate the need for skilled Occupational Therapy services to improve static standing balance needed for bathing  Progression toward goals:  [ ]      Improving appropriately and progressing toward goals  [X]      Improving slowly and progressing toward goals  [ ]      Not making progress toward goals and plan of care will be adjusted      Treatment session:  33 minutes     Therapist:    OFE Rodgers,  7/6/2017

## 2017-07-07 PROCEDURE — 74011250637 HC RX REV CODE- 250/637: Performed by: INTERNAL MEDICINE

## 2017-07-07 PROCEDURE — 74011636637 HC RX REV CODE- 636/637: Performed by: INTERNAL MEDICINE

## 2017-07-07 PROCEDURE — 74011000250 HC RX REV CODE- 250: Performed by: INTERNAL MEDICINE

## 2017-07-07 RX ADMIN — DILTIAZEM HYDROCHLORIDE 180 MG: 180 CAPSULE, COATED, EXTENDED RELEASE ORAL at 09:57

## 2017-07-07 RX ADMIN — FUROSEMIDE 40 MG: 40 TABLET ORAL at 17:14

## 2017-07-07 RX ADMIN — LEVOTHYROXINE SODIUM 50 MCG: 50 TABLET ORAL at 07:30

## 2017-07-07 RX ADMIN — LACTULOSE 20 G: 10 SOLUTION ORAL at 09:56

## 2017-07-07 RX ADMIN — BUDESONIDE 500 MCG: 0.5 INHALANT RESPIRATORY (INHALATION) at 20:49

## 2017-07-07 RX ADMIN — ASPIRIN 81 MG 81 MG: 81 TABLET ORAL at 09:57

## 2017-07-07 RX ADMIN — MIRTAZAPINE 15 MG: 15 TABLET, FILM COATED ORAL at 21:05

## 2017-07-07 RX ADMIN — RANITIDINE 150 MG: 150 TABLET ORAL at 17:14

## 2017-07-07 RX ADMIN — CLOPIDOGREL BISULFATE 75 MG: 75 TABLET, FILM COATED ORAL at 09:56

## 2017-07-07 RX ADMIN — FINASTERIDE 5 MG: 5 TABLET, FILM COATED ORAL at 09:57

## 2017-07-07 RX ADMIN — ARFORMOTEROL TARTRATE 15 MCG: 15 SOLUTION RESPIRATORY (INHALATION) at 20:49

## 2017-07-07 RX ADMIN — PREDNISONE 7.5 MG: 5 TABLET ORAL at 09:56

## 2017-07-07 RX ADMIN — LACTULOSE 20 G: 10 SOLUTION ORAL at 17:14

## 2017-07-07 RX ADMIN — DOCUSATE SODIUM 100 MG: 100 CAPSULE, LIQUID FILLED ORAL at 17:14

## 2017-07-07 RX ADMIN — ARFORMOTEROL TARTRATE 15 MCG: 15 SOLUTION RESPIRATORY (INHALATION) at 09:56

## 2017-07-07 RX ADMIN — BUDESONIDE 500 MCG: 0.5 INHALANT RESPIRATORY (INHALATION) at 09:57

## 2017-07-07 RX ADMIN — FUROSEMIDE 40 MG: 40 TABLET ORAL at 09:56

## 2017-07-07 RX ADMIN — RANITIDINE 150 MG: 150 TABLET ORAL at 09:57

## 2017-07-07 RX ADMIN — DOCUSATE SODIUM 100 MG: 100 CAPSULE, LIQUID FILLED ORAL at 09:57

## 2017-07-07 RX ADMIN — TAMSULOSIN HYDROCHLORIDE 0.4 MG: 0.4 CAPSULE ORAL at 09:56

## 2017-07-07 RX ADMIN — QUETIAPINE FUMARATE 25 MG: 25 TABLET, FILM COATED ORAL at 21:05

## 2017-07-07 NOTE — PROGRESS NOTES
Pt was sitting up in wheelchair in the dining room upon approach. Pt alert and oriented x1. Pt's wife was present. Pt's wife stated \"He likes to take apart things and put them back together, like the TV remote and his razor. \", \"He sometimes likes to play Solitaire. \" With max prompting from RT pt responded to activity interests by nodding head yes and repeating \"Uh huh.\" Pt and pt's wife educated on leisure cabinet and unit activities. Maintain current leisure interests.

## 2017-07-07 NOTE — PROGRESS NOTES
Bedside shift change report given to HOA Chinchilla RN (oncoming nurse) by Ines Lehman. Cole Chinchilla RN (offgoing nurse). Report included the following information SBAR, Kardex and MAR.

## 2017-07-07 NOTE — ROUTINE PROCESS
Bedside and Verbal shift change report given to michelle Blanco rn (oncoming nurse) by jacques Moffett lpn (offgoing nurse). Report included the following information SBAR, Kardex and MAR.  Hourly rounds

## 2017-07-07 NOTE — ROUTINE PROCESS
Bedside and Verbal shift change report given to ARIN Mendoza (oncoming nurse) by LEONIE Love (offgoing nurse). Report included the following information SBAR, Kardex, MAR and Med Rec Status.  Visually checked on patient hourly

## 2017-07-07 NOTE — PROGRESS NOTES
Problem: Mobility Impaired (Adult and Pediatric)  Goal: *Acute Goals and Plan of Care (Insert Text)  PHYSICAL THERAPY STG GOALS :  Initiated 6/28/2017 and to be accomplished within 1-2 Weeks (updated 7/4/17)    1. Patient will move from supine to sit and sit to supine and roll side to side in bed with stand by assistance. (Achieved)   2. Patient will transfer from bed to chair and chair to bed with minimal assistance using RW. (Progressing; mod A x 2)  3. Patient will perform sit to stand with minimal assistance with Fair- balance and safety awareness. (Progressing; mod A/max A x 2)  4. Patient will ambulate with minimal assistance/contact guard assist for 10 feet with RW on level surfaces with 1 turns. (Pt unable due to poor endurance)  5. Patient will improve standardized test score for [place] Standing Balance Scale 1+. (Not progressed; 0)  6. Patient will demonstrate transfer from electric w/c <> car with minimal assistance. (Not addressed)    PHYSICAL THERAPY LTG GOALS :  Initiated 6/28/2017 and to be accomplished within 3-4 Weeks    1. Patient will move from supine to sit and sit to supine and roll side to side in bed with supervision/set-up. 2. Patient will transfer from bed to chair and chair to bed with contact guard assist using RW. 3. Patient will perform sit to stand with contact guard assist with Fair balance and safety awareness. 4. Patient will ambulate with contact guard assist for 20 feet with RW on level surfaces with 2 turns. 5. Patient will improve standardized test score for [place] Standing Balance Scale 2.   6. Patient will demonstrate transfer from electric w/c <> car with contact guard assistance.      Physical Therapist: Joey Byrd, PT on 6/28/2017    [facility]   PHYSICAL THERAPY DAILY TREATMENT NOTE        Patient: Kevin Tai (77 y.o. male)               Date: 7/7/2017    Physician: Magaly Miller MD  Primary Diagnosis: Pneumonia          Treatment Diagnosis  Treatment Diagnosis: muscle weakness  Treatment Diagnosis 2: difficulty in walking  Precautions: Fall, Other (comment) (oxygen)  Vital Signs  Cognitive Status:  Mental Status  Neurologic State: Alert; Appropriate for age  Orientation Level: Oriented to person  Pain  Bed Mobility Training  Bed Mobility Training  Rolling: Stand-by asssistance  Supine to Sit: Minimum assistance  Scooting: Stand-by asssistance  Balance  Sitting: With support  Sitting - Static: Fair (occasional)  Sitting - Dynamic: Fair (occasional)  Standing: With support  Standing - Static: Poor  Standing - Dynamic : None  Transfer Training  Transfer Training  Sit to Stand: Moderate assistance;Assist x2  Stand to Sit: Moderate assistance  Stand Pivot Transfers: Maximum assistance  Bed to Chair: Maximum assistance  Interventions: Safety awareness training;Verbal cues; Tactile cues  Sit to Stand: Moderate assistance;Assist x2  Gait Training  Therapeutic Exercise: Pt presented supine in bed without O2 donned. Therapist assisted pt with donning O2. Bed mobility rendered to include rolling L<>R, instruction on bridging for ease of scooting and self care and supine>sit. Pt required additional time and assistance with supine>sit today. SPT continued to be at [facility] at // with B UE support and Mod A x2 to achieve standing. Pt stood x3 for ~10\" before requesting to sit due to fatigue. Pt able to stand x1 for 17\" with encouragement. Pt with SOB post standing and required instruction on pursed lip breathing techniques. Sp02 at 90%. At end of session, pt sitting up in w/c at bedside with chair alarm and call bell. Patient/Caregiver Education:   Pt /Caregiver Education on safety and fall prevention, scooting in bed and OOB was provided to reduce risk of falls and maximize gains. ASSESSMENT:  Patient continues to benefit from Skilled PT services to improve bed mobility, transfers, sit<>stand, strength, balance and gait.   Progression toward goals:  [ ]      Improving appropriately and progressing toward goals  [X]      Improving slowly and progressing toward goals  [X]      Not making progress toward goals and plan of care will be adjusted      Treatment session: 35 minutes.   Therapist:   Roxana Courtney PTA,          7/7/2017

## 2017-07-07 NOTE — PROGRESS NOTES
Problem: Self Care Deficits Care Plan (Adult)  Goal: *Acute Goals and Plan of Care (Insert Text)  OCCUPATIONAL THERAPY SHORT TERM GOALS   Initiated 7/1/2017 and to be accomplished within 2 Week(s)    1. Patient will perform Upper body ADLs with/without adaptive equipment with minimal assistance/contact guard assist.  2. Patient will perform Lower body ADLs with/without adaptive equipment with moderate assistance. 3. Patient will perform toileting task with moderate assistance with Fair safety to reduce falls risk. 4. Patient will perform functional transfers with Reatha Cords and maximal assistance. 5. Patient will perform standing static/dynamic balance activities for improved ADL/IADL function with moderate assistance and Fair balance and safety awarenes. 6. Patient will improve Barthel index scores to atleast 45/100 to improve functional mobility. OCCUPATIONAL THERAPY LONG TERM GOALS   Initiated 7/1/2017 and to be accomplished within 4 Week(s)    1. Patient will perform Upper body ADLs with/without adaptive equipment with supervision/set-up. 2. Patient will perform Lower body ADLs with/without adaptive equipment with minimal assistance/contact guard assist.  3. Patient will perform toileting task with minimal assistance/contact guard assist with Good safety to reduce falls risk. 4. Patient will perform functional transfers with Rolling Walker and moderate assistance and Fair+ balance and safety awareness. 5. Patient will perform standing static/dynamic activity for improved ADL/IADL function with minimal assistance/contact guard assist an and Fair balance and safety awareness. 6. Patient will improve Barthel index score to 55/100 to improve independence with mobility.     Therapist: Yousuf Taveras MS OTR/L 7/1/2017   [facility]   OCCUPATIONAL THERAPY DAILY TREATMENT NOTE        Patient: Gale Carter (42 y.o. male)                          Date: 7/7/2017  Attending Physician: Lakesha Cota MD  Primary Diagnosis: Pneumonia    Treatment Diagnosis  Treatment Diagnosis: muscle weakness  Treatment Diagnosis 2: difficulty in walking   Precautions : Precautions at Admission: Fall, Other (comment) (oxygen)  Vital Signs:  Vital Signs  Pulse (Heart Rate): 71  Heart Rate Source: Monitor  Resp Rate: 16  O2 Sat (%): 97 %  Level of Consciousness: Alert  BP: 141/58  MAP (Calculated): 86  BP 1 Method: Automatic  BP 1 Location: Left arm  BP Patient Position: At rest;Sitting     Cognitive Status:  Mental Status  Neurologic State: Confused  Orientation Level: Oriented to person  Cognition: Decreased attention/concentration;Poor safety awareness  Pain:  Pain Screen  Pain Scale 1: Numeric (0 - 10)  Pain Intensity 1: 0  Patient Stated Pain Goal: 0  Pain Scale 1: Numeric (0 - 10)  Gross Assessment:     Coordination:     Bed Mobility:  Bed Mobility  Rolling: Stand-by asssistance  Supine to Sit: Minimum assistance  Scooting: Stand-by asssistance  Transfers:  Functional Transfers  Sit to Stand: Moderate assistance;Assist x2  Stand to Sit: Moderate assistance  Bed to Chair: Maximum assistance     Balance:  Balance  Sitting: With support  Sitting - Static: Fair (occasional)  Sitting - Dynamic: Fair (occasional)  Standing: With support  Standing - Static: Poor  Standing - Dynamic : None           Therapeutic Exercises:  UB strengthening with 2#, 2 sets x 15 reps in order to increase functional activity tolerance and UB muscle strength needed for ADLs  Patient/Caregiver Education:    Alex Elliott Education on self pacing during exericises was provided for optimal energy conservation and strengthening.         ASSESSMENT:  Patient continues to demonstrate the need for skilled Occupational Therapy services to improve static standing balance needed for toilet transfer  Progression toward goals:  [ ]      Improving appropriately and progressing toward goals  [X]      Improving slowly and progressing toward goals  [ ] Not making progress toward goals and plan of care will be adjusted      Treatment session:   33 minutes     Therapist:    OFE Dill,  7/7/2017

## 2017-07-08 PROCEDURE — 74011636637 HC RX REV CODE- 636/637: Performed by: INTERNAL MEDICINE

## 2017-07-08 PROCEDURE — 74011000250 HC RX REV CODE- 250: Performed by: INTERNAL MEDICINE

## 2017-07-08 PROCEDURE — 74011250637 HC RX REV CODE- 250/637: Performed by: INTERNAL MEDICINE

## 2017-07-08 RX ADMIN — RANITIDINE 150 MG: 150 TABLET ORAL at 09:41

## 2017-07-08 RX ADMIN — ASPIRIN 81 MG 81 MG: 81 TABLET ORAL at 09:42

## 2017-07-08 RX ADMIN — DILTIAZEM HYDROCHLORIDE 180 MG: 180 CAPSULE, COATED, EXTENDED RELEASE ORAL at 09:43

## 2017-07-08 RX ADMIN — PREDNISONE 7.5 MG: 5 TABLET ORAL at 09:42

## 2017-07-08 RX ADMIN — BUDESONIDE 500 MCG: 0.5 INHALANT RESPIRATORY (INHALATION) at 20:39

## 2017-07-08 RX ADMIN — CLOPIDOGREL BISULFATE 75 MG: 75 TABLET, FILM COATED ORAL at 09:40

## 2017-07-08 RX ADMIN — FUROSEMIDE 40 MG: 40 TABLET ORAL at 17:44

## 2017-07-08 RX ADMIN — MIRTAZAPINE 15 MG: 15 TABLET, FILM COATED ORAL at 21:37

## 2017-07-08 RX ADMIN — LACTULOSE 20 G: 10 SOLUTION ORAL at 09:43

## 2017-07-08 RX ADMIN — QUETIAPINE FUMARATE 25 MG: 25 TABLET, FILM COATED ORAL at 21:37

## 2017-07-08 RX ADMIN — DOCUSATE SODIUM 100 MG: 100 CAPSULE, LIQUID FILLED ORAL at 17:44

## 2017-07-08 RX ADMIN — FUROSEMIDE 40 MG: 40 TABLET ORAL at 09:41

## 2017-07-08 RX ADMIN — TAMSULOSIN HYDROCHLORIDE 0.4 MG: 0.4 CAPSULE ORAL at 09:41

## 2017-07-08 RX ADMIN — DOCUSATE SODIUM 100 MG: 100 CAPSULE, LIQUID FILLED ORAL at 09:42

## 2017-07-08 RX ADMIN — BUDESONIDE 500 MCG: 0.5 INHALANT RESPIRATORY (INHALATION) at 09:40

## 2017-07-08 RX ADMIN — ARFORMOTEROL TARTRATE 15 MCG: 15 SOLUTION RESPIRATORY (INHALATION) at 20:39

## 2017-07-08 RX ADMIN — RANITIDINE 150 MG: 150 TABLET ORAL at 17:44

## 2017-07-08 RX ADMIN — LACTULOSE 20 G: 10 SOLUTION ORAL at 17:44

## 2017-07-08 RX ADMIN — ARFORMOTEROL TARTRATE 15 MCG: 15 SOLUTION RESPIRATORY (INHALATION) at 08:00

## 2017-07-08 RX ADMIN — FINASTERIDE 5 MG: 5 TABLET, FILM COATED ORAL at 09:55

## 2017-07-08 RX ADMIN — LEVOTHYROXINE SODIUM 50 MCG: 50 TABLET ORAL at 09:41

## 2017-07-08 NOTE — ROUTINE PROCESS
Bedside and Verbal shift change report given to Marshall Manzo LPN (oncoming nurse) by Weston Leigh RN (offgoing nurse). Report included the following information SBAR, Kardex and Accordion.  24 hour chart check completed and pt visually checked q 1 hour by nursing staff

## 2017-07-09 PROCEDURE — 74011636637 HC RX REV CODE- 636/637: Performed by: INTERNAL MEDICINE

## 2017-07-09 PROCEDURE — 74011250637 HC RX REV CODE- 250/637: Performed by: INTERNAL MEDICINE

## 2017-07-09 PROCEDURE — 74011000250 HC RX REV CODE- 250: Performed by: INTERNAL MEDICINE

## 2017-07-09 RX ADMIN — FUROSEMIDE 40 MG: 40 TABLET ORAL at 09:02

## 2017-07-09 RX ADMIN — ASPIRIN 81 MG 81 MG: 81 TABLET ORAL at 09:26

## 2017-07-09 RX ADMIN — PREDNISONE 7.5 MG: 5 TABLET ORAL at 09:27

## 2017-07-09 RX ADMIN — RANITIDINE 150 MG: 150 TABLET ORAL at 17:36

## 2017-07-09 RX ADMIN — MIRTAZAPINE 15 MG: 15 TABLET, FILM COATED ORAL at 21:52

## 2017-07-09 RX ADMIN — DOCUSATE SODIUM 100 MG: 100 CAPSULE, LIQUID FILLED ORAL at 17:36

## 2017-07-09 RX ADMIN — CLOPIDOGREL BISULFATE 75 MG: 75 TABLET, FILM COATED ORAL at 09:02

## 2017-07-09 RX ADMIN — FUROSEMIDE 40 MG: 40 TABLET ORAL at 17:36

## 2017-07-09 RX ADMIN — LACTULOSE 20 G: 10 SOLUTION ORAL at 17:36

## 2017-07-09 RX ADMIN — BUDESONIDE 500 MCG: 0.5 INHALANT RESPIRATORY (INHALATION) at 09:27

## 2017-07-09 RX ADMIN — DILTIAZEM HYDROCHLORIDE 180 MG: 180 CAPSULE, COATED, EXTENDED RELEASE ORAL at 09:02

## 2017-07-09 RX ADMIN — QUETIAPINE FUMARATE 25 MG: 25 TABLET, FILM COATED ORAL at 21:52

## 2017-07-09 RX ADMIN — ARFORMOTEROL TARTRATE 15 MCG: 15 SOLUTION RESPIRATORY (INHALATION) at 21:52

## 2017-07-09 RX ADMIN — ARFORMOTEROL TARTRATE 15 MCG: 15 SOLUTION RESPIRATORY (INHALATION) at 09:27

## 2017-07-09 RX ADMIN — FINASTERIDE 5 MG: 5 TABLET, FILM COATED ORAL at 09:00

## 2017-07-09 RX ADMIN — RANITIDINE 150 MG: 150 TABLET ORAL at 09:00

## 2017-07-09 RX ADMIN — LEVOTHYROXINE SODIUM 50 MCG: 50 TABLET ORAL at 07:30

## 2017-07-09 RX ADMIN — DOCUSATE SODIUM 100 MG: 100 CAPSULE, LIQUID FILLED ORAL at 09:02

## 2017-07-09 RX ADMIN — LACTULOSE 20 G: 10 SOLUTION ORAL at 09:03

## 2017-07-09 RX ADMIN — BUDESONIDE 500 MCG: 0.5 INHALANT RESPIRATORY (INHALATION) at 21:52

## 2017-07-09 RX ADMIN — TAMSULOSIN HYDROCHLORIDE 0.4 MG: 0.4 CAPSULE ORAL at 09:28

## 2017-07-09 NOTE — ROUTINE PROCESS
Bedside and Verbal shift change report given to Liz Colón RN (oncoming nurse) by Natalia Larose RN (offgoing nurse). Report included the following information SBAR, ED Summary and MAR. Hourly rounds made. 24 hr chart check.

## 2017-07-09 NOTE — PROGRESS NOTES
Problem: Self Care Deficits Care Plan (Adult)  Goal: *Acute Goals and Plan of Care (Insert Text)  OCCUPATIONAL THERAPY SHORT TERM GOALS   Initiated 7/1/2017 and to be accomplished within 2 Week(s)    1. Patient will perform Upper body ADLs with/without adaptive equipment with minimal assistance/contact guard assist.  2. Patient will perform Lower body ADLs with/without adaptive equipment with moderate assistance. 3. Patient will perform toileting task with moderate assistance with Fair safety to reduce falls risk. 4. Patient will perform functional transfers with Corene Seat and maximal assistance. 5. Patient will perform standing static/dynamic balance activities for improved ADL/IADL function with moderate assistance and Fair balance and safety awarenes. 6. Patient will improve Barthel index scores to atleast 45/100 to improve functional mobility. OCCUPATIONAL THERAPY LONG TERM GOALS   Initiated 7/1/2017 and to be accomplished within 4 Week(s)    1. Patient will perform Upper body ADLs with/without adaptive equipment with supervision/set-up. 2. Patient will perform Lower body ADLs with/without adaptive equipment with minimal assistance/contact guard assist.  3. Patient will perform toileting task with minimal assistance/contact guard assist with Good safety to reduce falls risk. 4. Patient will perform functional transfers with Rolling Walker and moderate assistance and Fair+ balance and safety awareness. 5. Patient will perform standing static/dynamic activity for improved ADL/IADL function with minimal assistance/contact guard assist an and Fair balance and safety awareness. 6. Patient will improve Barthel index score to 55/100 to improve independence with mobility.     Therapist: Lois Díaz MS OTR/JAUN 7/1/2017   [facility]   OCCUPATIONAL THERAPY DAILY TREATMENT NOTE        Patient: Basim Roberts (74 y.o. male)                          Date: 7/9/2017  Attending Physician: Halle Baldwin MD  Primary Diagnosis: Pneumonia    Treatment Diagnosis  Treatment Diagnosis: muscle weakness  Treatment Diagnosis 2: difficulty in walking   Precautions : Precautions at Admission: Fall, Other (comment) (oxygen)  Vital Signs:  Vital Signs  Pulse (Heart Rate): 93  BP: 136/61     Balance:  Balance  Sitting: With support  Sitting - Static: Fair (occasional)  Sitting - Dynamic: Fair (occasional)  Therapeutic Activities:  Pt performed dyn sitting task reaching in mult planes to increase activity tolerance and endurance for improved overall fnxl performance. 2L O2 on entire tx session, O2 stats taken ranging 93-96%. Therapeutic Exercises:   BUE Ex with orange theraband 4 x 10 and red medicine ball 3 x 10 to increase UB/core strength to aid with all fnxl transfers. Patient/Caregiver Education:    Pt educated on safety and fall prevention for optimal gains. ASSESSMENT:  Patient continues to demonstrate the need for skilled Occupational Therapy services to improve strength, balance, and endurance.  Progression toward goals:  [ ]      Improving appropriately and progressing toward goals  [X]      Improving slowly and progressing toward goals  [ ]      Not making progress toward goals and plan of care will be adjusted      Treatment session:   30  minutes  Therapist:    OFE Jorge,  7/9/2017

## 2017-07-10 PROCEDURE — 74011250637 HC RX REV CODE- 250/637: Performed by: INTERNAL MEDICINE

## 2017-07-10 PROCEDURE — 74011000250 HC RX REV CODE- 250: Performed by: INTERNAL MEDICINE

## 2017-07-10 PROCEDURE — 77030029684 HC NEB SM VOL KT MONA -A

## 2017-07-10 PROCEDURE — 74011636637 HC RX REV CODE- 636/637: Performed by: INTERNAL MEDICINE

## 2017-07-10 RX ADMIN — LEVOTHYROXINE SODIUM 50 MCG: 50 TABLET ORAL at 09:50

## 2017-07-10 RX ADMIN — LACTULOSE 20 G: 10 SOLUTION ORAL at 18:00

## 2017-07-10 RX ADMIN — PREDNISONE 7.5 MG: 5 TABLET ORAL at 09:49

## 2017-07-10 RX ADMIN — ARFORMOTEROL TARTRATE 15 MCG: 15 SOLUTION RESPIRATORY (INHALATION) at 21:11

## 2017-07-10 RX ADMIN — MIRTAZAPINE 15 MG: 15 TABLET, FILM COATED ORAL at 21:11

## 2017-07-10 RX ADMIN — DOCUSATE SODIUM 100 MG: 100 CAPSULE, LIQUID FILLED ORAL at 09:49

## 2017-07-10 RX ADMIN — QUETIAPINE FUMARATE 25 MG: 25 TABLET, FILM COATED ORAL at 21:11

## 2017-07-10 RX ADMIN — FINASTERIDE 5 MG: 5 TABLET, FILM COATED ORAL at 11:51

## 2017-07-10 RX ADMIN — BUDESONIDE 500 MCG: 0.5 INHALANT RESPIRATORY (INHALATION) at 21:11

## 2017-07-10 RX ADMIN — ASPIRIN 81 MG 81 MG: 81 TABLET ORAL at 09:50

## 2017-07-10 RX ADMIN — BUDESONIDE 500 MCG: 0.5 INHALANT RESPIRATORY (INHALATION) at 09:48

## 2017-07-10 RX ADMIN — DOCUSATE SODIUM 100 MG: 100 CAPSULE, LIQUID FILLED ORAL at 18:00

## 2017-07-10 RX ADMIN — FUROSEMIDE 40 MG: 40 TABLET ORAL at 09:50

## 2017-07-10 RX ADMIN — LACTULOSE 20 G: 10 SOLUTION ORAL at 09:49

## 2017-07-10 RX ADMIN — TAMSULOSIN HYDROCHLORIDE 0.4 MG: 0.4 CAPSULE ORAL at 09:50

## 2017-07-10 RX ADMIN — CLOPIDOGREL BISULFATE 75 MG: 75 TABLET, FILM COATED ORAL at 09:49

## 2017-07-10 RX ADMIN — DILTIAZEM HYDROCHLORIDE 180 MG: 180 CAPSULE, COATED, EXTENDED RELEASE ORAL at 09:50

## 2017-07-10 RX ADMIN — RANITIDINE 150 MG: 150 TABLET ORAL at 09:50

## 2017-07-10 RX ADMIN — ARFORMOTEROL TARTRATE 15 MCG: 15 SOLUTION RESPIRATORY (INHALATION) at 09:48

## 2017-07-10 RX ADMIN — RANITIDINE 150 MG: 150 TABLET ORAL at 18:00

## 2017-07-10 RX ADMIN — FUROSEMIDE 40 MG: 40 TABLET ORAL at 18:00

## 2017-07-10 NOTE — ROUTINE PROCESS
Bedside shift change report given to Darshana Renteria Lpn (oncoming nurse) by Naomi Miller RN (offgoing nurse). Report included the following information SBAR, Kardex, MAR and Recent Results VISUALLY CHECKED PT Q 1 HR BY NURSING STAFF. 24 hourorder chart check done. Corina Valero

## 2017-07-10 NOTE — PROGRESS NOTES
Problem: Mobility Impaired (Adult and Pediatric)  Goal: *Acute Goals and Plan of Care (Insert Text)  PHYSICAL THERAPY STG GOALS :  Initiated 6/28/2017 and to be accomplished within 1-2 Weeks (updated 7/4/17)    1. Patient will move from supine to sit and sit to supine and roll side to side in bed with stand by assistance. (Achieved)   2. Patient will transfer from bed to chair and chair to bed with minimal assistance using RW. (Progressing; mod A x 2)  3. Patient will perform sit to stand with minimal assistance with Fair- balance and safety awareness. (Progressing; mod A/max A x 2)  4. Patient will ambulate with minimal assistance/contact guard assist for 10 feet with RW on level surfaces with 1 turns. (Pt unable due to poor endurance)  5. Patient will improve standardized test score for [place] Standing Balance Scale 1+. (Not progressed; 0)  6. Patient will demonstrate transfer from electric w/c <> car with minimal assistance. (Not addressed)    PHYSICAL THERAPY LTG GOALS :  Initiated 6/28/2017 and to be accomplished within 3-4 Weeks    1. Patient will move from supine to sit and sit to supine and roll side to side in bed with supervision/set-up. 2. Patient will transfer from bed to chair and chair to bed with contact guard assist using RW. 3. Patient will perform sit to stand with contact guard assist with Fair balance and safety awareness. 4. Patient will ambulate with contact guard assist for 20 feet with RW on level surfaces with 2 turns. 5. Patient will improve standardized test score for [place] Standing Balance Scale 2.   6. Patient will demonstrate transfer from electric w/c <> car with contact guard assistance.      Physical Therapist: Geoff Beltran, PT on 6/28/2017    [facility]   PHYSICAL THERAPY DAILY TREATMENT NOTE        Patient: Mynor Negro (45 y.o. male)               Date: 7/10/2017    Physician: Fara Prieto MD  Primary Diagnosis: Pneumonia          Treatment Diagnosis  Treatment Diagnosis: muscle weakness  Treatment Diagnosis 2: difficulty in walking  Precautions: Fall, Other (comment) (oxygen)  Vital Signs        Cognitive Status:  Mental Status  Neurologic State: Confused  Orientation Level: Oriented to person  Cognition: Follows commands  Pain     Bed Mobility Training  Bed Mobility Training  Supine to Sit: Minimum assistance  Sit to Supine: Minimum assistance  Balance  Sitting: Impaired; With support  Sitting - Static: Fair (occasional)  Sitting - Dynamic: Fair (occasional) (-)  Standing: Impaired;Pull to stand; With support  Standing - Static: Poor  Transfer Training  Transfer Training  Sit to Stand:  (unable with total A x 1)  Sit to Stand:  (unable with total A x 1)  Gait Training                      Therapeutic Exercise:    Pt asleep upon presentation, requiring much encouragement to participate. Bed mobility with min A then required rest break afterwards due to SOB. Attempted transfer training x 3 reps, pt unable with total A x 1, rest breaks rendered between each rep due to SOB that was increased from normal labored breathing. Nurse notified of SOB and labored breathing. Patient/Caregiver Education:   Pt /Caregiver Education on safety and fall prevention to reduce fall risk. ASSESSMENT:  Patient continues to benefit from Skilled PT services to improve endurance, strength, mobility, transfers. Progression toward goals:  [ ]      Improving appropriately and progressing toward goals  [X]      Improving slowly and progressing toward goals  [ ]      Not making progress toward goals and plan of care will be adjusted      Treatment session: 30 minutes.   Therapist:   Julianne Castanon, PT,          7/10/2017

## 2017-07-10 NOTE — PROGRESS NOTES
I have reviewed this patient's current medication list and recent laboratory results. At this time, I do not suggest any drug therapy adjustments or additional laboratory monitoring. Thank you,  Shae RG  Ph. M. S.  7/10/2017

## 2017-07-10 NOTE — ROUTINE PROCESS
Bedside shift change report given to Polina [address] [place] [address] (oncoming nurse) by Bryon Meckel (offgoing nurse). Report included the following information SBAR, Kardex, Intake/Output and MAR.

## 2017-07-11 PROCEDURE — 74011250637 HC RX REV CODE- 250/637: Performed by: INTERNAL MEDICINE

## 2017-07-11 PROCEDURE — 74011636637 HC RX REV CODE- 636/637: Performed by: INTERNAL MEDICINE

## 2017-07-11 PROCEDURE — 74011000250 HC RX REV CODE- 250: Performed by: INTERNAL MEDICINE

## 2017-07-11 RX ADMIN — MIRTAZAPINE 15 MG: 15 TABLET, FILM COATED ORAL at 21:12

## 2017-07-11 RX ADMIN — LEVOTHYROXINE SODIUM 50 MCG: 50 TABLET ORAL at 09:23

## 2017-07-11 RX ADMIN — RANITIDINE 150 MG: 150 TABLET ORAL at 17:19

## 2017-07-11 RX ADMIN — TAMSULOSIN HYDROCHLORIDE 0.4 MG: 0.4 CAPSULE ORAL at 09:22

## 2017-07-11 RX ADMIN — FINASTERIDE 5 MG: 5 TABLET, FILM COATED ORAL at 09:23

## 2017-07-11 RX ADMIN — FUROSEMIDE 40 MG: 40 TABLET ORAL at 16:47

## 2017-07-11 RX ADMIN — BUDESONIDE 500 MCG: 0.5 INHALANT RESPIRATORY (INHALATION) at 20:45

## 2017-07-11 RX ADMIN — FUROSEMIDE 40 MG: 40 TABLET ORAL at 09:22

## 2017-07-11 RX ADMIN — LACTULOSE 20 G: 10 SOLUTION ORAL at 09:22

## 2017-07-11 RX ADMIN — ASPIRIN 81 MG 81 MG: 81 TABLET ORAL at 09:22

## 2017-07-11 RX ADMIN — PREDNISONE 7.5 MG: 5 TABLET ORAL at 09:23

## 2017-07-11 RX ADMIN — ARFORMOTEROL TARTRATE 15 MCG: 15 SOLUTION RESPIRATORY (INHALATION) at 20:45

## 2017-07-11 RX ADMIN — RANITIDINE 150 MG: 150 TABLET ORAL at 09:23

## 2017-07-11 RX ADMIN — BUDESONIDE 500 MCG: 0.5 INHALANT RESPIRATORY (INHALATION) at 09:19

## 2017-07-11 RX ADMIN — DILTIAZEM HYDROCHLORIDE 180 MG: 180 CAPSULE, COATED, EXTENDED RELEASE ORAL at 09:23

## 2017-07-11 RX ADMIN — LACTULOSE 20 G: 10 SOLUTION ORAL at 17:19

## 2017-07-11 RX ADMIN — QUETIAPINE FUMARATE 25 MG: 25 TABLET, FILM COATED ORAL at 21:12

## 2017-07-11 RX ADMIN — CLOPIDOGREL BISULFATE 75 MG: 75 TABLET, FILM COATED ORAL at 09:23

## 2017-07-11 RX ADMIN — DOCUSATE SODIUM 100 MG: 100 CAPSULE, LIQUID FILLED ORAL at 17:19

## 2017-07-11 RX ADMIN — DOCUSATE SODIUM 100 MG: 100 CAPSULE, LIQUID FILLED ORAL at 09:22

## 2017-07-11 NOTE — ROUTINE PROCESS
Bedside and Verbal shift change report given to IFRAH MauriceRN (oncoming nurse) by ZAYRA Menchaca LPN (offgoing nurse). Report included the following information SBAR, Kardex and MAR. Nursing rounds completed by nursing staff per facility protocol.

## 2017-07-11 NOTE — PROGRESS NOTES
Problem: Self Care Deficits Care Plan (Adult)  Goal: *Acute Goals and Plan of Care (Insert Text)  OCCUPATIONAL THERAPY SHORT TERM GOALS   Initiated 7/1/2017 and to be accomplished within 2 Week(s)    1. Patient will perform Upper body ADLs with/without adaptive equipment with minimal assistance/contact guard assist.  2. Patient will perform Lower body ADLs with/without adaptive equipment with moderate assistance. 3. Patient will perform toileting task with moderate assistance with Fair safety to reduce falls risk. 4. Patient will perform functional transfers with Alessia Sieve and maximal assistance. 5. Patient will perform standing static/dynamic balance activities for improved ADL/IADL function with moderate assistance and Fair balance and safety awarenes. 6. Patient will improve Barthel index scores to atleast 45/100 to improve functional mobility. OCCUPATIONAL THERAPY LONG TERM GOALS   Initiated 7/1/2017 and to be accomplished within 4 Week(s)    1. Patient will perform Upper body ADLs with/without adaptive equipment with supervision/set-up. 2. Patient will perform Lower body ADLs with/without adaptive equipment with minimal assistance/contact guard assist.  3. Patient will perform toileting task with minimal assistance/contact guard assist with Good safety to reduce falls risk. 4. Patient will perform functional transfers with Rolling Walker and moderate assistance and Fair+ balance and safety awareness. 5. Patient will perform standing static/dynamic activity for improved ADL/IADL function with minimal assistance/contact guard assist an and Fair balance and safety awareness. 6. Patient will improve Barthel index score to 55/100 to improve independence with mobility.     Therapist: Ignacio Jason MS OTR/JAUN 7/1/2017   [facility]   OCCUPATIONAL THERAPY DAILY TREATMENT NOTE        Patient: Micheal Ortiz (53 y.o. male)                          Date: 7/11/2017  Attending Physician: Sim Menjivar MD  Primary Diagnosis: Pneumonia    Treatment Diagnosis  Treatment Diagnosis: muscle weakness  Treatment Diagnosis 2: difficulty in walking   Precautions : Precautions at Admission: Fall, Other (comment) (oxygen)  Vital Signs:        Cognitive Status:  Mental Status  Neurologic State: Confused  Orientation Level: Oriented to person  Cognition: Follows commands  Pain:        Gross Assessment:     Coordination:     Bed Mobility:  Bed Mobility  Supine to Sit: Contact guard assistance; Additional time  Scooting: Contact guard assistance  Transfers:  Functional Transfers  Sit to Stand: Maximum assistance  Stand to Sit: Maximum assistance     Balance:  Balance  Sitting: Impaired; With support  Sitting - Static: Fair (occasional)  Sitting - Dynamic: Fair (occasional)  Standing: Impaired; With support; Intact  Standing - Static: Poor  Standing - Dynamic : None           Therapeutic Exercises:  UB strengthening with 3#, 2 sets x 20 reps (bicep curls) and 2 sets x 10 (chest presses and shoulder lifts) in order to increase functional activity tolerance and UB muscle strength needed for functional transfers. Patient's O2 stats range from 93-96% after exercises and minimum rest breaks needed. Patient/Caregiver Education:    Alex Daniels Gamaliel Education on pursued lip breathing during exericises was provided for optimal energy consveration.         ASSESSMENT:  Patient continues to demonstrate the need for skilled Occupational Therapy services to improve static standing balance needed for bathing  Progression toward goals:  [X]      Improving appropriately and progressing toward goals  [ ]      Improving slowly and progressing toward goals  [ ]      Not making progress toward goals and plan of care will be adjusted      Treatment session:   30 minutes     Therapist:    OFE Dill,  7/11/2017

## 2017-07-11 NOTE — PROGRESS NOTES
Problem: Mobility Impaired (Adult and Pediatric)  Goal: *Acute Goals and Plan of Care (Insert Text)  PHYSICAL THERAPY STG GOALS :  Initiated 6/28/2017 and to be accomplished within 1-2 Weeks (updated 7/11/17)    1. Patient will move from supine to sit and sit to supine and roll side to side in bed with stand by assistance. (Achieved)   2. Patient will transfer from bed to chair and chair to bed with minimal assistance using RW.  (Maintaining at Mod A x2, Max A x1)  3. Patient will perform sit to stand with minimal assistance with Fair- balance and safety awareness. (Maintaining at; max A x 2)  4. Patient will ambulate with minimal assistance/contact guard assist for 10 feet with RW on level surfaces with 1 turns. (Pt unable due to poor endurance)  5. Patient will improve standardized test score for [place] Standing Balance Scale 1+. (Not progressed; 0)  6. Patient will demonstrate transfer from electric w/c <> car with minimal assistance. (Not addressed)    PHYSICAL THERAPY STG GOALS :  Initiated 6/28/2017 and to be accomplished within 1-2 Weeks (updated 7/4/17)    1. Patient will move from supine to sit and sit to supine and roll side to side in bed with stand by assistance. (Achieved)   2. Patient will transfer from bed to chair and chair to bed with minimal assistance using RW. (Progressing; mod A x 2)  3. Patient will perform sit to stand with minimal assistance with Fair- balance and safety awareness. (Progressing; mod A/max A x 2)  4. Patient will ambulate with minimal assistance/contact guard assist for 10 feet with RW on level surfaces with 1 turns. (Pt unable due to poor endurance)  5. Patient will improve standardized test score for [place] Standing Balance Scale 1+. (Not progressed; 0)  6. Patient will demonstrate transfer from electric w/c <> car with minimal assistance. (Not addressed)    PHYSICAL THERAPY LTG GOALS :  Initiated 6/28/2017 and to be accomplished within 3-4 Weeks    1. Patient will move from supine to sit and sit to supine and roll side to side in bed with supervision/set-up. 2. Patient will transfer from bed to chair and chair to bed with contact guard assist using RW. 3. Patient will perform sit to stand with contact guard assist with Fair balance and safety awareness. 4. Patient will ambulate with contact guard assist for 20 feet with RW on level surfaces with 2 turns. 5. Patient will improve standardized test score for [place] Standing Balance Scale 2.   6. Patient will demonstrate transfer from electric w/c <> car with contact guard assistance. Physical Therapist: Anna Maguire PT on 6/28/2017    [facility]   PHYSICAL THERAPY WEEKLY PROGRESS REPORT  Reporting Period:  Date:  7/4/17 to 7/11/17        Patient: Ivory Poe (18 y.o. male)                         Date: 7/11/2017    Primary Diagnosis: Pneumonia                Attending Physician: Slim Ty MD   Treatment Diagnosis  Treatment Diagnosis: muscle weakness  Treatment Diagnosis 2: difficulty in walking  Precautions:  Fall, Other (comment) (oxygen)  Rehab Potential : Guarded:     Skill interventions and education provided with clinical rationale (include individualized treatment techniques and standardized tests):   Skilled Physical Therapy services were provided with  TA to promote greater independence with bed mobility and transfers, TE to build strength, endurance and flexibility for improved functional mobility, Pt has been able to participate in gait training due to inability to stand for greater than 10\". Neuromuscular reeducation of movement, coordination and balance for reduced risk of falls.             Using a comparative statement, summarize significant progress toward goals as a result of skilled intervention provided:  Patient has made Fair progress towards their Physical Therapy goals in the areas of bed mobility    Identify remaining functional areas, impairments limiting progress and/or barriers to improvement:  Patient would benefit from continues PT services to address the following functional deficits in transfers, sit<>stand, gait, strength and balance.        OBJECTIVE DATA SUMMARY:       INITIAL ASSESSMENT WEEKLY ASSESSMENT   COGNITIVE STATUS COGNITIVE STATUS   Neurologic State: Alert, Confused  Orientation Level: Oriented to person  Cognition: Follows commands  Perception: Appears intact  Perseveration: No perseveration noted  Safety/Judgement: Fall prevention Neurologic State: Confused  Orientation Level: Oriented to person  Cognition: Follows commands  Perception: Appears intact  Perseveration: No perseveration noted  Safety/Judgement: Fall prevention   PAIN PAIN   Pain Scale 1: Numeric (0 - 10)  Pain Intensity 1: 0  Patient Stated Pain Goal: 0  Pain Reassessment 1: Yes Pain Scale 1: Numeric (0 - 10)  Pain Intensity 1: 0  Patient Stated Pain Goal: 0  Pain Reassessment 1: Yes   GROSS ASSESSMENT GROSS ASSESSMENT   AROM: Generally decreased, functional  PROM: Generally decreased, functional  Strength: Generally decreased, functional (BLEs grossly 4/5)  Coordination: Generally decreased, functional  Tone: Normal  Sensation:  (unable to accurately assess; inconsistent responses) AROM: Generally decreased, functional (BUEs: approx 3/4 shoudler flexion)  PROM: Generally decreased, functional (BUEs)  Strength: Generally decreased, functional (BUEs: 3+/5)  Coordination: Generally decreased, functional (BUEs)  Tone: Normal (BUEs)  Sensation:  (unable to accurately assess; inconsistent responses)   BED MOBILITY BED MOBILITY   Rolling: Stand-by asssistance  Supine to Sit: Minimum assistance  Sit to Supine: Stand-by asssistance  Scooting: Stand-by asssistance Rolling: Stand-by asssistance  Supine to Sit: Contact guard assistance, Additional time  Sit to Supine: Minimum assistance  Scooting: Contact guard assistance   GAIT GAIT         Exceptions to WDL (currently unable) Exceptions to WDL (currently unable)               TRANSFERS TRANSFERS   Sit to Stand: Moderate assistance, Assist x2  Stand to Sit: Moderate assistance  Bed to Chair: Total assistance, Assist x2 Sit to Stand: Maximum assistance  Stand to Sit: Maximum assistance  Bed to Chair: Maximum assistance   BALANCE BALANCE   Sitting: Impaired, With support  Sitting - Static: Fair (occasional)  Sitting - Dynamic: Poor (constant support) (+)  Standing: Impaired, Pull to stand, With support  Standing - Static: Poor  Standing - Dynamic :  (not assessed) Sitting: Impaired, With support  Sitting - Static: Fair (occasional)  Sitting - Dynamic: Fair (occasional)  Standing: Impaired, With support, Intact  Standing - Static: Poor  Standing - Dynamic : None   WHEELCHAIR MOBILITY/MGMT WHEELCHAIR MOBILITY/MGMT         Activity Tolerance:  Poor Activity Tolerance: Poor   Visual/Perceptual   Acuity:  (unable to accurately assess)        Visual/Perceptual   Vision  Acuity:  (unable to accurately assess)         Auditory:   Auditory Impairment: Hard of hearing, bilateral      Auditory:   Auditory  Auditory Impairment: None             Clinical Decision making:  [place] standing balance score: 1 Clinical Decision making:  [place] standing balance score:1      Treatment:   Pt presented supine in bed. Pt able to achieve supine>sit with CGA and HOB slightly elevated. Pt continues to require Max a x1 for SPT from EOB>w/c. [place]<>[place] [facility] [place] A x1. Pt unable to stand for ~10\" before needing to sit due to fatigue. Pt utilized 5 L of supplemental 02 throughout session. Sp02 with standing at 88%. Seated B LE TE rendered to promote strength, endurance and flexibility for improved functional mobility: HR/TR, LAQ, hip flexion, ball squeezes, resisted hip abd (yellow band) x15 with verbal and visual cues to stay on task and for proper technique.       Patient's response to treatment rendered:  Fair      Patient expected Discharge Location: [X]Private Residence  [ ] ANNE/ILF  [ Kenia Askew  [ ]Other:      Plan: Continue Skilled PT services as established by the Plan of Care for 5-6 times a week. PT and Assistant have had a weekly case conference regarding the above treatment:  [X] Yes     [ ] No        Treatment session:  30 minutes. Therapist: Dedra Ferraro PTA       Date:7/11/2017  Forward to PT for co-signature when completed.

## 2017-07-12 PROCEDURE — 74011250637 HC RX REV CODE- 250/637: Performed by: INTERNAL MEDICINE

## 2017-07-12 PROCEDURE — 74011636637 HC RX REV CODE- 636/637: Performed by: INTERNAL MEDICINE

## 2017-07-12 PROCEDURE — 74011000250 HC RX REV CODE- 250: Performed by: INTERNAL MEDICINE

## 2017-07-12 RX ADMIN — ARFORMOTEROL TARTRATE 15 MCG: 15 SOLUTION RESPIRATORY (INHALATION) at 20:46

## 2017-07-12 RX ADMIN — CLOPIDOGREL BISULFATE 75 MG: 75 TABLET, FILM COATED ORAL at 09:31

## 2017-07-12 RX ADMIN — MIRTAZAPINE 15 MG: 15 TABLET, FILM COATED ORAL at 21:55

## 2017-07-12 RX ADMIN — ASPIRIN 81 MG 81 MG: 81 TABLET ORAL at 09:31

## 2017-07-12 RX ADMIN — FUROSEMIDE 40 MG: 40 TABLET ORAL at 17:10

## 2017-07-12 RX ADMIN — DOCUSATE SODIUM 100 MG: 100 CAPSULE, LIQUID FILLED ORAL at 17:10

## 2017-07-12 RX ADMIN — BUDESONIDE 500 MCG: 0.5 INHALANT RESPIRATORY (INHALATION) at 09:25

## 2017-07-12 RX ADMIN — RANITIDINE 150 MG: 150 TABLET ORAL at 09:31

## 2017-07-12 RX ADMIN — FUROSEMIDE 40 MG: 40 TABLET ORAL at 09:35

## 2017-07-12 RX ADMIN — BUDESONIDE 500 MCG: 0.5 INHALANT RESPIRATORY (INHALATION) at 20:46

## 2017-07-12 RX ADMIN — DILTIAZEM HYDROCHLORIDE 180 MG: 180 CAPSULE, COATED, EXTENDED RELEASE ORAL at 09:34

## 2017-07-12 RX ADMIN — LACTULOSE 20 G: 10 SOLUTION ORAL at 09:29

## 2017-07-12 RX ADMIN — RANITIDINE 150 MG: 150 TABLET ORAL at 17:10

## 2017-07-12 RX ADMIN — DOCUSATE SODIUM 100 MG: 100 CAPSULE, LIQUID FILLED ORAL at 09:29

## 2017-07-12 RX ADMIN — ARFORMOTEROL TARTRATE 15 MCG: 15 SOLUTION RESPIRATORY (INHALATION) at 09:25

## 2017-07-12 RX ADMIN — PREDNISONE 7.5 MG: 5 TABLET ORAL at 09:29

## 2017-07-12 RX ADMIN — TAMSULOSIN HYDROCHLORIDE 0.4 MG: 0.4 CAPSULE ORAL at 09:29

## 2017-07-12 RX ADMIN — FINASTERIDE 5 MG: 5 TABLET, FILM COATED ORAL at 09:32

## 2017-07-12 RX ADMIN — LACTULOSE 20 G: 10 SOLUTION ORAL at 17:11

## 2017-07-12 RX ADMIN — QUETIAPINE FUMARATE 25 MG: 25 TABLET, FILM COATED ORAL at 21:55

## 2017-07-12 RX ADMIN — LEVOTHYROXINE SODIUM 50 MCG: 50 TABLET ORAL at 09:31

## 2017-07-12 NOTE — ROUTINE PROCESS
Bedside and Verbal shift change report given to [facility] LPN (oncoming nurse) by Wilberto Bocanegra RN (offgoing nurse). Report included the following information SBAR, Kardex and MAR. Hourly rounds made.

## 2017-07-12 NOTE — PROGRESS NOTES
Problem: Mobility Impaired (Adult and Pediatric)  Goal: *Acute Goals and Plan of Care (Insert Text)  PHYSICAL THERAPY STG GOALS :  Initiated 6/28/2017 and to be accomplished within 1-2 Weeks (updated 7/11/17)    1. Patient will move from supine to sit and sit to supine and roll side to side in bed with stand by assistance. (Achieved)   2. Patient will transfer from bed to chair and chair to bed with minimal assistance using RW.  (Maintaining at Mod A x2, Max A x1)  3. Patient will perform sit to stand with minimal assistance with Fair- balance and safety awareness. (Maintaining at; max A x 2)  4. Patient will ambulate with minimal assistance/contact guard assist for 10 feet with RW on level surfaces with 1 turns. (Pt unable due to poor endurance)  5. Patient will improve standardized test score for [place] Standing Balance Scale 1+. (Not progressed; 0)  6. Patient will demonstrate transfer from electric w/c <> car with minimal assistance. (Not addressed)    PHYSICAL THERAPY STG GOALS :  Initiated 6/28/2017 and to be accomplished within 1-2 Weeks (updated 7/4/17)    1. Patient will move from supine to sit and sit to supine and roll side to side in bed with stand by assistance. (Achieved)   2. Patient will transfer from bed to chair and chair to bed with minimal assistance using RW. (Progressing; mod A x 2)  3. Patient will perform sit to stand with minimal assistance with Fair- balance and safety awareness. (Progressing; mod A/max A x 2)  4. Patient will ambulate with minimal assistance/contact guard assist for 10 feet with RW on level surfaces with 1 turns. (Pt unable due to poor endurance)  5. Patient will improve standardized test score for [place] Standing Balance Scale 1+. (Not progressed; 0)  6. Patient will demonstrate transfer from electric w/c <> car with minimal assistance. (Not addressed)    PHYSICAL THERAPY LTG GOALS :  Initiated 6/28/2017 and to be accomplished within 3-4 Weeks    1. Patient will move from supine to sit and sit to supine and roll side to side in bed with supervision/set-up. 2. Patient will transfer from bed to chair and chair to bed with contact guard assist using RW. 3. Patient will perform sit to stand with contact guard assist with Fair balance and safety awareness. 4. Patient will ambulate with contact guard assist for 20 feet with RW on level surfaces with 2 turns. 5. Patient will improve standardized test score for [place] Standing Balance Scale 2.   6. Patient will demonstrate transfer from electric w/c <> car with contact guard assistance. Physical Therapist: Ambreen Helms PT on 6/28/2017    [facility]   PHYSICAL THERAPY DAILY TREATMENT NOTE        Patient: Sterling De La Torre (18 y.o. male)               Date: 7/12/2017    Physician: Lauri Murphy MD  Primary Diagnosis: Pneumonia          Treatment Diagnosis  Treatment Diagnosis: muscle weakness  Treatment Diagnosis 2: difficulty in walking  Precautions: Fall, Other (comment) (oxygen)  Vital Signs  Cognitive Status:  Mental Status  Neurologic State: Confused  Orientation Level: Oriented to person  Cognition: Follows commands  Pain  Bed Mobility Training  Bed Mobility Training  Supine to Sit: Stand-by asssistance (elevated HOB Simultaneous filing. User may not have seen previous data.)  Sit to Supine: Stand-by asssistance (Simultaneous filing. User may not have seen previous data.)  Scooting: Contact guard assistance  Balance  Sitting: Impaired; With support (Simultaneous filing. User may not have seen previous data.)  Sitting - Static: Fair (occasional) (Simultaneous filing. User may not have seen previous data.)  Sitting - Dynamic: Fair (occasional) (Simultaneous filing. User may not have seen previous data.)  Standing: Pull to stand; Impaired; With support (Simultaneous filing. User may not have seen previous data.)  Standing - Static: Poor (Simultaneous filing.  User may not have seen previous data.)  Standing - Dynamic : None (Simultaneous filing. User may not have seen previous data.)  Transfer Training  Transfer Training  Sit to Stand: Maximum assistance (Simultaneous filing. User may not have seen previous data.)  Stand to Sit: Maximum assistance  Stand Pivot Transfers: Total assistance  Bed to Chair: Maximum assistance; Total assistance  Interventions: Safety awareness training;Verbal cues  Sit to Stand: Maximum assistance (Simultaneous filing. User may not have seen previous data.)  Gait Training  Therapeutic Exercise: Partial co-treat with OT to maximize gains with sit<>stand and stand-step transfer with use of RW. Supine>sit with SBA with HOB elevated. Sit<>stand x3 with Max A. Pt unable to maintain standing for more than a few seconds. Attempted stand-step transfer x3 with use of RW. Pt became very agitated and fearful and stated \"Don't let me go\" and \"you have to help me\". Therapist instructed pt on safety and ensured pt to encourage pt to continue to try. Pt unable/unwilling to continue to try stand-step transfer. Pt utilized 4L of supplemental 02 throughout session. Sp02 decreased to 86-88% with activity. Pt instructed on pursed lip breathing technique. Seated B LE TE rendered to promote strength, endurance and flexibility for improved functional mobility: HR/TR, LAQ, hip flexion, ball squeezes, resisted hip abd (yellow band) 2x15. Patient/Caregiver Education:   Pt /Caregiver Education on safety and fall prevention with use of RW for transfers was provided to reduce risk of falls and maximize functional gains. ASSESSMENT:  Patient continues to benefit from Skilled PT services to improve bed mobility, transfers,   Progression toward goals:  [ ]      Improving appropriately and progressing toward goals  [X]      Improving slowly and progressing toward goals  [X]      Not making progress toward goals and plan of care will be adjusted      Treatment session: 50 minutes.   Therapist: Elisa Gaytan, PTA,          7/12/2017

## 2017-07-12 NOTE — PROGRESS NOTES
conducted a Follow up consultation and Spiritual Assessment for Cherylene Madrid, who is a 80 y. o.,male. The  provided the following Interventions:  Continued the relationship of care and support. Listened empathically. Offered prayer and assurance of continued prayer on patients behalf. Chart reviewed. The following outcomes were achieved:  Patient expressed gratitude for pastoral care visit. Assessment:  There are no further spiritual or Gnosticist issues which require Spiritual Care Services interventions at this time. Plan:  Chaplains will continue to follow and will provide pastoral care on an as needed/requested basis.  recommends bedside caregivers page  on duty if patient shows signs of acute spiritual or emotional distress.      [address] [facility]   Staff [address] [facility]   [phone]

## 2017-07-12 NOTE — PROGRESS NOTES
Problem: Self Care Deficits Care Plan (Adult)  Goal: *Acute Goals and Plan of Care (Insert Text)  OCCUPATIONAL THERAPY SHORT TERM GOALS   Initiated 7/1/2017 and to be accomplished within 2 Week(s)    1. Patient will perform Upper body ADLs with/without adaptive equipment with minimal assistance/contact guard assist.  2. Patient will perform Lower body ADLs with/without adaptive equipment with moderate assistance. 3. Patient will perform toileting task with moderate assistance with Fair safety to reduce falls risk. 4. Patient will perform functional transfers with Mathew Devon and maximal assistance. 5. Patient will perform standing static/dynamic balance activities for improved ADL/IADL function with moderate assistance and Fair balance and safety awarenes. 6. Patient will improve Barthel index scores to atleast 45/100 to improve functional mobility. OCCUPATIONAL THERAPY LONG TERM GOALS   Initiated 7/1/2017 and to be accomplished within 4 Week(s)    1. Patient will perform Upper body ADLs with/without adaptive equipment with supervision/set-up. 2. Patient will perform Lower body ADLs with/without adaptive equipment with minimal assistance/contact guard assist.  3. Patient will perform toileting task with minimal assistance/contact guard assist with Good safety to reduce falls risk. 4. Patient will perform functional transfers with Rolling Walker and moderate assistance and Fair+ balance and safety awareness. 5. Patient will perform standing static/dynamic activity for improved ADL/IADL function with minimal assistance/contact guard assist an and Fair balance and safety awareness. 6. Patient will improve Barthel index score to 55/100 to improve independence with mobility.     Therapist: MS ADRIAN Vang/L 7/1/2017   [facility]   OCCUPATIONAL THERAPY DAILY TREATMENT NOTE        Patient: Ivory Poe (5 y.o. male)                          Date: 7/12/2017  Attending Physician: Marie Franco MD  Primary Diagnosis: Pneumonia    Treatment Diagnosis  Treatment Diagnosis: muscle weakness  Treatment Diagnosis 2: difficulty in walking   Precautions : Precautions at Admission: Fall, Other (comment) (oxygen)  Vital Signs:        Cognitive Status:  Mental Status  Neurologic State: Confused  Orientation Level: Oriented to person  Cognition: Follows commands  Pain:        Gross Assessment:     Coordination:     Bed Mobility:  Bed Mobility  Supine to Sit: Stand-by asssistance (elevated HOB Simultaneous filing. User may not have seen previous data.)  Sit to Supine: Stand-by asssistance (Simultaneous filing. User may not have seen previous data.)  Scooting: Contact guard assistance  Transfers:  Functional Transfers  Sit to Stand: Maximum assistance; Additional time;Assist x2  Bed to Chair: Maximum assistance; Total assistance     Balance:  Balance  Sitting: Impaired; With support (Simultaneous filing. User may not have seen previous data.)  Sitting - Static: Fair (occasional) (Simultaneous filing. User may not have seen previous data.)  Sitting - Dynamic: Fair (occasional) (Simultaneous filing. User may not have seen previous data.)  Standing: Pull to stand; Impaired; With support (Simultaneous filing. User may not have seen previous data.)  Standing - Static: Poor (Simultaneous filing. User may not have seen previous data.)  Standing - Dynamic : None (Simultaneous filing. User may not have seen previous data.)        Therapeutic Activities:  Co-treated with PT for optimal functional gains with static standing and transfers utilizing RW in order to increase safety and independence during routine. Attempted SPT utilizing RW in order to increase performance and independence needed for functional transfers upon return home. THAKUR and PTA attempted 3 trials however patient unable to sustain standing safely to complete SPT.  Patient demonstrated some fear of falling and cueing needed for upright posture as well as pursued lip breathing due to O2 stats range 86-90%. Patient/Caregiver Education:    Pt. Nicki Baron Education on see above.         ASSESSMENT:  Patient continues to demonstrate the need for skilled Occupational Therapy services to improve independence needed for toilet transfer  Progression toward goals:  [X]      Improving appropriately and progressing toward goals  [ ]      Improving slowly and progressing toward goals  [ ]      Not making progress toward goals and plan of care will be adjusted      Treatment session:   33 minutes     Therapist:    OFE Garcia,  7/12/2017

## 2017-07-13 PROCEDURE — 74011636637 HC RX REV CODE- 636/637: Performed by: INTERNAL MEDICINE

## 2017-07-13 PROCEDURE — 74011250637 HC RX REV CODE- 250/637: Performed by: INTERNAL MEDICINE

## 2017-07-13 PROCEDURE — 74011000250 HC RX REV CODE- 250: Performed by: INTERNAL MEDICINE

## 2017-07-13 RX ADMIN — CLOPIDOGREL BISULFATE 75 MG: 75 TABLET, FILM COATED ORAL at 08:09

## 2017-07-13 RX ADMIN — RANITIDINE 150 MG: 150 TABLET ORAL at 18:17

## 2017-07-13 RX ADMIN — FUROSEMIDE 40 MG: 40 TABLET ORAL at 18:17

## 2017-07-13 RX ADMIN — FUROSEMIDE 40 MG: 40 TABLET ORAL at 08:09

## 2017-07-13 RX ADMIN — DILTIAZEM HYDROCHLORIDE 180 MG: 180 CAPSULE, COATED, EXTENDED RELEASE ORAL at 08:10

## 2017-07-13 RX ADMIN — LEVOTHYROXINE SODIUM 50 MCG: 50 TABLET ORAL at 08:10

## 2017-07-13 RX ADMIN — MIRTAZAPINE 15 MG: 15 TABLET, FILM COATED ORAL at 21:00

## 2017-07-13 RX ADMIN — RANITIDINE 150 MG: 150 TABLET ORAL at 08:09

## 2017-07-13 RX ADMIN — QUETIAPINE FUMARATE 25 MG: 25 TABLET, FILM COATED ORAL at 21:00

## 2017-07-13 RX ADMIN — FINASTERIDE 5 MG: 5 TABLET, FILM COATED ORAL at 08:14

## 2017-07-13 RX ADMIN — DOCUSATE SODIUM 100 MG: 100 CAPSULE, LIQUID FILLED ORAL at 18:17

## 2017-07-13 RX ADMIN — ARFORMOTEROL TARTRATE 15 MCG: 15 SOLUTION RESPIRATORY (INHALATION) at 08:09

## 2017-07-13 RX ADMIN — TAMSULOSIN HYDROCHLORIDE 0.4 MG: 0.4 CAPSULE ORAL at 08:09

## 2017-07-13 RX ADMIN — PREDNISONE 7.5 MG: 5 TABLET ORAL at 08:09

## 2017-07-13 RX ADMIN — ARFORMOTEROL TARTRATE 15 MCG: 15 SOLUTION RESPIRATORY (INHALATION) at 20:58

## 2017-07-13 RX ADMIN — ASPIRIN 81 MG 81 MG: 81 TABLET ORAL at 08:09

## 2017-07-13 RX ADMIN — LACTULOSE 20 G: 10 SOLUTION ORAL at 08:09

## 2017-07-13 RX ADMIN — BUDESONIDE 500 MCG: 0.5 INHALANT RESPIRATORY (INHALATION) at 08:09

## 2017-07-13 RX ADMIN — DOCUSATE SODIUM 100 MG: 100 CAPSULE, LIQUID FILLED ORAL at 08:11

## 2017-07-13 RX ADMIN — BUDESONIDE 500 MCG: 0.5 INHALANT RESPIRATORY (INHALATION) at 21:00

## 2017-07-13 RX ADMIN — LACTULOSE 20 G: 10 SOLUTION ORAL at 18:17

## 2017-07-13 NOTE — ROUTINE PROCESS
Bedside shift change report given to [id number]ano (oncoming nurse) by Susi Gómez RN (offgoing nurse). Report included the following information SBAR, Kardex, MAR and Med Rec Status. VISUALLY CHECKED PT Q 1 HR BY NURSING STAFF. 24 hour order chart check.

## 2017-07-13 NOTE — PROGRESS NOTES
Late entry: family conference held with pt and 2 daughters with [place], [place] and Arlette Krishnamurthy to discuss pt's current status and d/c needs. Pt will require 24 hour care at home due to his physical limitations. Pt will need a hospital bed and wheelchair for home use. Home health for PT/OT recommended. Pt's daughter inquired about transportation home for pt. BAILEY informed pt's daughter that  can't guarantee that medicare will cover the cost of ambulance since pt is able to sit frida wheelchair. BAILEY provided pt's daughter information on w/c van. BAILEY called [facility] transport re: pt using a transport chair per daughters request. BAILEY spoke with Edward Blanton and was told that a transport chair can't be used due to safety reasons and this was relayed to pt's daughter. Pt's daughters were given a d/c date of 7/19/17. BAILEY reviewed the Medicare notice and the appeal process  with pt's family. Pt's daughter Joshua Gottron signed the notice and was given a copy. BAILEY discussed home health referral with pt's family. Pt's daughter signed the [facility] of [facility] and was given a copy. BAILEY spoke with [address] [place] [address] liaison re: referral and pt's d/c on 7/19/17.  Bailey requested order for hospital bed and w/c from MD.

## 2017-07-13 NOTE — PROGRESS NOTES
Problem: Mobility Impaired (Adult and Pediatric)  Goal: *Acute Goals and Plan of Care (Insert Text)  PHYSICAL THERAPY STG GOALS :  Initiated 6/28/2017 and to be accomplished within 1-2 Weeks (updated 7/11/17)    1. Patient will move from supine to sit and sit to supine and roll side to side in bed with stand by assistance. (Achieved)   2. Patient will transfer from bed to chair and chair to bed with minimal assistance using RW.  (Maintaining at Mod A x2, Max A x1)  3. Patient will perform sit to stand with minimal assistance with Fair- balance and safety awareness. (Maintaining at; max A x 2)  4. Patient will ambulate with minimal assistance/contact guard assist for 10 feet with RW on level surfaces with 1 turns. (Pt unable due to poor endurance)  5. Patient will improve standardized test score for [place] Standing Balance Scale 1+. (Not progressed; 0)  6. Patient will demonstrate transfer from electric w/c <> car with minimal assistance. (Not addressed)    PHYSICAL THERAPY STG GOALS :  Initiated 6/28/2017 and to be accomplished within 1-2 Weeks (updated 7/4/17)    1. Patient will move from supine to sit and sit to supine and roll side to side in bed with stand by assistance. (Achieved)   2. Patient will transfer from bed to chair and chair to bed with minimal assistance using RW. (Progressing; mod A x 2)  3. Patient will perform sit to stand with minimal assistance with Fair- balance and safety awareness. (Progressing; mod A/max A x 2)  4. Patient will ambulate with minimal assistance/contact guard assist for 10 feet with RW on level surfaces with 1 turns. (Pt unable due to poor endurance)  5. Patient will improve standardized test score for [place] Standing Balance Scale 1+. (Not progressed; 0)  6. Patient will demonstrate transfer from electric w/c <> car with minimal assistance. (Not addressed)    PHYSICAL THERAPY LTG GOALS :  Initiated 6/28/2017 and to be accomplished within 3-4 Weeks    1. Patient will move from supine to sit and sit to supine and roll side to side in bed with supervision/set-up. 2. Patient will transfer from bed to chair and chair to bed with contact guard assist using RW. 3. Patient will perform sit to stand with contact guard assist with Fair balance and safety awareness. 4. Patient will ambulate with contact guard assist for 20 feet with RW on level surfaces with 2 turns. 5. Patient will improve standardized test score for [place] Standing Balance Scale 2.   6. Patient will demonstrate transfer from electric w/c <> car with contact guard assistance. Physical Therapist: Erlinda Ambrosio PT on 6/28/2017    [facility]   PHYSICAL THERAPY DAILY TREATMENT NOTE        Patient: Jenny Geronimo (47 y.o. male)               Date: 7/13/2017    Physician: Angelica Velasco MD  Primary Diagnosis: Pneumonia          Treatment Diagnosis  Treatment Diagnosis: muscle weakness  Treatment Diagnosis 2: difficulty in walking  Precautions: Fall, Other (comment) (oxygen)  Vital Signs  Vital Signs  Pulse (Heart Rate): 88  Cardiac Rhythm: Atrial fibrillation  BP: 131/70     Cognitive Status:  Mental Status  Neurologic State: Confused  Orientation Level: Oriented to person  Cognition: Follows commands  Pain  Pain Screen  Pain Scale 1: Numeric (0 - 10)  Pain Intensity 1: 0  Patient Stated Pain Goal: 0  Bed Mobility Training  Bed Mobility Training  Rolling: Supervision  Supine to Sit: Stand-by asssistance  Sit to Supine: Stand-by asssistance  Scooting: Stand-by asssistance;Contact guard assistance  Balance  Sitting: With support  Sitting - Static: Fair (occasional)  Sitting - Dynamic: Fair (occasional)  Standing: Impaired;Pull to stand; With support  Standing - Static: Poor  Standing - Dynamic : None  Transfer Training  Transfer Training  Sit to Stand: Maximum assistance  Stand to Sit: Maximum assistance  Stand Pivot Transfers:  Total assistance  Bed to Chair: Total assistance  Interventions: Safety awareness training;Verbal cues; Tactile cues  Sit to Stand: Maximum assistance  Gait Training  Therapeutic Exercise: Pt presented supine in bed. Bed mobility traning provided to include rolling L<>R, supine<>sit, bridging and scooting, see above for levels of assistance. SPT continues to be at total A as pt is unable/unwilling to assist. Seated B LE TE rendered to promote strength and endurance for improved functional mobility: HR/TR, LAQ, hip flexion, ball squeezes, resisted hip abd (yellow band) 2x15. Family/pt care meeting: Therapist attended pt care meeting with pt, pt's daughters, SW and [de-identified]. Therapist reported on levels of assistance. Therapist recommended 24 hour (S)/care. Pt's daughter's reported that they will be getting a hospital bed. Pt had w/c at home, and RW is not needed as pt is unable to use RW and has been unable to participate in gait training. Patient/Caregiver Education:   Pt /Caregiver Education on safety and fall prevention was provided to reduce risk of falls. ASSESSMENT:  Patient continues to benefit from Skilled PT services to improve bed mobility, strength, balance, transfers. Progression toward goals:  [ ]      Improving appropriately and progressing toward goals  [ ]      Improving slowly and progressing toward goals  [X]      Not making progress toward goals and plan of care will be adjusted      Treatment session: 50 minutes.   Therapist:   Edward Ziegler, [facility],          7/13/2017

## 2017-07-13 NOTE — PROGRESS NOTES
Problem: Self Care Deficits Care Plan (Adult)  Goal: *Acute Goals and Plan of Care (Insert Text)  OCCUPATIONAL THERAPY SHORT TERM GOALS   Initiated 7/1/2017 and to be accomplished within 2 Week(s)    1. Patient will perform Upper body ADLs with/without adaptive equipment with minimal assistance/contact guard assist.  2. Patient will perform Lower body ADLs with/without adaptive equipment with moderate assistance. 3. Patient will perform toileting task with moderate assistance with Fair safety to reduce falls risk. 4. Patient will perform functional transfers with [address] and maximal assistance. 5. Patient will perform standing static/dynamic balance activities for improved ADL/IADL function with moderate assistance and Fair balance and safety awarenes. 6. Patient will improve Barthel index scores to atleast 45/100 to improve functional mobility. OCCUPATIONAL THERAPY LONG TERM GOALS   Initiated 7/1/2017 and to be accomplished within 4 Week(s)    1. Patient will perform Upper body ADLs with/without adaptive equipment with supervision/set-up. 2. Patient will perform Lower body ADLs with/without adaptive equipment with minimal assistance/contact guard assist.  3. Patient will perform toileting task with minimal assistance/contact guard assist with Good safety to reduce falls risk. 4. Patient will perform functional transfers with Rolling Walker and moderate assistance and Fair+ balance and safety awareness. 5. Patient will perform standing static/dynamic activity for improved ADL/IADL function with minimal assistance/contact guard assist an and Fair balance and safety awareness. 6. Patient will improve Barthel index score to 55/100 to improve independence with mobility.     Therapist: Eusebia Rowell MS OTR/L 7/1/2017   [facility]   OCCUPATIONAL THERAPY DAILY TREATMENT NOTE        Patient: Jacqui Colorado (76 y.o. male)                          Date: 7/13/2017  Attending Physician: Sim Menjivar MD  Primary Diagnosis: Pneumonia    Treatment Diagnosis  Treatment Diagnosis: muscle weakness  Treatment Diagnosis 2: difficulty in walking   Precautions : Precautions at Admission: Fall, Other (comment) (oxygen)  Vital Signs:  Vital Signs  Pulse (Heart Rate): 88  Cardiac Rhythm: Atrial fibrillation  BP: 131/70     Cognitive Status:  Mental Status  Neurologic State: Confused  Orientation Level: Oriented to person  Cognition: Follows commands  Pain:  Pain Screen  Pain Scale 1: Numeric (0 - 10)  Pain Intensity 1: 0  Patient Stated Pain Goal: 0  Pain Scale 1: Numeric (0 - 10)  Gross Assessment:     Coordination:     Bed Mobility:  Bed Mobility  Rolling: Supervision  Supine to Sit: Stand-by asssistance  Sit to Supine: Stand-by asssistance  Scooting: Stand-by asssistance;Contact guard assistance  Transfers:  Functional Transfers  Sit to Stand: Maximum assistance  Stand to Sit: Maximum assistance  Bed to Chair: Total assistance     Balance:  Balance  Sitting: With support  Sitting - Static: Fair (occasional)  Sitting - Dynamic: Fair (occasional)  Standing: Impaired;Pull to stand; With support  Standing - Static: Poor  Standing - Dynamic : None  Therapeutic Activities:  Collaborated with SW, PTA, patient and patient's two daughters regarding current progression towards OT goals and discharge recommendations. THAKUR informed patient's family and patient due to decreased static standing balance and tolerance as well as safety with functional transfers, she would recommend all ADL tasks bed level for optimal safety and independence for patient and patient' caregivers. THAKUR also informed patient's family she would recommend 24 hour care. Therapeutic Exercises:  UB strengthening with orange T Band, 2 sets x 20 reps in order to increase functional activity tolerance and UB muscle strength needed for functional transfers. Patient/Caregiver Education:    Alex Daniels Gisela Education on see above. ASSESSMENT:  Patient continues to demonstrate the need for skilled Occupational Therapy services to improve independence with toilet transfer  Progression toward goals:  [ ]      Improving appropriately and progressing toward goals  [ ]      Improving slowly and progressing toward goals  [X]      Not making progress toward goals and plan of care will be adjusted      Treatment session:   33 minutes     Therapist:    OFE Krause,  7/13/2017

## 2017-07-13 NOTE — ROUTINE PROCESS
Bedside and Verbal shift change report given to Christine Carney RN (oncoming nurse) by Marek Johnson RN (offgoing nurse). Report included the following information SBAR, Kardex and MAR. Hourly rounds made.

## 2017-07-14 PROCEDURE — 74011636637 HC RX REV CODE- 636/637: Performed by: INTERNAL MEDICINE

## 2017-07-14 PROCEDURE — 74011250637 HC RX REV CODE- 250/637: Performed by: INTERNAL MEDICINE

## 2017-07-14 PROCEDURE — 74011000250 HC RX REV CODE- 250: Performed by: INTERNAL MEDICINE

## 2017-07-14 RX ADMIN — PREDNISONE 7.5 MG: 5 TABLET ORAL at 09:31

## 2017-07-14 RX ADMIN — RANITIDINE 150 MG: 150 TABLET ORAL at 09:31

## 2017-07-14 RX ADMIN — IPRATROPIUM BROMIDE AND ALBUTEROL SULFATE 3 ML: .5; 3 SOLUTION RESPIRATORY (INHALATION) at 12:00

## 2017-07-14 RX ADMIN — CLOPIDOGREL BISULFATE 75 MG: 75 TABLET, FILM COATED ORAL at 09:31

## 2017-07-14 RX ADMIN — BUDESONIDE 500 MCG: 0.5 INHALANT RESPIRATORY (INHALATION) at 09:30

## 2017-07-14 RX ADMIN — ASPIRIN 81 MG 81 MG: 81 TABLET ORAL at 09:31

## 2017-07-14 RX ADMIN — ARFORMOTEROL TARTRATE 15 MCG: 15 SOLUTION RESPIRATORY (INHALATION) at 09:30

## 2017-07-14 RX ADMIN — MIRTAZAPINE 15 MG: 15 TABLET, FILM COATED ORAL at 21:32

## 2017-07-14 RX ADMIN — DILTIAZEM HYDROCHLORIDE 180 MG: 180 CAPSULE, COATED, EXTENDED RELEASE ORAL at 09:31

## 2017-07-14 RX ADMIN — LACTULOSE 20 G: 10 SOLUTION ORAL at 18:23

## 2017-07-14 RX ADMIN — QUETIAPINE FUMARATE 25 MG: 25 TABLET, FILM COATED ORAL at 21:32

## 2017-07-14 RX ADMIN — BUDESONIDE 500 MCG: 0.5 INHALANT RESPIRATORY (INHALATION) at 21:33

## 2017-07-14 RX ADMIN — ARFORMOTEROL TARTRATE 15 MCG: 15 SOLUTION RESPIRATORY (INHALATION) at 21:33

## 2017-07-14 RX ADMIN — LEVOTHYROXINE SODIUM 50 MCG: 50 TABLET ORAL at 09:31

## 2017-07-14 RX ADMIN — FUROSEMIDE 40 MG: 40 TABLET ORAL at 18:23

## 2017-07-14 RX ADMIN — FINASTERIDE 5 MG: 5 TABLET, FILM COATED ORAL at 09:33

## 2017-07-14 RX ADMIN — FUROSEMIDE 40 MG: 40 TABLET ORAL at 09:31

## 2017-07-14 RX ADMIN — RANITIDINE 150 MG: 150 TABLET ORAL at 18:23

## 2017-07-14 RX ADMIN — TAMSULOSIN HYDROCHLORIDE 0.4 MG: 0.4 CAPSULE ORAL at 09:31

## 2017-07-14 RX ADMIN — DOCUSATE SODIUM 100 MG: 100 CAPSULE, LIQUID FILLED ORAL at 09:31

## 2017-07-14 RX ADMIN — DOCUSATE SODIUM 100 MG: 100 CAPSULE, LIQUID FILLED ORAL at 18:23

## 2017-07-14 NOTE — ROUTINE PROCESS
Bedside and verbal shift report given to ARIN Rush (oncoming nurse) by SUDHEER Jenkins LPN (off going nurse). Report included SBAR, MAR and Kardex. Hourly rounds complete.

## 2017-07-14 NOTE — PROGRESS NOTES
BAILEY faxed order for wheelchair  and hospital bed to [facility]. BAILEY requested that pt's daughter Tima Brown be contacted re: delivery and that dme needs to be delivered by Tues 7/19/17 for pt's d/c home on 7/20/17. Bailey spoke with Dahlia Bean from [facility] and they'll received that dme order that was faxed. BAILEY delivered d/c request information to MD's office. Bailey spoke with Nancy Lutz in MD's office to request that information be given to MD to avoid delay with pt's d/c home.

## 2017-07-14 NOTE — ROUTINE PROCESS
Bedside shift change report given to [address] [place] [address] (oncoming nurse) by Deborah Gasca RN (offgoing nurse). Report included the following information SBAR, Kardex, MAR and Recent Results. VISUALLY CHECKED PT Q 1 HR BY NURSING STAFF. 24 hour order chart check done.

## 2017-07-14 NOTE — PROGRESS NOTES
Problem: Self Care Deficits Care Plan (Adult)  Goal: *Acute Goals and Plan of Care (Insert Text)  OCCUPATIONAL THERAPY SHORT TERM GOALS     Updated 7/14/17    1. Patient will perform Upper body ADLs with/without adaptive equipment with Supervision. 2. Patient will perform Lower body ADLs with/without adaptive equipment with moderate assistance. 3. Patient will perform toileting task with moderate assistance with Fair safety to reduce falls risk. 4. Patient will perform functional transfers with Leeroy Hasting and maximal assistance. 5. Patient will perform standing static/dynamic balance activities for improved ADL/IADL function with moderate assistance and Fair balance and safety awarenes. 6. Patient will improve Barthel index scores to atleast 45/100 to improve functional mobility. Initiated 7/1/2017 and to be accomplished within 2 Week(s)    1. Patient will perform Upper body ADLs with/without adaptive equipment with minimal assistance/contact guard assist. (goal met-[facility] Supervision)  2. Patient will perform Lower body ADLs with/without adaptive equipment with moderate assistance. (progressing)  3. Patient will perform toileting task with moderate assistance with Fair safety to reduce falls risk. (progressing)  4. Patient will perform functional transfers with Leeroy Hasting and maximal assistance. (progressing)  5. Patient will perform standing static/dynamic balance activities for improved ADL/IADL function with moderate assistance and Fair balance and safety awarenes. (progressing)  6. Patient will improve Barthel index scores to atleast 45/100 to improve functional mobility. (progressing)    OCCUPATIONAL THERAPY LONG TERM GOALS   Initiated 7/1/2017 and to be accomplished within 4 Week(s)    1. Patient will perform Upper body ADLs with/without adaptive equipment with supervision/set-up.   2. Patient will perform Lower body ADLs with/without adaptive equipment with minimal assistance/contact guard assist.  3. Patient will perform toileting task with minimal assistance/contact guard assist with Good safety to reduce falls risk. 4. Patient will perform functional transfers with Rolling Walker and moderate assistance and Fair+ balance and safety awareness. 5. Patient will perform standing static/dynamic activity for improved ADL/IADL function with minimal assistance/contact guard assist an and Fair balance and safety awareness. 6. Patient will improve Barthel index score to 55/100 to improve independence with mobility. Therapist: MS VALENTINE Rust 7/1/2017   [facility]  OCCUPATIONAL THERAPY WEEKLY SUMMARY   //Reporting period:  from 7/6/17 through 7/14/17        Patient: Chiqui Wynn (24 y.o. male)                                 Date: 7/14/2017    Primary Diagnosis: Pneumonia                            Attending Physician: Onel Rousseau MD Treatment Diagnosis  Treatment Diagnosis: muscle weakness  Treatment Diagnosis 2: difficulty in walking  Precautions: Fall, Other (comment) (oxygen)  Rehab Potential : Fair     Skill interventions and education provided with clinical rationale (include individualized treatment techniques and standardized tests):  Skilled Occupational services were provided utilizing therapeutic exercises, therapeutic activities, functional mobility, functional activities, and EC/WS. Using a comparative statement, summarize significant progress toward goals as a result of skilled intervention provided:  Patient has made Fair progress towards their Occupational Therapy goals in the following areas: increased independence with upper body dressing. Patient has met 1/6 STGS and making slow progression towards LTGS.   Identify remaining functional areas, impairments limiting progress and/or barriers to improvement:  Patient would benefit from continued skilled Occupational Therapy Services to address the following functional deficits in decreased static and dynamic standing balance needed for ADLS. OBJECTIVE DATA SUMMARY:          INITIAL ASSESSMENT WEEKLY PROGRESS   COGNITIVE STATUS: COGNITIVE STATUS:   Neurologic State: Alert, Confused  Orientation Level: Oriented to person  Cognition: Follows commands  Perception: Appears intact  Perseveration: No perseveration noted  Safety/Judgement: Fall prevention Neurologic State: Confused  Orientation Level: Oriented to person  Cognition: Follows commands  Perception: Appears intact  Perseveration: No perseveration noted  Safety/Judgement: Fall prevention   PAIN: PAIN:   Pain Scale 1: Numeric (0 - 10)  Pain Intensity 1: 0  Patient Stated Pain Goal: 0  Pain Reassessment 1: Yes       Pain Scale 1: Numeric (0 - 10)  Pain Intensity 1: 0  Patient Stated Pain Goal: 0  Pain Reassessment 1: Yes   BED MOBILITY BED MOBILITY   Rolling: Stand-by asssistance  Supine to Sit: Minimum assistance  Sit to Supine: Stand-by asssistance  Scooting: Stand-by asssistance Rolling: Supervision  Supine to Sit: Stand-by asssistance  Sit to Supine: Stand-by asssistance  Scooting: Stand-by asssistance   ADL SELF CARE ADL SELF CARE   Feeding: Minimum assistance  Oral Facial Hygiene/Grooming: Minimum assistance  Bathing: Maximum assistance  Upper Body Dressing: Moderate assistance  Lower Body Dressing: Maximum assistance  Toileting: Maximum assistance (briefs)                               Dressing Assistance: Maximum assistance  Position Performed: Supine         TRANSFERS TRANSFERS   Sit to Stand:  Moderate assistance, Assist x2  Stand to Sit: Moderate assistance  Bed to Chair: Total assistance, Assist x2  Toilet Transfer :  (not assessed secondary to poor standing balance) Sit to Stand: Maximum assistance  Stand to Sit: Maximum assistance  Bed to Chair: Maximum assistance  Toilet Transfer :  (not assessed secondary to poor standing balance)   Toilet Transfer :  (not assessed secondary to poor standing balance) Toilet Transfer :  (not assessed secondary to poor standing balance)   BALANCE BALANCE   Sitting: Impaired, With support  Sitting - Static: Fair (occasional)  Sitting - Dynamic: Poor (constant support) (+)  Standing: Impaired, Pull to stand, With support  Standing - Static: Poor  Standing - Dynamic :  (not assessed) Sitting: With support  Sitting - Static: Fair (occasional)  Sitting - Dynamic: Fair (occasional)  Standing: Impaired, Pull to stand  Standing - Static: Poor  Standing - Dynamic : None         GROSS ASSESSMENT  GROSS ASSESSMENT   AROM: Generally decreased, functional  PROM: Generally decreased, functional  Strength: Generally decreased, functional (BLEs grossly 4/5)  Coordination: Generally decreased, functional  Tone: Normal  Sensation:  (unable to accurately assess; inconsistent responses) AROM: Generally decreased, functional (BUEs: approx 3/4 shoudler flexion)  PROM: Generally decreased, functional (BUEs)  Strength: Generally decreased, functional (BUEs: 3+/5)  Coordination: Generally decreased, functional (BUEs)  Tone: Normal (BUEs)  Sensation:  (unable to accurately assess; inconsistent responses)   COORDINATION COORDINATION   Fine Motor Skills-Upper: Right Intact, Left Intact  Gross Motor Skills-Upper: Right Intact, Left Intact Fine Motor Skills-Upper: Right Intact, Left Intact  Gross Motor Skills-Upper: Right Intact, Left Intact   VISUAL/PERCEPTUAL VISUAL/PERCEPTUAL   Acuity:  (unable to accurately assess)   Acuity:  (unable to accurately assess)     AUDITORY: AUDITORY:   Auditory Impairment: Hard of hearing, bilateral Auditory Impairment: None         INSTRUMENTAL  ADL'S:    INSTRUMENTAL ADL'S:            THE BARTHEL INDEX  ACTIVITY    SCORE   FEEDING  0=unable  5=needs help cutting,spreading butter,etc., or modified diet  10= independent    10   BATHING  0=dependent  5=independent (or in shower    0   GROOMING  0=needs help  5=independent face/hair/teeth/shaving (implements provided)    5 DRESSING  0=dependent  5=needs help but can do about half unaided  10=independent(including buttons, zips,laces etc.)    5   BOWELS  0=incontinent  5=occasional accident  10=continent    5   BLADDER  0=incontinent, or catheterized and unable to manage alone  5=occasional accident  10=continent    5   TOILET USE  0=dependent  5=needs some help, but can do something alone  10=independent (on and off, dressing, wiping)    5   TRANSFER (BED TO CHAIR AND BACK)  0=unable, no sitting balance  5=major help(one or two people,physical), can sit  10=minor help(verbal or physical)  15=independent    5   MOBILITY (ON LEVEL SURFACES)  0=immobile or <50 yards  5=wheelchair independent,including corners,>50 yards  10=walkes with help of one person (verbal or physical) >50 yards  15=independent(but may use any aid; for example, stick) >50 yards    0   STAIRS  0=unable  5=needs help (verbal, physical, carrying aid)  10=independent    0             TOTAL:                  40/100      Treatment:  Co-treated with PT for optimal functional gains with family training needed to safely complete ADL transfers and task. Practiced bed<> w/c training in order to increase safety and independence with patient's caregivers. Educated patient's family on proper body mechanics for optimal safety and prevention of body injury. Patient's response to Treatment rendered:  Patient is pleasant and receptive to therapist cueing. Patient expected Discharge Location:  [X ]Private Residence  [ ] [facility]  [ [facility]  [ ]Other:     Plan: Continue OT services as established on the Plan of Care for 3 times a week.      Treatment Minutes:  35  OT and Assistant have had a weekly case conference regarding the above treatment:  [ Hermelinda Spray Yes     [ ] No    Therapist:   Pta Moon,         Date:7/14/2017      Forward to OT for co-signature when completed

## 2017-07-14 NOTE — PROGRESS NOTES
Problem: Mobility Impaired (Adult and Pediatric)  Goal: *Acute Goals and Plan of Care (Insert Text)  PHYSICAL THERAPY STG GOALS :  Initiated 6/28/2017 and to be accomplished within 1-2 Weeks (updated 7/11/17)    1. Patient will move from supine to sit and sit to supine and roll side to side in bed with stand by assistance. (Achieved)   2. Patient will transfer from bed to chair and chair to bed with minimal assistance using RW.  (Maintaining at Mod A x2, Max A x1)  3. Patient will perform sit to stand with minimal assistance with Fair- balance and safety awareness. (Maintaining at; max A x 2)  4. Patient will ambulate with minimal assistance/contact guard assist for 10 feet with RW on level surfaces with 1 turns. (Pt unable due to poor endurance)  5. Patient will improve standardized test score for [place] Standing Balance Scale 1+. (Not progressed; 0)  6. Patient will demonstrate transfer from electric w/c <> car with minimal assistance. (Not addressed)    PHYSICAL THERAPY STG GOALS :  Initiated 6/28/2017 and to be accomplished within 1-2 Weeks (updated 7/4/17)    1. Patient will move from supine to sit and sit to supine and roll side to side in bed with stand by assistance. (Achieved)   2. Patient will transfer from bed to chair and chair to bed with minimal assistance using RW. (Progressing; mod A x 2)  3. Patient will perform sit to stand with minimal assistance with Fair- balance and safety awareness. (Progressing; mod A/max A x 2)  4. Patient will ambulate with minimal assistance/contact guard assist for 10 feet with RW on level surfaces with 1 turns. (Pt unable due to poor endurance)  5. Patient will improve standardized test score for [place] Standing Balance Scale 1+. (Not progressed; 0)  6. Patient will demonstrate transfer from electric w/c <> car with minimal assistance. (Not addressed)    PHYSICAL THERAPY LTG GOALS :  Initiated 6/28/2017 and to be accomplished within 3-4 Weeks    1. Patient will move from supine to sit and sit to supine and roll side to side in bed with supervision/set-up. 2. Patient will transfer from bed to chair and chair to bed with contact guard assist using RW. 3. Patient will perform sit to stand with contact guard assist with Fair balance and safety awareness. 4. Patient will ambulate with contact guard assist for 20 feet with RW on level surfaces with 2 turns. 5. Patient will improve standardized test score for [place] Standing Balance Scale 2.   6. Patient will demonstrate transfer from electric w/c <> car with contact guard assistance.      Physical Therapist: Julianne Castanon, PT on 6/28/2017    [facility] [place]   PHYSICAL THERAPY DAILY TREATMENT NOTE        Patient: Chiqui Wynn (68 y.o. male)               Date: 7/14/2017    Physician: Onel Rousseau MD  Primary Diagnosis: Pneumonia          Treatment Diagnosis  Treatment Diagnosis: muscle weakness  Treatment Diagnosis 2: difficulty in walking  Precautions: Fall, Other (comment) (oxygen)  Vital Signs  Vital Signs  Temp: 97.6 °F (36.4 °C)  Temp Source: Oral  Pulse (Heart Rate): (!) 57  Resp Rate: 20  O2 Sat (%): 98 %  Level of Consciousness: Alert  BP: 123/74  MAP (Calculated): 90  MEWS Score: 1     Cognitive Status:  Mental Status  Neurologic State: Confused  Orientation Level: Oriented to person  Cognition: Follows commands  Pain  Bed Mobility Training  Bed Mobility Training  Rolling: Supervision  Supine to Sit: Stand-by asssistance  Scooting: Stand-by asssistance  Balance  Sitting: With support  Sitting - Static: Fair (occasional)  Sitting - Dynamic: Fair (occasional)  Standing: Impaired;Pull to stand  Standing - Static: Poor  Standing - Dynamic : None  Transfer Training  Transfer Training  Sit to Stand: Maximum assistance  Stand to Sit: Maximum assistance  Stand Pivot Transfers: Maximum assistance  Bed to Chair: Maximum assistance  Interventions: Safety awareness training;Verbal cues  Sit to Stand: Maximum assistance  Gait Training  Therapeutic Exercise:  Partial co-treat with OT to allow for family training. Pt presented supine in bed. Bed mobility as note above. Pt's daughter, son-in-law and grandson present for family training. EOB<>w/c transfers training provided. Following training;  pt's Son-in-law and grandson demonstrated ability to complete SPT safety and correctly. Therapist educated on body mechanics, use of gait belt and hospital bed for reduced risk of falls and to minimize risk of injury to caregivers. Seated B LE TE's rendered to promote strength, endurance and flexibility for improved functional mobility: HR/TR, LAQ, hip flexion, resisted hip abd (yellow band), ball squeezes 2x10. Seated dynamic balance activities provided to build core control/strnegth and promote balance for ease of transfers. Sp02 assessed throughout session and noted at 88-89% with activity. Pt instructed on pursed lip breathing techniques. Patient/Caregiver Education:   Pt /Caregiver Education on safety and fall prevention, transfer training was provided to reduce risk of falls. ASSESSMENT:  Patient continues to benefit from Skilled PT services for 4-5 additional session with DC planned for 7/19/17  Progression toward goals:  [ ]      Improving appropriately and progressing toward goals  [ ]      Improving slowly and progressing toward goals  [X]      Not making progress toward goals and plan of care will be adjusted      Treatment session: 50 minutes.   Therapist:   Melida Garcia PTA,          7/14/2017

## 2017-07-14 NOTE — ROUTINE PROCESS
Bedside and Verbal shift change report given to Eduardo Aguilera (oncoming nurse) by Hira Franco RN (offgoing nurse). Report included the following information SBAR, Kardex and MAR. QH ROUNDS DONE.

## 2017-07-15 PROCEDURE — 74011000250 HC RX REV CODE- 250: Performed by: INTERNAL MEDICINE

## 2017-07-15 PROCEDURE — 74011636637 HC RX REV CODE- 636/637: Performed by: INTERNAL MEDICINE

## 2017-07-15 PROCEDURE — 74011250637 HC RX REV CODE- 250/637: Performed by: INTERNAL MEDICINE

## 2017-07-15 RX ADMIN — DILTIAZEM HYDROCHLORIDE 180 MG: 180 CAPSULE, COATED, EXTENDED RELEASE ORAL at 08:39

## 2017-07-15 RX ADMIN — ARFORMOTEROL TARTRATE 15 MCG: 15 SOLUTION RESPIRATORY (INHALATION) at 08:36

## 2017-07-15 RX ADMIN — DOCUSATE SODIUM 100 MG: 100 CAPSULE, LIQUID FILLED ORAL at 08:39

## 2017-07-15 RX ADMIN — DOCUSATE SODIUM 100 MG: 100 CAPSULE, LIQUID FILLED ORAL at 17:37

## 2017-07-15 RX ADMIN — LEVOTHYROXINE SODIUM 50 MCG: 50 TABLET ORAL at 08:39

## 2017-07-15 RX ADMIN — BUDESONIDE 500 MCG: 0.5 INHALANT RESPIRATORY (INHALATION) at 21:04

## 2017-07-15 RX ADMIN — FINASTERIDE 5 MG: 5 TABLET, FILM COATED ORAL at 08:39

## 2017-07-15 RX ADMIN — ASPIRIN 81 MG 81 MG: 81 TABLET ORAL at 08:39

## 2017-07-15 RX ADMIN — MIRTAZAPINE 15 MG: 15 TABLET, FILM COATED ORAL at 21:04

## 2017-07-15 RX ADMIN — QUETIAPINE FUMARATE 25 MG: 25 TABLET, FILM COATED ORAL at 21:04

## 2017-07-15 RX ADMIN — RANITIDINE 150 MG: 150 TABLET ORAL at 17:37

## 2017-07-15 RX ADMIN — RANITIDINE 150 MG: 150 TABLET ORAL at 08:39

## 2017-07-15 RX ADMIN — LACTULOSE 20 G: 10 SOLUTION ORAL at 17:37

## 2017-07-15 RX ADMIN — FUROSEMIDE 40 MG: 40 TABLET ORAL at 17:37

## 2017-07-15 RX ADMIN — IPRATROPIUM BROMIDE AND ALBUTEROL SULFATE 3 ML: .5; 3 SOLUTION RESPIRATORY (INHALATION) at 14:47

## 2017-07-15 RX ADMIN — TAMSULOSIN HYDROCHLORIDE 0.4 MG: 0.4 CAPSULE ORAL at 08:39

## 2017-07-15 RX ADMIN — CLOPIDOGREL BISULFATE 75 MG: 75 TABLET, FILM COATED ORAL at 08:39

## 2017-07-15 RX ADMIN — ARFORMOTEROL TARTRATE 15 MCG: 15 SOLUTION RESPIRATORY (INHALATION) at 21:04

## 2017-07-15 RX ADMIN — BUDESONIDE 500 MCG: 0.5 INHALANT RESPIRATORY (INHALATION) at 08:36

## 2017-07-15 RX ADMIN — LACTULOSE 20 G: 10 SOLUTION ORAL at 08:39

## 2017-07-15 RX ADMIN — PREDNISONE 7.5 MG: 5 TABLET ORAL at 08:39

## 2017-07-15 RX ADMIN — FUROSEMIDE 40 MG: 40 TABLET ORAL at 08:39

## 2017-07-15 NOTE — ROUTINE PROCESS
Bedside and Verbal shift change report given to SUDHEER Cheney LPN (oncoming nurse) by ZAYRA Menchaca LPN (offgoing nurse). Report included the following information SBAR, Kardex and MAR. Patient was visual checked and repositioned per facility protocol.

## 2017-07-15 NOTE — PROGRESS NOTES
Problem: Mobility Impaired (Adult and Pediatric)  Goal: *Acute Goals and Plan of Care (Insert Text)  PHYSICAL THERAPY STG GOALS :  Revised on 7/15/2017 and to be accomplished within 1-2 Weeks     1. Patient will move from supine to sit and sit to supine and roll side to side in bed with stand by assistance. 2. Patient will transfer from bed to chair and chair to bed with moderate assistance using stand/squat pivot transfer. 3. Patient will perform sit to stand with moderate assistance with Fair- balance and safety awareness. 4. Patient will improve standardized test score for [place] Standing Balance Scale 1. PHYSICAL THERAPY STG GOALS :  Initiated 6/28/2017 and to be accomplished within 1-2 Weeks (updated 7/11/17)    1. Patient will move from supine to sit and sit to supine and roll side to side in bed with stand by assistance. (Achieved)   2. Patient will transfer from bed to chair and chair to bed with minimal assistance using RW.  (Maintaining at Mod A x2, Max A x1)  3. Patient will perform sit to stand with minimal assistance with Fair- balance and safety awareness. (Maintaining at; max A x 2)  4. Patient will ambulate with minimal assistance/contact guard assist for 10 feet with RW on level surfaces with 1 turns. (Pt unable due to poor endurance)  5. Patient will improve standardized test score for [place] Standing Balance Scale 1+. (Not progressed; 0)  6. Patient will demonstrate transfer from electric w/c <> car with minimal assistance. (Not addressed)    PHYSICAL THERAPY STG GOALS :  Initiated 6/28/2017 and to be accomplished within 1-2 Weeks (updated 7/4/17)    1. Patient will move from supine to sit and sit to supine and roll side to side in bed with stand by assistance. (Achieved)   2. Patient will transfer from bed to chair and chair to bed with minimal assistance using RW. (Progressing; mod A x 2)  3.  Patient will perform sit to stand with minimal assistance with Fair- balance and safety awareness. (Progressing; mod A/max A x 2)  4. Patient will ambulate with minimal assistance/contact guard assist for 10 feet with RW on level surfaces with 1 turns. (Pt unable due to poor endurance)  5. Patient will improve standardized test score for [place] Standing Balance Scale 1+. (Not progressed; 0)  6. Patient will demonstrate transfer from electric w/c <> car with minimal assistance. (Not addressed)    PHYSICAL THERAPY LTG GOALS :  Initiated 6/28/2017 and to be accomplished within 3-4 Weeks    1. Patient will move from supine to sit and sit to supine and roll side to side in bed with supervision/set-up. 2. Patient will transfer from bed to chair and chair to bed with contact guard assist using RW. 3. Patient will perform sit to stand with contact guard assist with Fair balance and safety awareness. 4. Patient will ambulate with contact guard assist for 20 feet with RW on level surfaces with 2 turns. 5. Patient will improve standardized test score for [place] Standing Balance Scale 2.   6. Patient will demonstrate transfer from electric w/c <> car with contact guard assistance. Physical Therapist: Joey Byrd, PT on 6/28/2017    [facility]   PHYSICAL THERAPY DAILY TREATMENT NOTE        Patient: Kevin Tai (34 y.o. male)               Date: 7/15/2017    Physician: Magaly Miller MD  Primary Diagnosis: Pneumonia          Treatment Diagnosis  Treatment Diagnosis: muscle weakness  Treatment Diagnosis 2: difficulty in walking  Precautions: Fall, Other (comment) (oxygen)  Vital Signs  Vital Signs  Pulse (Heart Rate): 75  BP: 138/53     Cognitive Status:     Pain     Bed Mobility Training     Balance  Sitting: Impaired; With support  Sitting - Static: Fair (occasional)  Sitting - Dynamic: Fair (occasional)  Standing: Impaired;Pull to stand; With support  Standing - Static: Poor  Transfer Training  Transfer Training  Sit to Stand:  Moderate assistance  Stand to Sit: Minimum assistance  Sit to Stand: Moderate assistance  Gait Training                      Therapeutic Exercise:    Sit <> stand transfer training performed inside // bars using pull to stand with verbal cueing for proper technique, required mod A. Static standing balance to improve standing tolerance and endurance x 11 sec, 15 sec, attempted 3rd standing activities, but pt unable to tolerate. TE rendered to include heel raises, toe raises x 10 reps with visual cueing to address endurance and mobility. Extensive rest breaks required during each stand to address SOB on 5 L of O2, educated on pursed lip breathing to address very labored breathing. Patient/Caregiver Education:   Pt /Caregiver Education on safety and fall prevention to reduce fall risk. Education rendered on benefits of compliance with HEP to improve endurance and activity tolerance. ASSESSMENT:  Patient continues to benefit from Skilled PT services to improve strength, endurance, mobility. Progression toward goals:  [ ]      Improving appropriately and progressing toward goals  [ ]      Improving slowly and progressing toward goals  [X]      Not making progress toward goals and plan of care will be adjusted  Goals revised due to lack of progression towards established goals, d/c date est for 7/19, family meeting has been conducted. Treatment session: 40 minutes.   Therapist:   Ladonna Carlton PT,          7/15/2017

## 2017-07-15 NOTE — ROUTINE PROCESS
Bedside and Verbal shift change report given to A Nikolai LPN (oncoming nurse) by Zayra Bernal RN (offgoing nurse). Report included the following information SBAR, Kardex and MAR.  24 hour chart check completed, pt visually checked q 1 hour by nursing staff

## 2017-07-16 PROCEDURE — 74011000250 HC RX REV CODE- 250: Performed by: INTERNAL MEDICINE

## 2017-07-16 PROCEDURE — 77030029684 HC NEB SM VOL KT MONA -A

## 2017-07-16 PROCEDURE — 74011250637 HC RX REV CODE- 250/637: Performed by: INTERNAL MEDICINE

## 2017-07-16 PROCEDURE — 74011636637 HC RX REV CODE- 636/637: Performed by: INTERNAL MEDICINE

## 2017-07-16 RX ADMIN — FINASTERIDE 5 MG: 5 TABLET, FILM COATED ORAL at 08:49

## 2017-07-16 RX ADMIN — MIRTAZAPINE 15 MG: 15 TABLET, FILM COATED ORAL at 21:04

## 2017-07-16 RX ADMIN — ASPIRIN 81 MG 81 MG: 81 TABLET ORAL at 08:50

## 2017-07-16 RX ADMIN — FUROSEMIDE 40 MG: 40 TABLET ORAL at 08:50

## 2017-07-16 RX ADMIN — BUDESONIDE 500 MCG: 0.5 INHALANT RESPIRATORY (INHALATION) at 08:47

## 2017-07-16 RX ADMIN — RANITIDINE 150 MG: 150 TABLET ORAL at 17:29

## 2017-07-16 RX ADMIN — DILTIAZEM HYDROCHLORIDE 180 MG: 180 CAPSULE, COATED, EXTENDED RELEASE ORAL at 08:49

## 2017-07-16 RX ADMIN — RANITIDINE 150 MG: 150 TABLET ORAL at 08:50

## 2017-07-16 RX ADMIN — DOCUSATE SODIUM 100 MG: 100 CAPSULE, LIQUID FILLED ORAL at 08:49

## 2017-07-16 RX ADMIN — CLOPIDOGREL BISULFATE 75 MG: 75 TABLET, FILM COATED ORAL at 08:50

## 2017-07-16 RX ADMIN — DOCUSATE SODIUM 100 MG: 100 CAPSULE, LIQUID FILLED ORAL at 17:29

## 2017-07-16 RX ADMIN — IPRATROPIUM BROMIDE AND ALBUTEROL SULFATE 3 ML: .5; 3 SOLUTION RESPIRATORY (INHALATION) at 16:37

## 2017-07-16 RX ADMIN — BUDESONIDE 500 MCG: 0.5 INHALANT RESPIRATORY (INHALATION) at 20:09

## 2017-07-16 RX ADMIN — ARFORMOTEROL TARTRATE 15 MCG: 15 SOLUTION RESPIRATORY (INHALATION) at 20:09

## 2017-07-16 RX ADMIN — LACTULOSE 20 G: 10 SOLUTION ORAL at 17:29

## 2017-07-16 RX ADMIN — LACTULOSE 20 G: 10 SOLUTION ORAL at 08:50

## 2017-07-16 RX ADMIN — QUETIAPINE FUMARATE 25 MG: 25 TABLET, FILM COATED ORAL at 21:04

## 2017-07-16 RX ADMIN — PREDNISONE 7.5 MG: 5 TABLET ORAL at 08:50

## 2017-07-16 RX ADMIN — TAMSULOSIN HYDROCHLORIDE 0.4 MG: 0.4 CAPSULE ORAL at 08:50

## 2017-07-16 RX ADMIN — ARFORMOTEROL TARTRATE 15 MCG: 15 SOLUTION RESPIRATORY (INHALATION) at 08:47

## 2017-07-16 RX ADMIN — LEVOTHYROXINE SODIUM 50 MCG: 50 TABLET ORAL at 08:50

## 2017-07-16 RX ADMIN — FUROSEMIDE 40 MG: 40 TABLET ORAL at 16:37

## 2017-07-16 NOTE — ROUTINE PROCESS
Bedside and verbal shift report given to IFRAH Gunn RN (oncoming nurse) by SUDHEER Stewart LPN (off going nurse). Report included SBAR, MAR and Kardex. Hourly rounds complete.

## 2017-07-16 NOTE — ROUTINE PROCESS
Bedside and Verbal shift change report given to ZAYRA Menchaca LPN (oncoming nurse) by Eusebai Bryant RN (offgoing nurse). Report included the following information SBAR, Kardex and MAR. Hourly rounds made & 24 hour chart completed.

## 2017-07-16 NOTE — PROGRESS NOTES
Problem: Mobility Impaired (Adult and Pediatric)  Goal: *Acute Goals and Plan of Care (Insert Text)  PHYSICAL THERAPY STG GOALS :  Revised on 7/15/2017 and to be accomplished within 1-2 Weeks     1. Patient will move from supine to sit and sit to supine and roll side to side in bed with stand by assistance. 2. Patient will transfer from bed to chair and chair to bed with moderate assistance using stand/squat pivot transfer. 3. Patient will perform sit to stand with moderate assistance with Fair- balance and safety awareness. 4. Patient will improve standardized test score for [place] Standing Balance Scale 1. PHYSICAL THERAPY STG GOALS :  Initiated 6/28/2017 and to be accomplished within 1-2 Weeks (updated 7/11/17)    1. Patient will move from supine to sit and sit to supine and roll side to side in bed with stand by assistance. (Achieved)   2. Patient will transfer from bed to chair and chair to bed with minimal assistance using RW.  (Maintaining at Mod A x2, Max A x1)  3. Patient will perform sit to stand with minimal assistance with Fair- balance and safety awareness. (Maintaining at; max A x 2)  4. Patient will ambulate with minimal assistance/contact guard assist for 10 feet with RW on level surfaces with 1 turns. (Pt unable due to poor endurance)  5. Patient will improve standardized test score for [place] Standing Balance Scale 1+. (Not progressed; 0)  6. Patient will demonstrate transfer from electric w/c <> car with minimal assistance. (Not addressed)    PHYSICAL THERAPY STG GOALS :  Initiated 6/28/2017 and to be accomplished within 1-2 Weeks (updated 7/4/17)    1. Patient will move from supine to sit and sit to supine and roll side to side in bed with stand by assistance. (Achieved)   2. Patient will transfer from bed to chair and chair to bed with minimal assistance using RW. (Progressing; mod A x 2)  3.  Patient will perform sit to stand with minimal assistance with Fair- balance and safety awareness. (Progressing; mod A/max A x 2)  4. Patient will ambulate with minimal assistance/contact guard assist for 10 feet with RW on level surfaces with 1 turns. (Pt unable due to poor endurance)  5. Patient will improve standardized test score for [place] Standing Balance Scale 1+. (Not progressed; 0)  6. Patient will demonstrate transfer from electric w/c <> car with minimal assistance. (Not addressed)    PHYSICAL THERAPY LTG GOALS :  Initiated 6/28/2017 and to be accomplished within 3-4 Weeks    1. Patient will move from supine to sit and sit to supine and roll side to side in bed with supervision/set-up. 2. Patient will transfer from bed to chair and chair to bed with contact guard assist using RW. 3. Patient will perform sit to stand with contact guard assist with Fair balance and safety awareness. 4. Patient will ambulate with contact guard assist for 20 feet with RW on level surfaces with 2 turns. 5. Patient will improve standardized test score for [place] Standing Balance Scale 2.   6. Patient will demonstrate transfer from electric w/c <> car with contact guard assistance.      Physical Therapist: Kate Molina, PT on 6/28/2017    [facility]   PHYSICAL THERAPY DAILY TREATMENT NOTE        Patient: Jean-Pierre Hartman (51 y.o. male)               Date: 7/16/2017    Physician: Anu Haskins MD  Primary Diagnosis: Pneumonia          Treatment Diagnosis  Treatment Diagnosis: muscle weakness  Treatment Diagnosis 2: difficulty in walking  Precautions: Fall, Other (comment) (oxygen)  Vital Signs  Vital Signs  Temp: 97.9 °F (36.6 °C)  Temp Source: Oral  Pulse (Heart Rate): 84  Heart Rate Source: Monitor  Resp Rate: 16  O2 Sat (%): 98 %  Level of Consciousness: Alert  BP: 107/64  MAP (Calculated): 78  BP 1 Method: Automatic  BP 1 Location: Right arm  BP Patient Position: At rest  MEWS Score: 1     Cognitive Status:  Mental Status  Neurologic State: Confused  Orientation Level: Oriented to person  Cognition: Follows commands  Pain  Pain Screen  Pain Scale 1: Numeric (0 - 10)  Pain Intensity 1: 0  Patient Stated Pain Goal: 0  Bed Mobility Training     Balance  Sitting: With support  Sitting - Static: Fair (occasional)  Sitting - Dynamic: Fair (occasional)  Standing: Impaired;Pull to stand  Standing - Static: Poor  Transfer Training  Transfer Training  Sit to Stand: Moderate assistance  Stand to Sit: Moderate assistance  Sit to Stand: Moderate assistance  Gait Training                     With 0 turns. Therapeutic Exercise:    pt performed LE TE for strengthening and mobility: heel/toe raises, LAQs, marches, hip add/abd with resistance 2x15 each. Pt required frequent, extended rest breaks due to shortness of breath. Pt performed sit<>stand transfers in // bars with mod A 2 x 10 sec stand. Required extended seated rest break between standing. Pt educated on proper transfer technique, and pursed lip breathing during recovery. Per pt request the session was ended early, and pt was returned back to room. Pt presents with low activity tolerance. Patient/Caregiver Education:   Pt /Caregiver Education on safety and fall prevention,  Transfer training was provided to reduce risk for fall. .       ASSESSMENT:  Patient continues to benefit from Skilled PT services to improve overall strength and functional mobility. Progression toward goals:  [X]      Improving appropriately and progressing toward goals  [ ]      Improving slowly and progressing toward goals  [ ]      Not making progress toward goals and plan of care will be adjusted      Treatment session: 32 minutes.   Therapist:   Dior Terrell PTA,          7/16/2017

## 2017-07-16 NOTE — ROUTINE PROCESS
While laying in bed, nurse observed patients left great toe slightly bent. Patient able to move on command with out any facial grimacing. Nurse reminded patient to continue to move lower extremities and toes while laying in bed. Patient nodded and repeated demonstration.

## 2017-07-17 PROCEDURE — 74011636637 HC RX REV CODE- 636/637: Performed by: INTERNAL MEDICINE

## 2017-07-17 PROCEDURE — 74011000250 HC RX REV CODE- 250: Performed by: INTERNAL MEDICINE

## 2017-07-17 PROCEDURE — 74011250637 HC RX REV CODE- 250/637: Performed by: INTERNAL MEDICINE

## 2017-07-17 RX ADMIN — DOCUSATE SODIUM 100 MG: 100 CAPSULE, LIQUID FILLED ORAL at 09:00

## 2017-07-17 RX ADMIN — DOCUSATE SODIUM 100 MG: 100 CAPSULE, LIQUID FILLED ORAL at 17:01

## 2017-07-17 RX ADMIN — CLOPIDOGREL BISULFATE 75 MG: 75 TABLET, FILM COATED ORAL at 08:01

## 2017-07-17 RX ADMIN — QUETIAPINE FUMARATE 25 MG: 25 TABLET, FILM COATED ORAL at 21:30

## 2017-07-17 RX ADMIN — ARFORMOTEROL TARTRATE 15 MCG: 15 SOLUTION RESPIRATORY (INHALATION) at 20:46

## 2017-07-17 RX ADMIN — TAMSULOSIN HYDROCHLORIDE 0.4 MG: 0.4 CAPSULE ORAL at 09:00

## 2017-07-17 RX ADMIN — LACTULOSE 20 G: 10 SOLUTION ORAL at 17:02

## 2017-07-17 RX ADMIN — BUDESONIDE 500 MCG: 0.5 INHALANT RESPIRATORY (INHALATION) at 20:46

## 2017-07-17 RX ADMIN — ASPIRIN 81 MG 81 MG: 81 TABLET ORAL at 08:01

## 2017-07-17 RX ADMIN — BUDESONIDE 500 MCG: 0.5 INHALANT RESPIRATORY (INHALATION) at 07:56

## 2017-07-17 RX ADMIN — FUROSEMIDE 40 MG: 40 TABLET ORAL at 07:55

## 2017-07-17 RX ADMIN — FINASTERIDE 5 MG: 5 TABLET, FILM COATED ORAL at 09:00

## 2017-07-17 RX ADMIN — MIRTAZAPINE 15 MG: 15 TABLET, FILM COATED ORAL at 21:30

## 2017-07-17 RX ADMIN — FUROSEMIDE 40 MG: 40 TABLET ORAL at 17:00

## 2017-07-17 RX ADMIN — RANITIDINE 150 MG: 150 TABLET ORAL at 17:01

## 2017-07-17 RX ADMIN — DILTIAZEM HYDROCHLORIDE 180 MG: 180 CAPSULE, COATED, EXTENDED RELEASE ORAL at 08:01

## 2017-07-17 RX ADMIN — PREDNISONE 7.5 MG: 5 TABLET ORAL at 07:57

## 2017-07-17 RX ADMIN — RANITIDINE 150 MG: 150 TABLET ORAL at 08:01

## 2017-07-17 RX ADMIN — LEVOTHYROXINE SODIUM 50 MCG: 50 TABLET ORAL at 07:30

## 2017-07-17 RX ADMIN — LACTULOSE 20 G: 10 SOLUTION ORAL at 08:00

## 2017-07-17 NOTE — PROGRESS NOTES
BAILEY faxed ride request to [phone] [facility] per daughters request for pt's d/c home on 7/19/17. Pt's daughter will bring in w/c that was ordered by BAILEY last week. Pt's daughter indicated that dme provider called her and the w/c and hospital bed will be delivered tomorrow.

## 2017-07-17 NOTE — PROGRESS NOTES
Problem: Self Care Deficits Care Plan (Adult)  Goal: *Acute Goals and Plan of Care (Insert Text)  OCCUPATIONAL THERAPY SHORT TERM GOALS     Updated 7/14/17    1. Patient will perform Upper body ADLs with/without adaptive equipment with Supervision. 2. Patient will perform Lower body ADLs with/without adaptive equipment with moderate assistance. 3. Patient will perform toileting task with moderate assistance with Fair safety to reduce falls risk. 4. Patient will perform functional transfers with [address] and maximal assistance. 5. Patient will perform standing static/dynamic balance activities for improved ADL/IADL function with moderate assistance and Fair balance and safety awarenes. 6. Patient will improve Barthel index scores to atleast 45/100 to improve functional mobility. Initiated 7/1/2017 and to be accomplished within 2 Week(s)    1. Patient will perform Upper body ADLs with/without adaptive equipment with minimal assistance/contact guard assist. (goal met-[facility] Supervision)  2. Patient will perform Lower body ADLs with/without adaptive equipment with moderate assistance. (progressing)  3. Patient will perform toileting task with moderate assistance with Fair safety to reduce falls risk. (progressing)  4. Patient will perform functional transfers with [address] and maximal assistance. (progressing)  5. Patient will perform standing static/dynamic balance activities for improved ADL/IADL function with moderate assistance and Fair balance and safety awarenes. (progressing)  6. Patient will improve Barthel index scores to atleast 45/100 to improve functional mobility. (progressing)    OCCUPATIONAL THERAPY LONG TERM GOALS   Initiated 7/1/2017 and to be accomplished within 4 Week(s)    1. Patient will perform Upper body ADLs with/without adaptive equipment with supervision/set-up.   2. Patient will perform Lower body ADLs with/without adaptive equipment with minimal assistance/contact guard assist.  3. Patient will perform toileting task with minimal assistance/contact guard assist with Good safety to reduce falls risk. 4. Patient will perform functional transfers with Rolling Walker and moderate assistance and Fair+ balance and safety awareness. 5. Patient will perform standing static/dynamic activity for improved ADL/IADL function with minimal assistance/contact guard assist an and Fair balance and safety awareness. 6. Patient will improve Barthel index score to 55/100 to improve independence with mobility. Therapist: MS VALENTINE Rust 7/1/2017   [facility]   OCCUPATIONAL THERAPY DAILY TREATMENT NOTE        Patient: Chiqui Wynn (64 y.o. male)                          Date: 7/17/2017  Attending Physician: Onel Rousseau MD  Primary Diagnosis: Pneumonia    Treatment Diagnosis  Treatment Diagnosis: muscle weakness  Treatment Diagnosis 2: difficulty in walking   Precautions : Precautions at Admission: Fall, Other (comment) (oxygen)  Vital Signs:  Vital Signs  Temp: 98 °F (36.7 °C)  Temp Source: Oral  Pulse (Heart Rate): 77  Heart Rate Source: Monitor  Resp Rate: 18  O2 Sat (%): 99 %  Level of Consciousness: Alert  BP: 124/64  MAP (Monitor): 73  BP 1 Method: Automatic  BP 1 Location: Right arm  BP Patient Position: Sitting  MEWS Score: 1  Electrodes Replaced: No     Cognitive Status:  Mental Status  Neurologic State: Confused  Orientation Level: Oriented to person  Cognition: Follows commands  Pain:  Pain Screen  Pain Scale 1: Numeric (0 - 10)  Pain Intensity 1: 0  Patient Stated Pain Goal: 0  Pain Reassessment 1: Yes  Pain Scale 1: Numeric (0 - 10)  Balance:     ADL Self Care: Therapeutic Activities:  Patient informed THAKUR he would like to return to bed upon THAKUR's arrival. THAKUR discussed with patient previous roles and routine at home as well as educated patient on importance of OOB activity for optimal strengthening. Patient verbalized understanding. Practiced static standing activity within parellel bars in order to increase standing balance and tolerance needed for ADLS. Patient was able to tolerate standing for 6 sec with Max A prior to requesting to sit. Therapeutic Exercises:  UB strengthening with 3#, 2 sets x 20 reps in order to increase functional activity tolerance and UB muscle strength needed for ADLS. Cueing needed for patient to self pace and pursued lip breathing during exercises   Patient/Caregiver Education:    Pt. Thee [employer] Education on see above.         ASSESSMENT:  Patient continues to demonstrate the need for skilled Occupational Therapy services to improve static standing balance needed for bathing  Progression toward goals:  [ ]      Improving appropriately and progressing toward goals  [X]      Improving slowly and progressing toward goals  [ ]      Not making progress toward goals and plan of care will be adjusted      Treatment session:   37 minutes     Therapist:    OFE Jackson,  7/17/2017

## 2017-07-17 NOTE — PROGRESS NOTES
conducted a Follow up consultation and Spiritual Assessment for Jenny Geronimo, who is a 80 y. o.,male. The  provided the following Interventions:  Continued the relationship of care and support. Listened empathically. Offered prayer and assurance of continued prayer on patients behalf. Chart reviewed. The following outcomes were achieved:  Patient expressed gratitude for 's visit. Assessment:  There are no spiritual or [employer] issues which require intervention at this time. Plan:  Chaplains will continue to follow and will provide pastoral care on an as needed/requested basis.  recommends bedside caregivers page  on duty if patient shows signs of acute spiritual or emotional distress. FARSHAD TranDiv.   [facility] [address]  [phone]

## 2017-07-17 NOTE — PROGRESS NOTES
Problem: Mobility Impaired (Adult and Pediatric)  Goal: *Acute Goals and Plan of Care (Insert Text)  PHYSICAL THERAPY STG GOALS :  Revised on 7/15/2017 and to be accomplished within 1-2 Weeks     1. Patient will move from supine to sit and sit to supine and roll side to side in bed with stand by assistance. 2. Patient will transfer from bed to chair and chair to bed with moderate assistance using stand/squat pivot transfer. 3. Patient will perform sit to stand with moderate assistance with Fair- balance and safety awareness. 4. Patient will improve standardized test score for [place] Standing Balance Scale 1. PHYSICAL THERAPY STG GOALS :  Initiated 6/28/2017 and to be accomplished within 1-2 Weeks (updated 7/11/17)    1. Patient will move from supine to sit and sit to supine and roll side to side in bed with stand by assistance. (Achieved)   2. Patient will transfer from bed to chair and chair to bed with minimal assistance using RW.  (Maintaining at Mod A x2, Max A x1)  3. Patient will perform sit to stand with minimal assistance with Fair- balance and safety awareness. (Maintaining at; max A x 2)  4. Patient will ambulate with minimal assistance/contact guard assist for 10 feet with RW on level surfaces with 1 turns. (Pt unable due to poor endurance)  5. Patient will improve standardized test score for [place] Standing Balance Scale 1+. (Not progressed; 0)  6. Patient will demonstrate transfer from electric w/c <> car with minimal assistance. (Not addressed)    PHYSICAL THERAPY STG GOALS :  Initiated 6/28/2017 and to be accomplished within 1-2 Weeks (updated 7/4/17)    1. Patient will move from supine to sit and sit to supine and roll side to side in bed with stand by assistance. (Achieved)   2. Patient will transfer from bed to chair and chair to bed with minimal assistance using RW. (Progressing; mod A x 2)  3.  Patient will perform sit to stand with minimal assistance with Fair- balance and safety awareness. (Progressing; mod A/max A x 2)  4. Patient will ambulate with minimal assistance/contact guard assist for 10 feet with RW on level surfaces with 1 turns. (Pt unable due to poor endurance)  5. Patient will improve standardized test score for [place] Standing Balance Scale 1+. (Not progressed; 0)  6. Patient will demonstrate transfer from electric w/c <> car with minimal assistance. (Not addressed)    PHYSICAL THERAPY LTG GOALS :  Initiated 6/28/2017 and to be accomplished within 3-4 Weeks    1. Patient will move from supine to sit and sit to supine and roll side to side in bed with supervision/set-up. 2. Patient will transfer from bed to chair and chair to bed with contact guard assist using RW. 3. Patient will perform sit to stand with contact guard assist with Fair balance and safety awareness. 4. Patient will ambulate with contact guard assist for 20 feet with RW on level surfaces with 2 turns. 5. Patient will improve standardized test score for [place] Standing Balance Scale 2.   6. Patient will demonstrate transfer from electric w/c <> car with contact guard assistance.      Physical Therapist: Kate Leija, PT on 6/28/2017    [facility]   PHYSICAL THERAPY DAILY TREATMENT NOTE        Patient: Kaitlin Edgar (53 y.o. male)               Date: 7/17/2017    Physician: Nikok Jean MD  Primary Diagnosis: Pneumonia          Treatment Diagnosis  Treatment Diagnosis: muscle weakness  Treatment Diagnosis 2: difficulty in walking  Precautions: Fall, Other (comment) (oxygen)  Vital Signs  Vital Signs  Temp: 98 °F (36.7 °C)  Temp Source: Oral  Pulse (Heart Rate): 77  Heart Rate Source: Monitor  Resp Rate: 18  O2 Sat (%): 99 %  Level of Consciousness: Alert  BP: 124/64  MAP (Monitor): 73  BP 1 Method: Automatic  BP 1 Location: Right arm  BP Patient Position: Sitting  MEWS Score: 1  Electrodes Replaced: No     Cognitive Status:     Pain  Pain Screen  Pain Scale 1: Numeric (0 - 10)  Pain Intensity 1: 0  Patient Stated Pain Goal: 0  Pain Reassessment 1: Yes  Bed Mobility Training     Balance  Sitting: With support  Sitting - Static: Fair (occasional)  Sitting - Dynamic: Fair (occasional)  Standing: Pull to stand; Impaired; With support  Standing - Static: Poor  Transfer Training  Transfer Training  Sit to Stand: Moderate assistance  Stand to Sit: Maximum assistance  Sit to Stand: Moderate assistance  Gait Training                      Therapeutic Exercise:    Sit <> stand transfer training within // bars x 2 reps, pt unable to stand for any longer than 8 seconds either occasion, also unable to achieve full standing posture. TE rendered to improve endurance, strength, and mobility and included: heel raises, toe raises, marching, LAQ, resisted hip abd using tan t-band, resisted hip add 15 reps x 2 sets with extensive rest breaks to address sats that would drop to 86%, heart rate to elevated up to 127 bpm, and SOB. Pursed lip breathing education rendered for improved sats on 4L of O2. Patient/Caregiver Education:   Pt /Caregiver Education on safety and fall prevention to reduce fall risk. ASSESSMENT:  Patient continues to benefit from Skilled PT services to improve strength, endurance, gait, balance. Progression toward goals:  [ ]      Improving appropriately and progressing toward goals  [X]      Improving slowly and progressing toward goals  [ ]      Not making progress toward goals and plan of care will be adjusted      Treatment session: 55 minutes.   Therapist:   Geoff Beltran, PT,          7/17/2017

## 2017-07-17 NOTE — PROGRESS NOTES
I have reviewed this patient's current medication list and recent laboratory results. At this time, I do not suggest any drug therapy adjustments or additional laboratory monitoring. Thank you,  John RG  Ph. M. S.  7/17/2017

## 2017-07-18 PROCEDURE — 74011250637 HC RX REV CODE- 250/637: Performed by: INTERNAL MEDICINE

## 2017-07-18 PROCEDURE — 74011000250 HC RX REV CODE- 250: Performed by: INTERNAL MEDICINE

## 2017-07-18 PROCEDURE — 74011636637 HC RX REV CODE- 636/637: Performed by: INTERNAL MEDICINE

## 2017-07-18 RX ORDER — PREDNISONE 2.5 MG/1
7.5 TABLET ORAL
Qty: 10 TAB | Refills: 0 | Status: SHIPPED
Start: 2017-07-18

## 2017-07-18 RX ORDER — QUETIAPINE FUMARATE 25 MG/1
25 TABLET, FILM COATED ORAL
Qty: 30 TAB | Refills: 1 | Status: SHIPPED | OUTPATIENT
Start: 2017-07-18 | End: 2017-11-17

## 2017-07-18 RX ADMIN — TAMSULOSIN HYDROCHLORIDE 0.4 MG: 0.4 CAPSULE ORAL at 08:15

## 2017-07-18 RX ADMIN — FINASTERIDE 5 MG: 5 TABLET, FILM COATED ORAL at 08:14

## 2017-07-18 RX ADMIN — ARFORMOTEROL TARTRATE 15 MCG: 15 SOLUTION RESPIRATORY (INHALATION) at 21:21

## 2017-07-18 RX ADMIN — DILTIAZEM HYDROCHLORIDE 180 MG: 180 CAPSULE, COATED, EXTENDED RELEASE ORAL at 08:14

## 2017-07-18 RX ADMIN — RANITIDINE 150 MG: 150 TABLET ORAL at 08:17

## 2017-07-18 RX ADMIN — MIRTAZAPINE 15 MG: 15 TABLET, FILM COATED ORAL at 21:20

## 2017-07-18 RX ADMIN — FUROSEMIDE 40 MG: 40 TABLET ORAL at 17:01

## 2017-07-18 RX ADMIN — RANITIDINE 150 MG: 150 TABLET ORAL at 17:02

## 2017-07-18 RX ADMIN — LACTULOSE 20 G: 10 SOLUTION ORAL at 08:14

## 2017-07-18 RX ADMIN — LEVOTHYROXINE SODIUM 50 MCG: 50 TABLET ORAL at 08:15

## 2017-07-18 RX ADMIN — BUDESONIDE 500 MCG: 0.5 INHALANT RESPIRATORY (INHALATION) at 08:13

## 2017-07-18 RX ADMIN — FUROSEMIDE 40 MG: 40 TABLET ORAL at 08:14

## 2017-07-18 RX ADMIN — DOCUSATE SODIUM 100 MG: 100 CAPSULE, LIQUID FILLED ORAL at 08:15

## 2017-07-18 RX ADMIN — LACTULOSE 20 G: 10 SOLUTION ORAL at 17:02

## 2017-07-18 RX ADMIN — PREDNISONE 7.5 MG: 5 TABLET ORAL at 08:15

## 2017-07-18 RX ADMIN — ARFORMOTEROL TARTRATE 15 MCG: 15 SOLUTION RESPIRATORY (INHALATION) at 08:13

## 2017-07-18 RX ADMIN — DOCUSATE SODIUM 100 MG: 100 CAPSULE, LIQUID FILLED ORAL at 17:01

## 2017-07-18 RX ADMIN — QUETIAPINE FUMARATE 25 MG: 25 TABLET, FILM COATED ORAL at 21:20

## 2017-07-18 RX ADMIN — ASPIRIN 81 MG 81 MG: 81 TABLET ORAL at 08:15

## 2017-07-18 RX ADMIN — BUDESONIDE 500 MCG: 0.5 INHALANT RESPIRATORY (INHALATION) at 21:21

## 2017-07-18 RX ADMIN — CLOPIDOGREL BISULFATE 75 MG: 75 TABLET, FILM COATED ORAL at 08:15

## 2017-07-18 NOTE — ROUTINE PROCESS
Bedside shift change report given to Via Chai Webber, LPN (oncoming nurse) by SKINNY Garcia, RN (offgoing nurse). Report included the following information SBAR, Kardex, Intake/Output and MAR.

## 2017-07-18 NOTE — ROUTINE PROCESS
Bedside and Verbal shift change report given to SUDHEER Dillon LPN (oncoming nurse) by NISH Burris (offgoing nurse). Report included the following information SBAR, Kardex and MAR. Nursing rounds completed per facility protocol.

## 2017-07-18 NOTE — PROGRESS NOTES
BAILEY called pt's daughter Kevyn Hoff to inform he that the MD completed d/c paperwork today and that SW can change pickup time if she wanted to. She agreed to this. SW call [employer] and spoke with Beltran Barajas and a 12:30pm time was given. BAILEY called pt's daughter back to inform her of the time and to request that she call transport company re: payment.

## 2017-07-18 NOTE — PROGRESS NOTES
Pt participated in Wii bowling activity for the duration of 50 minutes. Pt interacted well with peers and RT.

## 2017-07-18 NOTE — ROUTINE PROCESS
Bedside and Verbal shift change report given to Petty Jenkins RN (oncoming nurse) by Eusebia Bryant RN (offgoing nurse). Report included the following information SBAR, Kardex and MAR. Hourly rounds made.

## 2017-07-18 NOTE — ROUTINE PROCESS
MDS SECTION GG NURSING REPORT      Patient: Kathy Petersen (76 y.o. male)                         Date: 7/18/2017    Primary Diagnosis: Pneumonia      Attending Physician: Dayan Membreno MD   Treatment Diagnosis  Treatment Diagnosis: muscle weakness  Treatment Diagnosis 2: difficulty in walking    Use the KEY on right side to code Functional Ability           MDS Section GG Data Collection Tool  Key:      01     Dependent      02     Substantial/Maximal               Assistance             (Anabel does > 50%)      03     Partial/Moderate             Assistance             (Anabel does < 50%)      04     Supervision or             Touching Assistance      05     Set-up or [address]      06     Independent      07     Resident Refused      09      not applicable      88     Not Attempted d/t             Medical or Safety      7-3 3-11 11-7      Performance Code Performance Code Performance Code    Self Care Eating 5       Oral Hygiene 2       Toilet Hygiene 1       Sit to Lying 2       Lying to sitting on SOB 3       Sit to stand 2       Chair/Chair -to- bed Transfer 2       Toilet Transfer 88      Mobility Walk 50 ft. ,   2 turns 88       Walk  150 ft. 88       Wheel 50 ft. ,   2 turns 88       Wheel 150 ft. 88

## 2017-07-18 NOTE — PROGRESS NOTES
Problem: Mobility Impaired (Adult and Pediatric)  Goal: *Acute Goals and Plan of Care (Insert Text)  PHYSICAL THERAPY STG GOALS :  Revised on 7/15/2017 and to be accomplished within 1-2 Weeks     1. Patient will move from supine to sit and sit to supine and roll side to side in bed with stand by assistance. 2. Patient will transfer from bed to chair and chair to bed with moderate assistance using stand/squat pivot transfer. 3. Patient will perform sit to stand with moderate assistance with Fair- balance and safety awareness. 4. Patient will improve standardized test score for [place] Standing Balance Scale 1. PHYSICAL THERAPY STG GOALS :  Initiated 6/28/2017 and to be accomplished within 1-2 Weeks (updated 7/11/17)    1. Patient will move from supine to sit and sit to supine and roll side to side in bed with stand by assistance. (Achieved)   2. Patient will transfer from bed to chair and chair to bed with minimal assistance using RW.  (Maintaining at Mod A x2, Max A x1)  3. Patient will perform sit to stand with minimal assistance with Fair- balance and safety awareness. (Maintaining at; max A x 2)  4. Patient will ambulate with minimal assistance/contact guard assist for 10 feet with RW on level surfaces with 1 turns. (Pt unable due to poor endurance)  5. Patient will improve standardized test score for [place] Standing Balance Scale 1+. (Not progressed; 0)  6. Patient will demonstrate transfer from electric w/c <> car with minimal assistance. (Not addressed)    PHYSICAL THERAPY STG GOALS :  Initiated 6/28/2017 and to be accomplished within 1-2 Weeks (updated 7/4/17)    1. Patient will move from supine to sit and sit to supine and roll side to side in bed with stand by assistance. (Achieved)   2. Patient will transfer from bed to chair and chair to bed with minimal assistance using RW. (Progressing; mod A x 2)  3.  Patient will perform sit to stand with minimal assistance with Fair- balance and safety awareness. (Progressing; mod A/max A x 2)  4. Patient will ambulate with minimal assistance/contact guard assist for 10 feet with RW on level surfaces with 1 turns. (Pt unable due to poor endurance)  5. Patient will improve standardized test score for [place] Standing Balance Scale 1+. (Not progressed; 0)  6. Patient will demonstrate transfer from electric w/c <> car with minimal assistance. (Not addressed)    PHYSICAL THERAPY LTG GOALS :  Initiated 6/28/2017 and to be accomplished within 3-4 Weeks    1. Patient will move from supine to sit and sit to supine and roll side to side in bed with supervision/set-up. 2. Patient will transfer from bed to chair and chair to bed with contact guard assist using RW. 3. Patient will perform sit to stand with contact guard assist with Fair balance and safety awareness. 4. Patient will ambulate with contact guard assist for 20 feet with RW on level surfaces with 2 turns. 5. Patient will improve standardized test score for [place] Standing Balance Scale 2.   6. Patient will demonstrate transfer from electric w/c <> car with contact guard assistance.      Physical Therapist: Sabi Fong PT on 6/28/2017    [facility] PHYSICAL THERAPY DISCHARGE SUMMARY        Patient: Jennifer Sharma (46 y.o. male)                  Date: 7/18/2017    Attending Physician: Sim Menjivar MD  Primary Diagnosis: Pneumonia      Treatment Diagnosis  Treatment Diagnosis: muscle weakness  Treatment Diagnosis 2: difficulty in walking         Precautions: Fall, Other (comment) (oxygen)   MEDICAL HISTORY:   Past Medical History:   Diagnosis Date    BPH (benign prostatic hyperplasia)      [facility] [address]      History of blood clots      Hypercholesterolemia      Hypertension      Hypertensive cardiovascular disease      Hypothyroidism      Infection and inflammatory reaction due to indwelling urinary catheter, subsequent encounter  Peripheral vascular disease ([facility] [address]      Pneumonia      Urinary retention      UTI (urinary tract infection)     No past surgical history on file. Reason for Discharge: [ ] Goals Met   [ ]Met highest potential  [ ]    [X] Progress ceased  [ ]Hospitalized  [ ]Other: Pt/family request for early D/C from facility. Discharge Location:  [X] Private Residence  [ ] [facility]  [ ] LTC  [ ]  [facility] [address] [X]Other: 24 hr.supervision for safety. Summarize skilled services provided and significant progress attained since last daily/weekly note (include individualized treatment techniques and standardized tests): This Patient has received skilled PT services from 17 through 17 and has made Poor progress towards their PT goals.     Improvements noted in bed mobility   Summary of education/recommendation provided to Patient/Caregivers:    Pt. Education provided to use family training provided for transfers         Objective Data:       INITIAL  ASSESSMENT DISCHARGE ASSESSMENT   COGNITIVE STATUS COGNITIVE STATUS   Neurologic State: Alert, Confused  Orientation Level: Oriented to person  Cognition: Follows commands  Perception: Appears intact  Perseveration: No perseveration noted  Safety/Judgement: Fall prevention Neurologic State: Confused  Orientation Level: Oriented to person  Cognition: Follows commands  Perception: Appears intact  Perseveration: No perseveration noted  Safety/Judgement: Fall prevention   PAIN PAIN   Pain Scale 1: Numeric (0 - 10)  Pain Intensity 1: 0  Patient Stated Pain Goal: 0  Pain Reassessment 1: Yes Pain Scale 1: Numeric (0 - 10)  Pain Intensity 1: 0  Patient Stated Pain Goal: 0  Pain Reassessment 1: Yes   GROSS ASSESSMENT GROSS ASSESSMENT   AROM: Generally decreased, functional  PROM: Generally decreased, functional  Strength: Generally decreased, functional (BLEs grossly 4/5)  Coordination: Generally decreased, functional  Tone: Normal  Sensation:  (unable to accurately assess; inconsistent responses) AROM: Generally decreased, functional (BUEs: approx 3/4 shoudler flexion)  PROM: Generally decreased, functional (BUEs)  Strength: Generally decreased, functional (BUEs: 3+/5)  Coordination: Generally decreased, functional (BUEs)  Tone: Normal (BUEs)  Sensation:  (unable to accurately assess; inconsistent responses)   BED MOBILITY BED MOBILITY   Rolling: Stand-by asssistance  Supine to Sit: Minimum assistance  Sit to Supine: Stand-by asssistance  Scooting: Stand-by asssistance Rolling: Supervision  Supine to Sit: Stand-by asssistance  Sit to Supine: Stand-by asssistance  Scooting: Stand-by asssistance, Additional time   GAIT GAIT         Exceptions to WDL (currently unable) Exceptions to WDL (currently unable)                 With 0 turns. TRANSFERS TRANSFERS   Sit to Stand: Moderate assistance, Assist x2  Stand to Sit: Moderate assistance  Bed to Chair: Total assistance, Assist x2 Sit to Stand: Total assistance  Stand to Sit: Total assistance  Bed to Chair: Total assistance   BALANCE BALANCE   Sitting: Impaired, With support  Sitting - Static: Fair (occasional)  Sitting - Dynamic: Poor (constant support) (+)  Standing: Impaired, Pull to stand, With support  Standing - Static: Poor  Standing - Dynamic :  (not assessed) Sitting: With support  Sitting - Static: Fair (occasional)  Sitting - Dynamic: Fair (occasional)  Standing: With support, Pull to stand, Impaired  Standing - Static: Poor  Standing - Dynamic : None   WHEELCHAIR MOBILITY/MGMT WHEELCHAIR MOBILITY/MGMT           With 0 turns   Visual/Perceptual       Acuity:  (unable to accurately assess)    Auditory:   Auditory  Auditory Impairment: None             Visual/Perceptual       Acuity:  (unable to accurately assess)    Auditory:   Auditory  Auditory Impairment: None             Activity Tolerance:  Fair                              Treatment:    Pt presented supine in bed.  Pt able to demonstrated supine<>sit with SBA and HOB elevated. Pt's daughter had previously stated that pt will have a hospital bed at home for ease of self care and transfers. Pt has w/c at home. Family training was provided for transfers. Therapist advised on use of gait belt and recommended for home use. Pt continues to require Max to total A for SPT's. Seated B LE TE rendered as provided for HEP. Pt able to complete all TE's with verbal and occasional visual cues for proper technique. Pt is non-ambulatory at this time. Seated dynamic balance challenged with balloon toss game to promote core control/strength for ease of transfers. Discharge Recommendations:  [X]  Home Exercise Program                                   [X]  Home Health PT             [X]  Remove throw rugs/clear environmental barriers                  [X]  Assistance with: transfers                                  [X]  Ambulation Device: Rolling Walker (Pt is non-ambulatory at this time)   [X]  Steps (pt unable to negotiate steps)   [X]  Wheelchair (w/c at home)   [ ]  Life Line/alert   [X]  WC  Ramp        Treatment minutes: 55 minutes.      Therapist :  Amanda Nunes, PTA            Date:7/18/2017

## 2017-07-18 NOTE — PROGRESS NOTES
Problem: Self Care Deficits Care Plan (Adult)  Goal: *Acute Goals and Plan of Care (Insert Text)  OCCUPATIONAL THERAPY SHORT TERM GOALS     Updated 7/14/17    1. Patient will perform Upper body ADLs with/without adaptive equipment with Supervision. (goal met)  2. Patient will perform Lower body ADLs with/without adaptive equipment with moderate assistance. (goal not met)  3. Patient will perform toileting task with moderate assistance with Fair safety to reduce falls risk. (goal not met)  4. Patient will perform functional transfers with Rolling Walker and maximal assistance. (goal not met)  5. Patient will perform standing static/dynamic balance activities for improved ADL/IADL function with moderate assistance and Fair balance and safety awarenes. (goal not met)  6. Patient will improve Barthel index scores to atleast 45/100 to improve functional mobility. (goal not met)      Initiated 7/1/2017 and to be accomplished within 2 Week(s)    1. Patient will perform Upper body ADLs with/without adaptive equipment with minimal assistance/contact guard assist. (goal met-[facility] Supervision)  2. Patient will perform Lower body ADLs with/without adaptive equipment with moderate assistance. (progressing)  3. Patient will perform toileting task with moderate assistance with Fair safety to reduce falls risk. (progressing)  4. Patient will perform functional transfers with Gearlean Melony and maximal assistance. (progressing)  5. Patient will perform standing static/dynamic balance activities for improved ADL/IADL function with moderate assistance and Fair balance and safety awarenes. (progressing)  6. Patient will improve Barthel index scores to atleast 45/100 to improve functional mobility. (progressing)    OCCUPATIONAL THERAPY LONG TERM GOALS   Initiated 7/1/2017 and to be accomplished within 4 Week(s)    1. Patient will perform Upper body ADLs with/without adaptive equipment with supervision/set-up.   2. Patient will perform Lower body ADLs with/without adaptive equipment with minimal assistance/contact guard assist.  3. Patient will perform toileting task with minimal assistance/contact guard assist with Good safety to reduce falls risk. 4. Patient will perform functional transfers with Rolling Walker and moderate assistance and Fair+ balance and safety awareness. 5. Patient will perform standing static/dynamic activity for improved ADL/IADL function with minimal assistance/contact guard assist an and Fair balance and safety awareness. 6. Patient will improve Barthel index score to 55/100 to improve independence with mobility. Therapist: MS ADRIAN Peng/L 2017   [facility]  OCCUPATIONAL THERAPY DISCHARGE SUMMARY        Patient: Jennifer Sharma (53 y.o. male)                      Date: 2017                   Attending Physician: Sim Menjivar MD  Primary Diagnosis: Pneumonia       Treatment Diagnosis  Treatment Diagnosis: muscle weakness  Treatment Diagnosis 2: difficulty in walking         Precautions: Fall, Other (comment) (oxygen)     Medical History:   Past Medical History:   Diagnosis Date    BPH (benign prostatic hyperplasia)      COPD [facility]chronic obstructive [facility]) [facility] [address]      History of blood clots      Hypercholesterolemia      Hypertension      Hypertensive cardiovascular disease      Hypothyroidism      Infection and inflammatory reaction due to indwelling urinary catheter, subsequent encounter      Peripheral vascular disease ([facility] [address]      Pneumonia      Urinary retention      UTI (urinary tract infection)     No past surgical history on file. Reason for Discharge: [ Ernesto Vargas   [ ]Met highest potential  [ ]Hospitalized   [ ]  Progress ceased  [ ]   [ Lupe Kaminskis: Patient/family requesting D/C from facility. Discharge Location:  [ Silvia Hubbard  [ ] [facility] [ Edward Olivas  [ ] [address] [ ] 24 hr.  Supervision for safety      Summarize skilled services provided and significant progress attained since last daily/weekly note (include individualized treatment techniques and standardized tests):   Patient has received skilled OT services from 7/01/17 to 7/18/17 and has made Fair progress towards their OT goals. Improvements noted in: increased independence with upper body dressing     Summary of education/recommendation provided to Patient/Caregivers in the following areas: Remove barriers/rugs,  mobility w/ w/c for grooming, bathing and upper body dressing          Objective Data:        INITIAL ASSESSMENT DISCHARGE ASSESSMENT   COGNITIVE STATUS: COGNITIVE STATUS:   Neurologic State: Alert, Confused  Orientation Level: Oriented to person  Cognition: Follows commands  Perception: Appears intact  Perseveration: No perseveration noted  Safety/Judgement: Fall prevention Mental Status  Neurologic State: Confused  Orientation Level: Oriented to person  Cognition: Follows commands  Perception: Appears intact  Perseveration: No perseveration noted  Safety/Judgement: Fall prevention   PAIN: PAIN:   Pain Scale 1: Numeric (0 - 10)  Pain Intensity 1: 0  Patient Stated Pain Goal: 0  Pain Reassessment 1: Yes Pain Screen  Pain Scale 1: Numeric (0 - 10)  Pain Intensity 1: 0  Patient Stated Pain Goal: 0  Pain Reassessment 1: Yes   BED MOBILITY BED MOBILITY   Rolling: Stand-by asssistance  Supine to Sit: Minimum assistance  Sit to Supine: Stand-by asssistance  Scooting: Stand-by asssistance Bed Mobility  Rolling: Supervision  Supine to Sit: Stand-by asssistance  Sit to Supine: Stand-by asssistance  Scooting: Stand-by asssistance, Additional time   ADL SELF CARE ADL SELF CARE   Feeding: Minimum assistance  Oral Facial Hygiene/Grooming: Minimum assistance  Bathing: Maximum assistance  Upper Body Dressing:  Moderate assistance  Lower Body Dressing: Maximum assistance  Toileting: Maximum assistance (briefs) Basic ADL  Feeding: Minimum assistance  Oral Facial Hygiene/Grooming: Minimum assistance  Bathing: Maximum assistance  Upper Body Dressing: Moderate assistance  Lower Body Dressing: Maximum assistance  Toileting: Maximum assistance (briefs)   ADL INTERVENTION ADL INTERVENTION                       Toileting  Toileting Assistance: Maximum assistance  Bladder Hygiene: Maximum assistance  Bowel Hygiene: Maximum assistance  Clothing Management: Maximum assistance               TRANSFERS TRANSFERS   Sit to Stand: Moderate assistance, Assist x2  Stand to Sit: Moderate assistance  Bed to Chair: Total assistance, Assist x2  Toilet Transfer :  (not assessed secondary to poor standing balance) Functional Transfers  Sit to Stand:  Total assistance  Stand to Sit: Total assistance  Bed to Chair: Total assistance  Toilet Transfer :  (not assessed secondary to poor standing balance)   BALANCE BALANCE   Sitting: Impaired, With support  Sitting - Static: Fair (occasional)  Sitting - Dynamic: Poor (constant support) (+)  Standing: Impaired, Pull to stand, With support  Standing - Static: Poor  Standing - Dynamic :  (not assessed) Balance  Sitting: With support  Sitting - Static: Fair (occasional)  Sitting - Dynamic: Fair (occasional)  Standing: With support, Pull to stand, Impaired  Standing - Static: Poor  Standing - Dynamic : None   GROSS ASSESSMENT  GROSS ASSESSMENT   AROM: Generally decreased, functional  PROM: Generally decreased, functional  Strength: Generally decreased, functional (BLEs grossly 4/5)  Coordination: Generally decreased, functional  Tone: Normal  Sensation:  (unable to accurately assess; inconsistent responses) Gross Assessment  AROM: Generally decreased, functional (BUEs: approx 3/4 shoudler flexion)  PROM: Generally decreased, functional (BUEs)  Strength: Generally decreased, functional (BUEs: 3+/5)  Coordination: Generally decreased, functional (BUEs)  Tone: Normal (BUEs)  Sensation:  (unable to accurately assess; inconsistent responses)   COORDINATION COORDINATION   Fine Motor Skills-Upper: Right Intact, Left Intact  Gross Motor Skills-Upper: Right Intact, Left Intact Coordination  Fine Motor Skills-Upper: Right Intact, Left Intact  Gross Motor Skills-Upper: Right Intact, Left Intact   VISUAL/PERCEPTUAL VISUAL/PERCEPTUAL   Acuity:  (unable to accurately assess)  Vision  Acuity:  (unable to accurately assess)     AUDITORY: AUDITORY:   Auditory Impairment: Hard of hearing, bilateral Auditory  Auditory Impairment: None   I ADL'S:  I ADL'S:            THE BARTHEL INDEX      ACTIVITY    SCORE   FEEDING  0=unable  5=needs help cutting,spreading butter,etc., or modified diet  10= independent    10   BATHING  0=dependent  5=independent (or in shower    0   GROOMING  0=needs help  5=independent face/hair/teeth/shaving (implements provided)    0   DRESSING  0=dependent  5=needs help but can do about half unaided  10=independent(including buttons, zips,laces etc.)    5   BOWELS  0=incontinent  5=occasional accident  10=continent    0   BLADDER  0=incontinent, or catheterized and unable to manage alone  5=occasional accident  10=continent    0   TOILET USE  0=dependent  5=needs some help, but can do something alone  10=independent (on and off, dressing, wiping)    0   TRANSFER (BED TO CHAIR AND BACK)  0=unable, no sitting balance  5=major help(one or two people,physical), can sit  10=minor help(verbal or physical)  15=independent    5   MOBILITY (ON LEVEL SURFACES)  0=immobile or <50 yards  5=wheelchair independent,including corners,>50 yards  10=walkes with help of one person (verbal or physical) >50 yards  15=independent(but may use any aid; for example, stick) >50 yards    0   STAIRS  0=unable  5=needs help (verbal, physical, carrying aid)  10=independent    0               TOTAL:               20/100      Treatment :  Assisted patient with changing undergarments in bed due to incontinence episode as well as LB dressing in order to assess independence during routine.  See above for levels of A needed. Assisted patient with SPT from bed>w/c in order to increase independence and performance during routine. Patient required total A x 2 for transfer today. Cueing needed for patient to straighten knees during transfer for optimal safety and independence. Discharge Recommendations:  [X] Home Exercise Program                        [ ] Elevated toilet seat/3 in 1 commode      [ ] Camden Arango      [ Selvin Watkins                     [ ] Life Line/alert          [ ] Splint/orthotic application/schedule  [ ] Grab Bars: Location   [X] Home Health OT       [ ] WC 16 inch      Treatment session:  37 minutes.      Therapist: Purvi Templeton, [address]      Date:7/18/2017

## 2017-07-19 ENCOUNTER — HOME HEALTH ADMISSION (OUTPATIENT)
Dept: HOME HEALTH SERVICES | Facility: HOME HEALTH | Age: 82
End: 2017-07-19
Payer: MEDICARE

## 2017-07-19 VITALS
OXYGEN SATURATION: 94 % | HEART RATE: 73 BPM | DIASTOLIC BLOOD PRESSURE: 58 MMHG | TEMPERATURE: 98.3 F | BODY MASS INDEX: 25.58 KG/M2 | SYSTOLIC BLOOD PRESSURE: 117 MMHG | HEIGHT: 67 IN | RESPIRATION RATE: 18 BRPM | WEIGHT: 163 LBS

## 2017-07-19 PROCEDURE — 74011000250 HC RX REV CODE- 250: Performed by: INTERNAL MEDICINE

## 2017-07-19 PROCEDURE — 74011250637 HC RX REV CODE- 250/637: Performed by: INTERNAL MEDICINE

## 2017-07-19 PROCEDURE — 74011636637 HC RX REV CODE- 636/637: Performed by: INTERNAL MEDICINE

## 2017-07-19 RX ADMIN — PREDNISONE 7.5 MG: 5 TABLET ORAL at 08:24

## 2017-07-19 RX ADMIN — ARFORMOTEROL TARTRATE 15 MCG: 15 SOLUTION RESPIRATORY (INHALATION) at 08:20

## 2017-07-19 RX ADMIN — DILTIAZEM HYDROCHLORIDE 180 MG: 180 CAPSULE, COATED, EXTENDED RELEASE ORAL at 08:24

## 2017-07-19 RX ADMIN — LEVOTHYROXINE SODIUM 50 MCG: 50 TABLET ORAL at 08:33

## 2017-07-19 RX ADMIN — DOCUSATE SODIUM 100 MG: 100 CAPSULE, LIQUID FILLED ORAL at 08:33

## 2017-07-19 RX ADMIN — RANITIDINE 150 MG: 150 TABLET ORAL at 08:33

## 2017-07-19 RX ADMIN — FUROSEMIDE 40 MG: 40 TABLET ORAL at 08:24

## 2017-07-19 RX ADMIN — CLOPIDOGREL BISULFATE 75 MG: 75 TABLET, FILM COATED ORAL at 08:23

## 2017-07-19 RX ADMIN — ASPIRIN 81 MG 81 MG: 81 TABLET ORAL at 08:33

## 2017-07-19 RX ADMIN — FINASTERIDE 5 MG: 5 TABLET, FILM COATED ORAL at 08:25

## 2017-07-19 RX ADMIN — BUDESONIDE 500 MCG: 0.5 INHALANT RESPIRATORY (INHALATION) at 08:20

## 2017-07-19 RX ADMIN — TAMSULOSIN HYDROCHLORIDE 0.4 MG: 0.4 CAPSULE ORAL at 08:34

## 2017-07-19 RX ADMIN — LACTULOSE 20 G: 10 SOLUTION ORAL at 08:22

## 2017-07-19 NOTE — ROUTINE PROCESS
Discharge instructions provided to caregiver and patient per MD order. All questions and concerns were addressed prior to [facility] signing the paperwork. Patients fall band and DNR band was removed prior to leaving the floor. Patient's armband was already removed per patient. Patient was transferred into personal wheelchair and was using his own Oxygen when transport service arrived.

## 2017-07-19 NOTE — ROUTINE PROCESS
Bedside and verbal shift report given to ARIN Rodrigez (oncoming nurse) by SUDHEER Shelton LPN (off going nurse). Report included SBAR, MAR and Kardex. Hourly rounds complete.

## 2017-07-19 NOTE — ROUTINE PROCESS
Bedside and Verbal shift change report given to A Nikolai LPN (oncoming nurse) by Canelo Wheatley RN (offgoing nurse). Report included the following information SBAR, Kardex and MAR.  24 hour chart check completed, pt visually checked q 1 hour by nursing staff

## 2017-07-19 NOTE — PROGRESS NOTES
Home care referral sent to [address] [place] [address] Drive via [facility] and called to the [facility] for f/u.

## 2017-07-21 ENCOUNTER — HOME CARE VISIT (OUTPATIENT)
Dept: SCHEDULING | Facility: HOME HEALTH | Age: 82
End: 2017-07-21
Payer: MEDICARE

## 2017-07-21 ENCOUNTER — HOME CARE VISIT (OUTPATIENT)
Dept: HOME HEALTH SERVICES | Facility: HOME HEALTH | Age: 82
End: 2017-07-21

## 2017-07-21 PROCEDURE — G0151 HHCP-SERV OF PT,EA 15 MIN: HCPCS

## 2017-07-21 PROCEDURE — 3331090002 HH PPS REVENUE DEBIT

## 2017-07-21 PROCEDURE — 400013 HH SOC

## 2017-07-21 PROCEDURE — 3331090001 HH PPS REVENUE CREDIT

## 2017-07-21 NOTE — DISCHARGE SUMMARY
[id number] SUMMARY    Name:  Rafaela Mendez  MR#:  [id number]  :  1929  Account #:  [de-identified]  Date of Adm:  2017  Date of Discharge:  2017      DISCHARGE DIAGNOSES:  1. Severe oxygen and steroid dependent COPD. 2. Recent pneumonia. 3. Chronic hypoxic and hypercapnic respiratory failure. 4. Chronic diastolic heart failure. 5. Fatty liver. 6. Hypothyroidism. 7. Benign prosthetic hypoplasia. 8. Urine  retention. 9. Paroxysmal atrial fibrillation. 10. Hypertension. 11. Chronic kidney disease. 12. Steroid-induced hyperglycemia. 13. Peripheral vascular disease. 14. Stable right upper lung nodule. 15. Peripheral vascular disease status post lower extremity  vascularization. 16. Status post lumbar spine decompression and fusion. 17. Skin cancer removal.  18. Colonoscopy. DISCHARGE MEDICATIONS:  1. Advair 1 puff b.i.d.  2. Plavix 75 mg a day. 3. Aspirin 81 mg a day. 4. Cardizem  mg a day. 5. Colace 1 b.i.d.  6. Proscar 5 mg a day. 7. Lasix 40 mg twice a day. 8. Lactulose 30 mL 2 times daily. 9. Synthroid 50 mcg daily. 10. Remeron 15 mg at bedtime. 11. Prednisone 7.5 mg daily>  12. Seroquel 25 mg at bedtime. 13. Zantac 150 mg twice a day. 14. Flomax 0.4 mg at bedtime. 15. DuoNeb every 6 hours as needed. DISPOSITION: Home with hospital bed and oxygen. Followup in our  office in a week. REASON FOR ADMISSION: This is an 80year-old gentleman with the  above problems, who was initially hospitalized on the acute side with  pneumonia and COPD exacerbation and acute-on-chronic respiratory  failure. The patient also had moderately severe malnutrition  hyponatremia, which was felt to be dilutional , and hypokalemia. All those  were corrected. The patient stabilized and was gradually weaned off  high-flow oxygen to his baseline of 4 to 5 liters of O2. He was very  deconditioned and admitted to [facility] for continued monitoring and  rehab. The patient had episodes of agitation, requiring Seroquel with  adequate response. He remains stable from the hemodynamic and  cardiopulmonary standpoint. He received physical therapy and modest  improvement. PHYSICAL EXAM:  VITAL SIGNS: Stable. He is awake and alert. LUNGS: Decreased breath sounds but no wheezes. HEART: Regular rhythm. EXTREMITIES:  + edema. NEUROLOGIC EXAM: Nonfocal.    CONDITION: The patient is felt to be stable for discharge. HE IS A DNR.                 MD MEERA Roman / Jeremy Wyman  D:  07/18/2017   09:57  T:  07/21/2017   08:29  Job #:  [id number]

## 2017-07-22 VITALS
SYSTOLIC BLOOD PRESSURE: 100 MMHG | TEMPERATURE: 97.3 F | OXYGEN SATURATION: 91 % | DIASTOLIC BLOOD PRESSURE: 48 MMHG | HEART RATE: 77 BPM

## 2017-07-22 PROCEDURE — 3331090001 HH PPS REVENUE CREDIT

## 2017-07-22 PROCEDURE — 3331090002 HH PPS REVENUE DEBIT

## 2017-07-23 PROCEDURE — 3331090002 HH PPS REVENUE DEBIT

## 2017-07-23 PROCEDURE — 3331090001 HH PPS REVENUE CREDIT

## 2017-07-24 ENCOUNTER — HOME CARE VISIT (OUTPATIENT)
Dept: SCHEDULING | Facility: HOME HEALTH | Age: 82
End: 2017-07-24
Payer: MEDICARE

## 2017-07-24 ENCOUNTER — HOME CARE VISIT (OUTPATIENT)
Dept: HOME HEALTH SERVICES | Facility: HOME HEALTH | Age: 82
End: 2017-07-24
Payer: MEDICARE

## 2017-07-24 PROCEDURE — 3331090001 HH PPS REVENUE CREDIT

## 2017-07-24 PROCEDURE — G0152 HHCP-SERV OF OT,EA 15 MIN: HCPCS

## 2017-07-24 PROCEDURE — 3331090002 HH PPS REVENUE DEBIT

## 2017-07-25 ENCOUNTER — HOME CARE VISIT (OUTPATIENT)
Dept: SCHEDULING | Facility: HOME HEALTH | Age: 82
End: 2017-07-25
Payer: MEDICARE

## 2017-07-25 VITALS — OXYGEN SATURATION: 89 % | SYSTOLIC BLOOD PRESSURE: 111 MMHG | HEART RATE: 70 BPM | DIASTOLIC BLOOD PRESSURE: 65 MMHG

## 2017-07-25 PROCEDURE — 3331090002 HH PPS REVENUE DEBIT

## 2017-07-25 PROCEDURE — 3331090001 HH PPS REVENUE CREDIT

## 2017-07-25 PROCEDURE — G0157 HHC PT ASSISTANT EA 15: HCPCS

## 2017-07-25 PROCEDURE — G0156 HHCP-SVS OF AIDE,EA 15 MIN: HCPCS

## 2017-07-26 ENCOUNTER — HOME CARE VISIT (OUTPATIENT)
Dept: SCHEDULING | Facility: HOME HEALTH | Age: 82
End: 2017-07-26
Payer: MEDICARE

## 2017-07-26 VITALS — HEART RATE: 57 BPM | DIASTOLIC BLOOD PRESSURE: 57 MMHG | SYSTOLIC BLOOD PRESSURE: 120 MMHG | OXYGEN SATURATION: 91 %

## 2017-07-26 VITALS — HEART RATE: 76 BPM | DIASTOLIC BLOOD PRESSURE: 72 MMHG | OXYGEN SATURATION: 93 % | SYSTOLIC BLOOD PRESSURE: 110 MMHG

## 2017-07-26 VITALS — SYSTOLIC BLOOD PRESSURE: 120 MMHG | HEART RATE: 57 BPM | OXYGEN SATURATION: 91 % | DIASTOLIC BLOOD PRESSURE: 57 MMHG

## 2017-07-26 PROCEDURE — G0152 HHCP-SERV OF OT,EA 15 MIN: HCPCS

## 2017-07-26 PROCEDURE — G0157 HHC PT ASSISTANT EA 15: HCPCS

## 2017-07-26 PROCEDURE — 3331090001 HH PPS REVENUE CREDIT

## 2017-07-26 PROCEDURE — 3331090002 HH PPS REVENUE DEBIT

## 2017-07-27 ENCOUNTER — HOME CARE VISIT (OUTPATIENT)
Dept: SCHEDULING | Facility: HOME HEALTH | Age: 82
End: 2017-07-27
Payer: MEDICARE

## 2017-07-27 PROCEDURE — 3331090002 HH PPS REVENUE DEBIT

## 2017-07-27 PROCEDURE — 3331090001 HH PPS REVENUE CREDIT

## 2017-07-27 PROCEDURE — G0156 HHCP-SVS OF AIDE,EA 15 MIN: HCPCS

## 2017-07-28 ENCOUNTER — HOME CARE VISIT (OUTPATIENT)
Dept: SCHEDULING | Facility: HOME HEALTH | Age: 82
End: 2017-07-28
Payer: MEDICARE

## 2017-07-28 VITALS — SYSTOLIC BLOOD PRESSURE: 112 MMHG | HEART RATE: 76 BPM | OXYGEN SATURATION: 97 % | DIASTOLIC BLOOD PRESSURE: 57 MMHG

## 2017-07-28 PROCEDURE — 3331090001 HH PPS REVENUE CREDIT

## 2017-07-28 PROCEDURE — G0157 HHC PT ASSISTANT EA 15: HCPCS

## 2017-07-28 PROCEDURE — 3331090002 HH PPS REVENUE DEBIT

## 2017-07-29 PROCEDURE — 3331090002 HH PPS REVENUE DEBIT

## 2017-07-29 PROCEDURE — 3331090001 HH PPS REVENUE CREDIT

## 2017-07-30 PROCEDURE — 3331090002 HH PPS REVENUE DEBIT

## 2017-07-30 PROCEDURE — 3331090001 HH PPS REVENUE CREDIT

## 2017-07-31 ENCOUNTER — HOME CARE VISIT (OUTPATIENT)
Dept: SCHEDULING | Facility: HOME HEALTH | Age: 82
End: 2017-07-31
Payer: MEDICARE

## 2017-07-31 VITALS — OXYGEN SATURATION: 92 % | HEART RATE: 71 BPM | SYSTOLIC BLOOD PRESSURE: 120 MMHG | DIASTOLIC BLOOD PRESSURE: 60 MMHG

## 2017-07-31 PROCEDURE — G0152 HHCP-SERV OF OT,EA 15 MIN: HCPCS

## 2017-07-31 PROCEDURE — 3331090002 HH PPS REVENUE DEBIT

## 2017-07-31 PROCEDURE — 3331090001 HH PPS REVENUE CREDIT

## 2017-07-31 PROCEDURE — G0157 HHC PT ASSISTANT EA 15: HCPCS

## 2017-08-01 ENCOUNTER — HOME CARE VISIT (OUTPATIENT)
Dept: SCHEDULING | Facility: HOME HEALTH | Age: 82
End: 2017-08-01
Payer: MEDICARE

## 2017-08-01 VITALS — HEART RATE: 73 BPM | OXYGEN SATURATION: 90 % | SYSTOLIC BLOOD PRESSURE: 116 MMHG | DIASTOLIC BLOOD PRESSURE: 75 MMHG

## 2017-08-01 VITALS — HEART RATE: 87 BPM | OXYGEN SATURATION: 92 % | DIASTOLIC BLOOD PRESSURE: 63 MMHG | SYSTOLIC BLOOD PRESSURE: 119 MMHG

## 2017-08-01 PROCEDURE — 3331090002 HH PPS REVENUE DEBIT

## 2017-08-01 PROCEDURE — G0156 HHCP-SVS OF AIDE,EA 15 MIN: HCPCS

## 2017-08-01 PROCEDURE — G0157 HHC PT ASSISTANT EA 15: HCPCS

## 2017-08-01 PROCEDURE — 3331090001 HH PPS REVENUE CREDIT

## 2017-08-02 ENCOUNTER — HOME CARE VISIT (OUTPATIENT)
Dept: SCHEDULING | Facility: HOME HEALTH | Age: 82
End: 2017-08-02
Payer: MEDICARE

## 2017-08-02 VITALS — DIASTOLIC BLOOD PRESSURE: 73 MMHG | SYSTOLIC BLOOD PRESSURE: 135 MMHG | OXYGEN SATURATION: 86 % | HEART RATE: 80 BPM

## 2017-08-02 PROCEDURE — G0152 HHCP-SERV OF OT,EA 15 MIN: HCPCS

## 2017-08-02 PROCEDURE — 3331090002 HH PPS REVENUE DEBIT

## 2017-08-02 PROCEDURE — 3331090001 HH PPS REVENUE CREDIT

## 2017-08-03 ENCOUNTER — HOME CARE VISIT (OUTPATIENT)
Dept: SCHEDULING | Facility: HOME HEALTH | Age: 82
End: 2017-08-03
Payer: MEDICARE

## 2017-08-03 VITALS — DIASTOLIC BLOOD PRESSURE: 63 MMHG | HEART RATE: 74 BPM | OXYGEN SATURATION: 91 % | SYSTOLIC BLOOD PRESSURE: 124 MMHG

## 2017-08-03 PROCEDURE — 3331090002 HH PPS REVENUE DEBIT

## 2017-08-03 PROCEDURE — 3331090001 HH PPS REVENUE CREDIT

## 2017-08-03 PROCEDURE — G0151 HHCP-SERV OF PT,EA 15 MIN: HCPCS

## 2017-08-03 PROCEDURE — G0156 HHCP-SVS OF AIDE,EA 15 MIN: HCPCS

## 2017-08-04 PROCEDURE — 3331090002 HH PPS REVENUE DEBIT

## 2017-08-04 PROCEDURE — 3331090001 HH PPS REVENUE CREDIT

## 2017-08-05 PROCEDURE — 3331090001 HH PPS REVENUE CREDIT

## 2017-08-05 PROCEDURE — 3331090002 HH PPS REVENUE DEBIT

## 2017-08-06 PROCEDURE — 3331090001 HH PPS REVENUE CREDIT

## 2017-08-06 PROCEDURE — 3331090002 HH PPS REVENUE DEBIT

## 2017-08-07 ENCOUNTER — HOME CARE VISIT (OUTPATIENT)
Dept: SCHEDULING | Facility: HOME HEALTH | Age: 82
End: 2017-08-07
Payer: MEDICARE

## 2017-08-07 PROCEDURE — 3331090001 HH PPS REVENUE CREDIT

## 2017-08-07 PROCEDURE — 3331090002 HH PPS REVENUE DEBIT

## 2017-08-07 PROCEDURE — G0152 HHCP-SERV OF OT,EA 15 MIN: HCPCS

## 2017-08-08 ENCOUNTER — HOME CARE VISIT (OUTPATIENT)
Dept: SCHEDULING | Facility: HOME HEALTH | Age: 82
End: 2017-08-08
Payer: MEDICARE

## 2017-08-08 VITALS — SYSTOLIC BLOOD PRESSURE: 113 MMHG | HEART RATE: 66 BPM | DIASTOLIC BLOOD PRESSURE: 67 MMHG | OXYGEN SATURATION: 74 %

## 2017-08-08 VITALS — HEART RATE: 66 BPM | OXYGEN SATURATION: 90 % | DIASTOLIC BLOOD PRESSURE: 65 MMHG | SYSTOLIC BLOOD PRESSURE: 124 MMHG

## 2017-08-08 PROCEDURE — 3331090001 HH PPS REVENUE CREDIT

## 2017-08-08 PROCEDURE — 3331090002 HH PPS REVENUE DEBIT

## 2017-08-08 PROCEDURE — G0157 HHC PT ASSISTANT EA 15: HCPCS

## 2017-08-08 PROCEDURE — G0156 HHCP-SVS OF AIDE,EA 15 MIN: HCPCS

## 2017-08-09 ENCOUNTER — HOME CARE VISIT (OUTPATIENT)
Dept: SCHEDULING | Facility: HOME HEALTH | Age: 82
End: 2017-08-09
Payer: MEDICARE

## 2017-08-09 VITALS — SYSTOLIC BLOOD PRESSURE: 128 MMHG | HEART RATE: 97 BPM | DIASTOLIC BLOOD PRESSURE: 77 MMHG | OXYGEN SATURATION: 90 %

## 2017-08-09 PROCEDURE — G0152 HHCP-SERV OF OT,EA 15 MIN: HCPCS

## 2017-08-09 PROCEDURE — 3331090002 HH PPS REVENUE DEBIT

## 2017-08-09 PROCEDURE — 3331090001 HH PPS REVENUE CREDIT

## 2017-08-10 ENCOUNTER — HOME CARE VISIT (OUTPATIENT)
Dept: SCHEDULING | Facility: HOME HEALTH | Age: 82
End: 2017-08-10
Payer: MEDICARE

## 2017-08-10 VITALS — HEART RATE: 79 BPM | OXYGEN SATURATION: 92 % | SYSTOLIC BLOOD PRESSURE: 124 MMHG | DIASTOLIC BLOOD PRESSURE: 64 MMHG

## 2017-08-10 PROCEDURE — G0157 HHC PT ASSISTANT EA 15: HCPCS

## 2017-08-10 PROCEDURE — 3331090001 HH PPS REVENUE CREDIT

## 2017-08-10 PROCEDURE — 3331090002 HH PPS REVENUE DEBIT

## 2017-08-11 PROCEDURE — 3331090001 HH PPS REVENUE CREDIT

## 2017-08-11 PROCEDURE — 3331090002 HH PPS REVENUE DEBIT

## 2017-08-12 PROCEDURE — 3331090001 HH PPS REVENUE CREDIT

## 2017-08-12 PROCEDURE — 3331090002 HH PPS REVENUE DEBIT

## 2017-08-13 PROCEDURE — 3331090001 HH PPS REVENUE CREDIT

## 2017-08-13 PROCEDURE — 3331090002 HH PPS REVENUE DEBIT

## 2017-08-14 ENCOUNTER — HOME CARE VISIT (OUTPATIENT)
Dept: SCHEDULING | Facility: HOME HEALTH | Age: 82
End: 2017-08-14
Payer: MEDICARE

## 2017-08-14 VITALS — OXYGEN SATURATION: 91 % | DIASTOLIC BLOOD PRESSURE: 60 MMHG | SYSTOLIC BLOOD PRESSURE: 142 MMHG | HEART RATE: 74 BPM

## 2017-08-14 PROCEDURE — 3331090002 HH PPS REVENUE DEBIT

## 2017-08-14 PROCEDURE — G0157 HHC PT ASSISTANT EA 15: HCPCS

## 2017-08-14 PROCEDURE — 3331090001 HH PPS REVENUE CREDIT

## 2017-08-15 PROCEDURE — 3331090002 HH PPS REVENUE DEBIT

## 2017-08-15 PROCEDURE — 3331090001 HH PPS REVENUE CREDIT

## 2017-08-16 PROCEDURE — 3331090002 HH PPS REVENUE DEBIT

## 2017-08-16 PROCEDURE — 3331090001 HH PPS REVENUE CREDIT

## 2017-08-17 ENCOUNTER — HOME CARE VISIT (OUTPATIENT)
Dept: SCHEDULING | Facility: HOME HEALTH | Age: 82
End: 2017-08-17
Payer: MEDICARE

## 2017-08-17 VITALS — DIASTOLIC BLOOD PRESSURE: 65 MMHG | OXYGEN SATURATION: 90 % | SYSTOLIC BLOOD PRESSURE: 138 MMHG | HEART RATE: 87 BPM

## 2017-08-17 PROCEDURE — G0157 HHC PT ASSISTANT EA 15: HCPCS

## 2017-08-17 PROCEDURE — 3331090002 HH PPS REVENUE DEBIT

## 2017-08-17 PROCEDURE — 3331090001 HH PPS REVENUE CREDIT

## 2017-08-18 PROCEDURE — 3331090002 HH PPS REVENUE DEBIT

## 2017-08-18 PROCEDURE — 3331090001 HH PPS REVENUE CREDIT

## 2017-08-19 PROCEDURE — 3331090002 HH PPS REVENUE DEBIT

## 2017-08-19 PROCEDURE — 3331090001 HH PPS REVENUE CREDIT

## 2017-08-20 PROCEDURE — 3331090001 HH PPS REVENUE CREDIT

## 2017-08-20 PROCEDURE — 3331090002 HH PPS REVENUE DEBIT

## 2017-08-21 PROCEDURE — 3331090002 HH PPS REVENUE DEBIT

## 2017-08-21 PROCEDURE — 3331090001 HH PPS REVENUE CREDIT

## 2017-08-22 ENCOUNTER — HOME CARE VISIT (OUTPATIENT)
Dept: SCHEDULING | Facility: HOME HEALTH | Age: 82
End: 2017-08-22
Payer: MEDICARE

## 2017-08-22 VITALS — DIASTOLIC BLOOD PRESSURE: 64 MMHG | OXYGEN SATURATION: 93 % | SYSTOLIC BLOOD PRESSURE: 118 MMHG | HEART RATE: 66 BPM

## 2017-08-22 PROCEDURE — G0157 HHC PT ASSISTANT EA 15: HCPCS

## 2017-08-22 PROCEDURE — 3331090001 HH PPS REVENUE CREDIT

## 2017-08-22 PROCEDURE — 3331090002 HH PPS REVENUE DEBIT

## 2017-08-23 PROCEDURE — 3331090002 HH PPS REVENUE DEBIT

## 2017-08-23 PROCEDURE — 3331090001 HH PPS REVENUE CREDIT

## 2017-08-24 PROCEDURE — 3331090001 HH PPS REVENUE CREDIT

## 2017-08-24 PROCEDURE — 3331090002 HH PPS REVENUE DEBIT

## 2017-08-25 ENCOUNTER — HOME CARE VISIT (OUTPATIENT)
Dept: SCHEDULING | Facility: HOME HEALTH | Age: 82
End: 2017-08-25
Payer: MEDICARE

## 2017-08-25 VITALS — OXYGEN SATURATION: 90 % | SYSTOLIC BLOOD PRESSURE: 118 MMHG | HEART RATE: 74 BPM | DIASTOLIC BLOOD PRESSURE: 63 MMHG

## 2017-08-25 PROCEDURE — 3331090002 HH PPS REVENUE DEBIT

## 2017-08-25 PROCEDURE — G0157 HHC PT ASSISTANT EA 15: HCPCS

## 2017-08-25 PROCEDURE — 3331090001 HH PPS REVENUE CREDIT

## 2017-08-26 PROCEDURE — 3331090001 HH PPS REVENUE CREDIT

## 2017-08-26 PROCEDURE — 3331090002 HH PPS REVENUE DEBIT

## 2017-08-27 PROCEDURE — 3331090001 HH PPS REVENUE CREDIT

## 2017-08-27 PROCEDURE — 3331090002 HH PPS REVENUE DEBIT

## 2017-08-28 ENCOUNTER — HOME CARE VISIT (OUTPATIENT)
Dept: SCHEDULING | Facility: HOME HEALTH | Age: 82
End: 2017-08-28
Payer: MEDICARE

## 2017-08-28 PROCEDURE — 3331090002 HH PPS REVENUE DEBIT

## 2017-08-28 PROCEDURE — 3331090001 HH PPS REVENUE CREDIT

## 2017-08-28 PROCEDURE — 3331090003 HH PPS REVENUE ADJ

## 2017-08-28 PROCEDURE — G0151 HHCP-SERV OF PT,EA 15 MIN: HCPCS

## 2017-08-29 VITALS — DIASTOLIC BLOOD PRESSURE: 65 MMHG | SYSTOLIC BLOOD PRESSURE: 122 MMHG | HEART RATE: 78 BPM | OXYGEN SATURATION: 89 %

## 2017-08-29 PROCEDURE — 3331090001 HH PPS REVENUE CREDIT

## 2017-08-29 PROCEDURE — 3331090002 HH PPS REVENUE DEBIT

## 2017-08-30 PROCEDURE — 3331090001 HH PPS REVENUE CREDIT

## 2017-08-30 PROCEDURE — 3331090002 HH PPS REVENUE DEBIT

## 2017-08-31 PROCEDURE — 3331090001 HH PPS REVENUE CREDIT

## 2017-08-31 PROCEDURE — 3331090002 HH PPS REVENUE DEBIT

## 2017-09-01 PROCEDURE — 3331090001 HH PPS REVENUE CREDIT

## 2017-09-01 PROCEDURE — 3331090002 HH PPS REVENUE DEBIT

## 2017-09-02 PROCEDURE — 3331090002 HH PPS REVENUE DEBIT

## 2017-09-02 PROCEDURE — 3331090001 HH PPS REVENUE CREDIT

## 2017-09-03 PROCEDURE — 3331090002 HH PPS REVENUE DEBIT

## 2017-09-03 PROCEDURE — 3331090001 HH PPS REVENUE CREDIT

## 2017-10-15 ENCOUNTER — APPOINTMENT (OUTPATIENT)
Dept: GENERAL RADIOLOGY | Age: 82
End: 2017-10-15
Attending: EMERGENCY MEDICINE
Payer: MEDICARE

## 2017-10-15 ENCOUNTER — HOSPITAL ENCOUNTER (EMERGENCY)
Age: 82
Discharge: HOME | End: 2017-10-15
Attending: EMERGENCY MEDICINE
Payer: MEDICARE

## 2017-10-15 VITALS
RESPIRATION RATE: 23 BRPM | OXYGEN SATURATION: 93 % | SYSTOLIC BLOOD PRESSURE: 168 MMHG | BODY MASS INDEX: 24.86 KG/M2 | HEART RATE: 87 BPM | HEIGHT: 68 IN | DIASTOLIC BLOOD PRESSURE: 107 MMHG | WEIGHT: 164 LBS | TEMPERATURE: 97.4 F

## 2017-10-15 DIAGNOSIS — R03.0 ELEVATED BLOOD PRESSURE READING: ICD-10-CM

## 2017-10-15 DIAGNOSIS — R31.9 URINARY TRACT INFECTION WITH HEMATURIA, SITE UNSPECIFIED: ICD-10-CM

## 2017-10-15 DIAGNOSIS — R31.9 HEMATURIA, UNSPECIFIED TYPE: Primary | ICD-10-CM

## 2017-10-15 DIAGNOSIS — R05.9 COUGH: ICD-10-CM

## 2017-10-15 DIAGNOSIS — N39.0 URINARY TRACT INFECTION WITH HEMATURIA, SITE UNSPECIFIED: ICD-10-CM

## 2017-10-15 LAB
ANION GAP SERPL CALC-SCNC: 8 MMOL/L (ref 3–18)
APPEARANCE UR: ABNORMAL
APTT PPP: 26.1 SEC (ref 23–36.4)
BACTERIA URNS QL MICRO: ABNORMAL /HPF
BASOPHILS # BLD: 0 K/UL (ref 0–0.06)
BASOPHILS NFR BLD: 0 % (ref 0–2)
BILIRUB UR QL: NEGATIVE
BUN SERPL-MCNC: 42 MG/DL (ref 7–18)
BUN/CREAT SERPL: 24 (ref 12–20)
CALCIUM SERPL-MCNC: 9 MG/DL (ref 8.5–10.1)
CHLORIDE SERPL-SCNC: 100 MMOL/L (ref 100–108)
CO2 SERPL-SCNC: 32 MMOL/L (ref 21–32)
COLOR UR: ABNORMAL
CREAT SERPL-MCNC: 1.73 MG/DL (ref 0.6–1.3)
DIFFERENTIAL METHOD BLD: ABNORMAL
EOSINOPHIL # BLD: 0.1 K/UL (ref 0–0.4)
EOSINOPHIL NFR BLD: 1 % (ref 0–5)
EPITH CASTS URNS QL MICRO: NEGATIVE /LPF (ref 0–5)
ERYTHROCYTE [DISTWIDTH] IN BLOOD BY AUTOMATED COUNT: 14 % (ref 11.6–14.5)
GLUCOSE SERPL-MCNC: 191 MG/DL (ref 74–99)
GLUCOSE UR STRIP.AUTO-MCNC: 100 MG/DL
HCT VFR BLD AUTO: 46.5 % (ref 36–48)
HGB BLD-MCNC: 15 G/DL (ref 13–16)
HGB UR QL STRIP: ABNORMAL
INR PPP: 1 (ref 0.8–1.2)
KETONES UR QL STRIP.AUTO: NEGATIVE MG/DL
LACTATE BLD-SCNC: 1.5 MMOL/L (ref 0.4–2)
LEUKOCYTE ESTERASE UR QL STRIP.AUTO: ABNORMAL
LYMPHOCYTES # BLD: 2.7 K/UL (ref 0.9–3.6)
LYMPHOCYTES NFR BLD: 14 % (ref 21–52)
MCH RBC QN AUTO: 29.1 PG (ref 24–34)
MCHC RBC AUTO-ENTMCNC: 32.3 G/DL (ref 31–37)
MCV RBC AUTO: 90.3 FL (ref 74–97)
MONOCYTES # BLD: 0.8 K/UL (ref 0.05–1.2)
MONOCYTES NFR BLD: 4 % (ref 3–10)
NEUTS SEG # BLD: 16.2 K/UL (ref 1.8–8)
NEUTS SEG NFR BLD: 81 % (ref 40–73)
NITRITE UR QL STRIP.AUTO: NEGATIVE
PH UR STRIP: 7 [PH] (ref 5–8)
PLATELET # BLD AUTO: 273 K/UL (ref 135–420)
PMV BLD AUTO: 9.6 FL (ref 9.2–11.8)
POTASSIUM SERPL-SCNC: 4.8 MMOL/L (ref 3.5–5.5)
PROT UR STRIP-MCNC: >1000 MG/DL
PROTHROMBIN TIME: 12.2 SEC (ref 11.5–15.2)
RBC # BLD AUTO: 5.15 M/UL (ref 4.7–5.5)
RBC #/AREA URNS HPF: ABNORMAL /HPF (ref 0–5)
SODIUM SERPL-SCNC: 140 MMOL/L (ref 136–145)
SP GR UR REFRACTOMETRY: 1.02 (ref 1–1.03)
UROBILINOGEN UR QL STRIP.AUTO: 0.2 EU/DL (ref 0.2–1)
WBC # BLD AUTO: 19.9 K/UL (ref 4.6–13.2)
WBC URNS QL MICRO: ABNORMAL /HPF (ref 0–4)

## 2017-10-15 PROCEDURE — 85730 THROMBOPLASTIN TIME PARTIAL: CPT | Performed by: EMERGENCY MEDICINE

## 2017-10-15 PROCEDURE — 74011000250 HC RX REV CODE- 250: Performed by: EMERGENCY MEDICINE

## 2017-10-15 PROCEDURE — 83605 ASSAY OF LACTIC ACID: CPT

## 2017-10-15 PROCEDURE — 81001 URINALYSIS AUTO W/SCOPE: CPT

## 2017-10-15 PROCEDURE — 74011250636 HC RX REV CODE- 250/636: Performed by: PHYSICIAN ASSISTANT

## 2017-10-15 PROCEDURE — 85025 COMPLETE CBC W/AUTO DIFF WBC: CPT | Performed by: EMERGENCY MEDICINE

## 2017-10-15 PROCEDURE — 96365 THER/PROPH/DIAG IV INF INIT: CPT

## 2017-10-15 PROCEDURE — 74011250637 HC RX REV CODE- 250/637: Performed by: EMERGENCY MEDICINE

## 2017-10-15 PROCEDURE — 85610 PROTHROMBIN TIME: CPT | Performed by: EMERGENCY MEDICINE

## 2017-10-15 PROCEDURE — 74011000258 HC RX REV CODE- 258: Performed by: PHYSICIAN ASSISTANT

## 2017-10-15 PROCEDURE — 87077 CULTURE AEROBIC IDENTIFY: CPT | Performed by: EMERGENCY MEDICINE

## 2017-10-15 PROCEDURE — 96375 TX/PRO/DX INJ NEW DRUG ADDON: CPT

## 2017-10-15 PROCEDURE — 71010 XR CHEST PORT: CPT

## 2017-10-15 PROCEDURE — 99285 EMERGENCY DEPT VISIT HI MDM: CPT

## 2017-10-15 PROCEDURE — 80048 BASIC METABOLIC PNL TOTAL CA: CPT | Performed by: EMERGENCY MEDICINE

## 2017-10-15 PROCEDURE — 87086 URINE CULTURE/COLONY COUNT: CPT | Performed by: EMERGENCY MEDICINE

## 2017-10-15 PROCEDURE — 94640 AIRWAY INHALATION TREATMENT: CPT

## 2017-10-15 PROCEDURE — 87186 SC STD MICRODIL/AGAR DIL: CPT | Performed by: EMERGENCY MEDICINE

## 2017-10-15 PROCEDURE — 74011250636 HC RX REV CODE- 250/636: Performed by: EMERGENCY MEDICINE

## 2017-10-15 RX ORDER — IPRATROPIUM BROMIDE AND ALBUTEROL SULFATE 2.5; .5 MG/3ML; MG/3ML
3 SOLUTION RESPIRATORY (INHALATION) ONCE
Status: COMPLETED | OUTPATIENT
Start: 2017-10-15 | End: 2017-10-15

## 2017-10-15 RX ORDER — GUAIFENESIN/DEXTROMETHORPHAN 100-10MG/5
10 SYRUP ORAL
Status: COMPLETED | OUTPATIENT
Start: 2017-10-15 | End: 2017-10-15

## 2017-10-15 RX ORDER — ALBUTEROL SULFATE 0.83 MG/ML
5 SOLUTION RESPIRATORY (INHALATION)
Status: COMPLETED | OUTPATIENT
Start: 2017-10-15 | End: 2017-10-15

## 2017-10-15 RX ORDER — CEPHALEXIN 500 MG/1
1000 CAPSULE ORAL 2 TIMES DAILY
Qty: 28 CAP | Refills: 0 | Status: SHIPPED | OUTPATIENT
Start: 2017-10-15 | End: 2017-10-22

## 2017-10-15 RX ADMIN — METHYLPREDNISOLONE SODIUM SUCCINATE 125 MG: 125 INJECTION, POWDER, FOR SOLUTION INTRAMUSCULAR; INTRAVENOUS at 20:24

## 2017-10-15 RX ADMIN — IPRATROPIUM BROMIDE AND ALBUTEROL SULFATE 3 ML: .5; 3 SOLUTION RESPIRATORY (INHALATION) at 20:24

## 2017-10-15 RX ADMIN — ALBUTEROL SULFATE 5 MG: 2.5 SOLUTION RESPIRATORY (INHALATION) at 20:24

## 2017-10-15 RX ADMIN — SODIUM CHLORIDE 250 ML: 900 INJECTION, SOLUTION INTRAVENOUS at 20:40

## 2017-10-15 RX ADMIN — GUAIFENESIN AND DEXTROMETHORPHAN 10 ML: 100; 10 SYRUP ORAL at 20:24

## 2017-10-15 RX ADMIN — CEFTRIAXONE 1 G: 1 INJECTION, POWDER, FOR SOLUTION INTRAMUSCULAR; INTRAVENOUS at 20:29

## 2017-10-15 NOTE — ED NOTES
6: 15 PM  Consulted with BEBA Sal  concerning patient Cheryl Arroyo, standard discussion of reason for visit, HPI, ROS, PE, and current results available. Report was given at this time and pt was turned over to me, I will assume care of pt at this time and disposition. BEBA Cordova     CONSTITUTIONAL: Alert, in no apparent distress; well-developed; well-nourished. HEAD:  Normocephalic, atraumatic. EYES: PERRL; EOM's intact. ENTM: Nose: No rhinorrhea; Throat: mucous membranes moist. Posterior pharynx-normal.  Neck:  No JVD, supple without lymphadenopathy. RESP: Chest mild wheezing,  Decreased breath sounds. CV: S1 and S2 WNL; No murmurs, gallops or rubs. GI: Abdomen soft and non-tender. No masses or organomegaly. UPPER EXT:  Normal inspection. LOWER EXT: Normal inspection. NEURO: strength 5/5 and sym, sensation intact. SKIN: No rashes; Normal for age and stage. PSYCH:  Alert and oriented, normal affect.     Pt has an elevated WBC, will obtain POC Lactic, Urine

## 2017-10-15 NOTE — ED TRIAGE NOTES
Patient's family states he has blood in his Depends, patient denies pain, incontinent of urine, no flank pain

## 2017-10-15 NOTE — ED NOTES
79 yo M with hematuria starting this afternoon. On plavix. Denies abdominal pain. I performed a brief evaluation, including history and physical, of the patient here in triage and I have determined that pt will need further treatment and evaluation from the main side ER physician. I have placed initial orders to help in expediting patients care.      October 15, 2017 at 5:34 PM - Jaquan Thornton MD        Visit Vitals    /78 (BP 1 Location: Right arm, BP Patient Position: At rest;Sitting)    Pulse 93    Temp 97.4 °F (36.3 °C)    Resp 18    Ht 5' 8\" (1.727 m)    Wt 74.4 kg (164 lb)    SpO2 91%    BMI 24.94 kg/m2

## 2017-10-15 NOTE — ED PROVIDER NOTES
Patient is a 80 y.o. male presenting with hematuria. The history is provided by the patient and a relative. Blood in Urine    Associated symptoms include frequency and hematuria. Pertinent negatives include no nausea, no vomiting, no flank pain and no abdominal pain. pt presents with painless hematuria x 1hr. Bright red blood in pull-up per family. Daughter says family called her in a panic as this hasn't happened previously. Is on anticoagulation and has a urologist.  Pt deneis rectal pain, abdominopelvic pain, dysuria. Has had increased urinary frequency. Denies fever, flank pain. No meds taken pta for relief. On 5L NC for his COPD. Denies ETOH, tobacco, illicits. Past Medical History:   Diagnosis Date    BPH (benign prostatic hyperplasia)     COPD (chronic obstructive pulmonary disease) (HCC)     History of blood clots     Hypercholesterolemia     Hypertension     Hypertensive cardiovascular disease     Hypothyroidism     Infection and inflammatory reaction due to indwelling urinary catheter, subsequent encounter     Peripheral vascular disease ([facility] [address]     Pneumonia     Urinary retention     UTI (urinary tract infection)        Past Surgical History:   Procedure Laterality Date    HX ORTHOPAEDIC           Family History:   Problem Relation Age of Onset    Family history unknown: Yes       Social History     Social History    Marital status:      Spouse name: N/A    Number of children: N/A    Years of education: N/A     Occupational History    Not on file. Social History Main Topics    Smoking status: Former Smoker    Smokeless tobacco: Never Used    Alcohol use No    Drug use: No    Sexual activity: Not on file     Other Topics Concern    Not on file     Social History Narrative         ALLERGIES: Review of patient's allergies indicates no known allergies. Review of Systems   Constitutional: Negative. HENT: Negative. Eyes: Negative.     Respiratory: Negative. Cardiovascular: Negative. Gastrointestinal: Negative for abdominal pain, blood in stool, nausea and vomiting. Endocrine: Negative. Genitourinary: Positive for frequency and hematuria. Negative for difficulty urinating, dysuria, flank pain and genital sores. Musculoskeletal: Negative. Skin: Negative. Negative for rash and wound. Allergic/Immunologic: Negative. Neurological: Negative. Hematological: Negative. Psychiatric/Behavioral: Negative. All other systems reviewed and are negative. Vitals:    10/15/17 1733   BP: 153/78   Pulse: 93   Resp: 18   Temp: 97.4 °F (36.3 °C)   SpO2: 91%   Weight: 74.4 kg (164 lb)   Height: 5' 8\" (1.727 m)            Physical Exam   Constitutional: Vital signs are normal. He appears well-developed and well-nourished. He is active. Non-toxic appearance. He does not appear ill. No distress. HENT:   Head: Normocephalic and atraumatic. Neck: Normal range of motion. Neck supple. Carotid bruit is not present. No tracheal deviation present. No thyromegaly present. Cardiovascular: Normal rate, regular rhythm and normal heart sounds. Exam reveals no gallop and no friction rub. No murmur heard. Pulmonary/Chest: Effort normal. No stridor. No respiratory distress. He has wheezes. He has no rales. He exhibits no tenderness. Very coarse; inspiratory and expiratory wheezing throughout. Productive yellow sputum. Abdominal: Soft. He exhibits no distension and no mass. There is no tenderness. There is no rebound, no guarding and no CVA tenderness. Genitourinary: Prostate normal.   Genitourinary Comments: Bright red blood w/o clots in pull-up. Musculoskeletal: Normal range of motion. Neurological: He is alert. Skin: Skin is warm, dry and intact. He is not diaphoretic. No pallor. Psychiatric: He has a normal mood and affect.  His speech is normal and behavior is normal. Judgment and thought content normal.   Nursing note and vitals reviewed. MDM  Number of Diagnoses or Management Options  Cough:   Hematuria, unspecified type:   Diagnosis management comments: Differential: malignancy; UTI; hematuria; obstruction; excessive anticoagulation; trombocytopenia; anemia; PNA; COPD exacerbation    Prostate non-tender. Check labs. Discussed w/ED attending. If no definite etiology for today's symptoms, needs urology f/up. Heavy coughing in exam room. Will order CXR and give nebs. IMPRESSION AND MEDICAL DECISION MAKING:  Based upon the patient's presentation with noted HPI and PE, along with the work up done in the emergency department, I believe that the patient has a UTI as well as gross hematuria. Will treat and have him follow up with Urology and his PCP. The patient will be discharged home. Warning signs of worsening condition were discussed and understood by the patient. Based on patient's age, coexisting illness, exam, and the results of this ED evaluation, the decision to treat as an outpatient was made. Based on the information available at time of discharge, acute pathology requiring immediate intervention was deemed relative unlikely. While it is impossible to completely exclude the possibility of underlying serious disease or worsening of condition, I feel the relative likelihood is extremely low. I discussed this uncertainty with the patient, who understood ED evaluation and treatment and felt comfortable with the outpatient treatment plan. All questions regarding care, test results, and follow up were answered. The patient is stable and appropriate to discharge. They understand that they should return to the emergency department for any new or worsening symptoms. I stressed the importance of follow up for repeat assessment and possibly further evaluation/treatment.              Amount and/or Complexity of Data Reviewed  Clinical lab tests: ordered  Tests in the radiology section of CPT®: ordered      ED Course Procedures      6:12 PM  Diagnosis:   1. Hematuria, unspecified type    2. Cough          Disposition: tbd; care turned over to oncoming provider at end of shift. Follow-up Information     None          Patient's Medications   Start Taking    No medications on file   Continue Taking    ALBUTEROL (PROVENTIL HFA, VENTOLIN HFA, PROAIR HFA) 90 MCG/ACTUATION INHALER    Take 1 Puff by inhalation every four (4) hours as needed for Wheezing or Shortness of Breath. ALBUTEROL-IPRATROPIUM (DUO-NEB) 2.5 MG-0.5 MG/3 ML NEBULIZER SOLUTION    3 mL by Nebulization route every six (6) hours. ASPIRIN 81 MG CHEWABLE TABLET    Take 1 Tab by mouth daily. CLOPIDOGREL (PLAVIX) 75 MG TABLET    Take 75 mg by mouth daily. Indications: lot big toe    DILTIAZEM CD (CARDIZEM CD) 180 MG ER CAPSULE    Take 1 Cap by mouth daily. FINASTERIDE (PROSCAR) 5 MG TABLET    Take 1 Tab by mouth daily. FLUTICASONE-SALMETEROL (ADVAIR DISKUS) 500-50 MCG/DOSE DISKUS INHALER    Take 1 Puff by inhalation every twelve (12) hours. FUROSEMIDE (LASIX) 40 MG TABLET    1 BID    LACTULOSE (CHRONULAC) 10 GRAM/15 ML SOLUTION    Take 30 mL by mouth two (2) times a day. LEVOTHYROXINE (SYNTHROID) 50 MCG TABLET    Take 50 mcg by mouth Daily (before breakfast). Indications: HYPOTHYROIDISM    MIRTAZAPINE (REMERON) 15 MG TABLET    Take 1 Tab by mouth nightly. OXYGEN-AIR DELIVERY SYSTEMS    4 L by Nasal route continuous. PREDNISONE (DELTASONE) 2.5 MG TABLET    Take 3 Tabs by mouth daily (with breakfast). QUETIAPINE (SEROQUEL) 25 MG TABLET    Take 1 Tab by mouth nightly. RANITIDINE (ZANTAC) 150 MG TABLET    Take 150 mg by mouth two (2) times a day. TAMSULOSIN (FLOMAX) 0.4 MG CAPSULE    Take 1 Cap by mouth daily (after dinner). TIOTROPIUM (SPIRIVA) 18 MCG INHALATION CAPSULE    Take 1 Cap by inhalation daily.    These Medications have changed    No medications on file   Stop Taking    No medications on file

## 2017-10-15 NOTE — ED NOTES
Inadvertently charted patient IV removed and discharged. Patient not discharged, IV not removed. I did not see or give care to this patient.

## 2017-10-16 NOTE — ED NOTES
I have reviewed discharge instructions with the patient. The patient verbalized understanding. Patient armband removed and shredded. Patient discharged ambulatory to [facility].

## 2017-10-16 NOTE — PROGRESS NOTES
Voicemail left for patient regarding CXR read and when he is able to see PCP. Patient was placed on cephalexin yesterday, could consider switching abx to Levaquin given CXR reading and productive cough.

## 2017-10-16 NOTE — DISCHARGE INSTRUCTIONS

## 2017-10-18 LAB
BACTERIA SPEC CULT: ABNORMAL
SERVICE CMNT-IMP: ABNORMAL

## 2018-01-29 ENCOUNTER — HOSPITAL ENCOUNTER (EMERGENCY)
Age: 83
Discharge: HOME | End: 2018-01-29
Attending: EMERGENCY MEDICINE
Payer: MEDICARE

## 2018-01-29 VITALS
WEIGHT: 165 LBS | HEIGHT: 68 IN | HEART RATE: 83 BPM | BODY MASS INDEX: 25.01 KG/M2 | DIASTOLIC BLOOD PRESSURE: 80 MMHG | OXYGEN SATURATION: 95 % | SYSTOLIC BLOOD PRESSURE: 146 MMHG | RESPIRATION RATE: 23 BRPM

## 2018-01-29 DIAGNOSIS — Z51.81 SUBTHERAPEUTIC ANTICOAGULATION: ICD-10-CM

## 2018-01-29 DIAGNOSIS — R04.0 EPISTAXIS: Primary | ICD-10-CM

## 2018-01-29 DIAGNOSIS — Z79.01 SUBTHERAPEUTIC ANTICOAGULATION: ICD-10-CM

## 2018-01-29 LAB
ANION GAP SERPL CALC-SCNC: 6 MMOL/L (ref 3–18)
BASOPHILS # BLD: 0 K/UL (ref 0–0.06)
BASOPHILS NFR BLD: 0 % (ref 0–2)
BUN SERPL-MCNC: 39 MG/DL (ref 7–18)
BUN/CREAT SERPL: 19 (ref 12–20)
CALCIUM SERPL-MCNC: 8.9 MG/DL (ref 8.5–10.1)
CHLORIDE SERPL-SCNC: 99 MMOL/L (ref 100–108)
CO2 SERPL-SCNC: 36 MMOL/L (ref 21–32)
CREAT SERPL-MCNC: 2.03 MG/DL (ref 0.6–1.3)
DIFFERENTIAL METHOD BLD: ABNORMAL
EOSINOPHIL # BLD: 0 K/UL (ref 0–0.4)
EOSINOPHIL NFR BLD: 0 % (ref 0–5)
ERYTHROCYTE [DISTWIDTH] IN BLOOD BY AUTOMATED COUNT: 15.7 % (ref 11.6–14.5)
GLUCOSE SERPL-MCNC: 141 MG/DL (ref 74–99)
HCT VFR BLD AUTO: 44 % (ref 36–48)
HGB BLD-MCNC: 14 G/DL (ref 13–16)
INR PPP: 0.9 (ref 0.8–1.2)
LYMPHOCYTES # BLD: 3.1 K/UL (ref 0.9–3.6)
LYMPHOCYTES NFR BLD: 19 % (ref 21–52)
MCH RBC QN AUTO: 29 PG (ref 24–34)
MCHC RBC AUTO-ENTMCNC: 31.8 G/DL (ref 31–37)
MCV RBC AUTO: 91.1 FL (ref 74–97)
MONOCYTES # BLD: 0.9 K/UL (ref 0.05–1.2)
MONOCYTES NFR BLD: 6 % (ref 3–10)
NEUTS SEG # BLD: 11.8 K/UL (ref 1.8–8)
NEUTS SEG NFR BLD: 75 % (ref 40–73)
PLATELET # BLD AUTO: 273 K/UL (ref 135–420)
PMV BLD AUTO: 10.6 FL (ref 9.2–11.8)
POTASSIUM SERPL-SCNC: 3.8 MMOL/L (ref 3.5–5.5)
PROTHROMBIN TIME: 11.9 SEC (ref 11.5–15.2)
RBC # BLD AUTO: 4.83 M/UL (ref 4.7–5.5)
SODIUM SERPL-SCNC: 141 MMOL/L (ref 136–145)
WBC # BLD AUTO: 15.8 K/UL (ref 4.6–13.2)

## 2018-01-29 PROCEDURE — 85610 PROTHROMBIN TIME: CPT | Performed by: EMERGENCY MEDICINE

## 2018-01-29 PROCEDURE — 85025 COMPLETE CBC W/AUTO DIFF WBC: CPT | Performed by: EMERGENCY MEDICINE

## 2018-01-29 PROCEDURE — 80048 BASIC METABOLIC PNL TOTAL CA: CPT | Performed by: EMERGENCY MEDICINE

## 2018-01-29 PROCEDURE — 99284 EMERGENCY DEPT VISIT MOD MDM: CPT

## 2018-01-29 PROCEDURE — 74011000250 HC RX REV CODE- 250: Performed by: EMERGENCY MEDICINE

## 2018-01-29 PROCEDURE — 74011250637 HC RX REV CODE- 250/637: Performed by: EMERGENCY MEDICINE

## 2018-01-29 RX ORDER — OXYMETAZOLINE HCL 0.05 %
3 SPRAY, NON-AEROSOL (ML) NASAL
Status: COMPLETED | OUTPATIENT
Start: 2018-01-29 | End: 2018-01-29

## 2018-01-29 RX ORDER — BACITRACIN 500 [USP'U]/G
OINTMENT TOPICAL
Qty: 28 G | Refills: 1 | Status: SHIPPED | OUTPATIENT
Start: 2018-01-29

## 2018-01-29 RX ORDER — OXYMETAZOLINE HCL 0.05 %
2 SPRAY, NON-AEROSOL (ML) NASAL 2 TIMES DAILY
Qty: 1 EACH | Refills: 0 | Status: SHIPPED | OUTPATIENT
Start: 2018-01-29 | End: 2018-02-01

## 2018-01-29 RX ORDER — BACITRACIN 500 UNIT/G
3 PACKET (EA) TOPICAL
Status: COMPLETED | OUTPATIENT
Start: 2018-01-29 | End: 2018-01-29

## 2018-01-29 RX ADMIN — BACITRACIN 3 PACKET: 500 OINTMENT TOPICAL at 19:32

## 2018-01-29 RX ADMIN — OXYMETAZOLINE HYDROCHLORIDE 3 SPRAY: 5 SPRAY NASAL at 19:32

## 2018-01-29 NOTE — ED TRIAGE NOTES
Nose bleed that started around 3:30pm. Reports have tried \"everything\" at home. Actively bleeding in triage.

## 2018-01-30 NOTE — ED NOTES
Nurse at the bedside, caregiver verbalizes the patient sats between 88 to 92 at baseline while home on Oxygen at 4 L/NC

## 2018-01-30 NOTE — ED PROVIDER NOTES
HPI Comments: Wali Holley is a 80 y.o. Male with h/o chronic cardiac, pulm disease, oxygen dependence with c/o right sided nose bleed intermittent since this earlier this afternoon. Has tried bacitracin without relief. No known trigger or alleviating factors. No fever, new cough. Is on coumadin. Due for recheck with pcp tomorrow. No h/o freq epistaxis in the past    The history is provided by the patient and a relative. Past Medical History:   Diagnosis Date    Benign prostatic hyperplasia with urinary retention     BPH (benign prostatic hyperplasia)     COPD (chronic obstructive pulmonary disease) (HCC)     Enlarged prostate     Gross hematuria     History of blood clots     Hypercholesterolemia     Hypertension     Hypertensive cardiovascular disease     Hypothyroidism     Infection and inflammatory reaction due to indwelling urinary catheter, subsequent encounter     Peripheral vascular disease ([facility] [address]     Pneumonia     Thrombosis     Urinary retention     Urine leukocytes     UTI (urinary tract infection)        Past Surgical History:   Procedure Laterality Date    HX BACK SURGERY  1973    HX ORTHOPAEDIC           Family History:   Problem Relation Age of Onset    Family history unknown: Yes       Social History     Social History    Marital status:      Spouse name: N/A    Number of children: N/A    Years of education: N/A     Occupational History    Not on file. Social History Main Topics    Smoking status: Former Smoker    Smokeless tobacco: Never Used    Alcohol use No    Drug use: No    Sexual activity: Not on file     Other Topics Concern    Not on file     Social History Narrative         ALLERGIES: Review of patient's allergies indicates no known allergies. Review of Systems   Constitutional: Negative for fever. HENT: Positive for nosebleeds. Negative for sore throat and trouble swallowing. Eyes: Negative for visual disturbance.    Respiratory: Positive for cough (chronic) and shortness of breath (chronic). Cardiovascular: Positive for leg swelling (chronic). Gastrointestinal: Negative for abdominal pain. Endocrine: Negative for polyuria. Genitourinary: Negative for difficulty urinating. Musculoskeletal: Negative for joint swelling. Skin: Negative for rash. Allergic/Immunologic: Negative for immunocompromised state. Neurological: Negative for syncope. Hematological: Bruises/bleeds easily. Psychiatric/Behavioral: Positive for sleep disturbance. Vitals:    01/29/18 1838 01/29/18 1900 01/29/18 1915 01/29/18 1930   BP:  136/68 155/84 (!) 137/91   Pulse: 89 85 93 87   Resp:  26 21 19   SpO2:  97% 96% 91%   Weight: 74.8 kg (165 lb)      Height: 5' 8\" (1.727 m)               Physical Exam   Constitutional: He is oriented to person, place, and time. Non-toxic appearance. He does not appear ill. No distress. Elderly appearing     HENT:   Head: Normocephalic and atraumatic. Right Ear: External ear normal.   Left Ear: External ear normal.   Nose: Mucosal edema present. Epistaxis (appears to be ant source. min bleeding now) is observed. Mouth/Throat: Oropharynx is clear and moist. No oropharyngeal exudate. No blood dripping in post pharynx     Eyes: Conjunctivae are normal.   Neck: Normal range of motion. Cardiovascular: Normal rate, regular rhythm, normal heart sounds and intact distal pulses. Pulmonary/Chest: Effort normal. No respiratory distress. He has no decreased breath sounds. He has rhonchi. Abdominal: Soft. There is no tenderness. Musculoskeletal: Normal range of motion. He exhibits no edema. Neurological: He is alert and oriented to person, place, and time. Skin: Skin is warm and dry. He is not diaphoretic. Psychiatric: His behavior is normal.   Nursing note and vitals reviewed.        [facility]  ED Course       Procedures  Vitals:  Patient Vitals for the past 12 hrs:   Pulse Resp BP SpO2   01/29/18 1930 87 19 (!) 137/91 91 %   01/29/18 1915 93 21 155/84 96 %   01/29/18 1900 85 26 136/68 97 %   01/29/18 1838 89 - - -         Medications ordered:   Medications   bacitracin 500 unit/gram packet 3 Packet (3 Packets Topical Given 1/29/18 1932)   oxymetazoline (AFRIN) 0.05 % nasal spray 3 Spray (3 Sprays Both Nostrils Given 1/29/18 1932)         Lab findings:  Recent Results (from the past 12 hour(s))   CBC WITH AUTOMATED DIFF    Collection Time: 01/29/18  7:27 PM   Result Value Ref Range    WBC 15.8 (H) 4.6 - 13.2 K/uL    RBC 4.83 4.70 - 5.50 M/uL    HGB 14.0 13.0 - 16.0 g/dL    HCT 44.0 36.0 - 48.0 %    MCV 91.1 74.0 - 97.0 FL    MCH 29.0 24.0 - 34.0 PG    MCHC 31.8 31.0 - 37.0 g/dL    RDW 15.7 (H) 11.6 - 14.5 %    PLATELET 845 731 - 729 K/uL    MPV 10.6 9.2 - 11.8 FL    NEUTROPHILS 75 (H) 40 - 73 %    LYMPHOCYTES 19 (L) 21 - 52 %    MONOCYTES 6 3 - 10 %    EOSINOPHILS 0 0 - 5 %    BASOPHILS 0 0 - 2 %    ABS. NEUTROPHILS 11.8 (H) 1.8 - 8.0 K/UL    ABS. LYMPHOCYTES 3.1 0.9 - 3.6 K/UL    ABS. MONOCYTES 0.9 0.05 - 1.2 K/UL    ABS. EOSINOPHILS 0.0 0.0 - 0.4 K/UL    ABS.  BASOPHILS 0.0 0.0 - 0.06 K/UL    DF AUTOMATED     PROTHROMBIN TIME + INR    Collection Time: 01/29/18  7:27 PM   Result Value Ref Range    Prothrombin time 11.9 11.5 - 15.2 sec    INR 0.9 0.8 - 1.2     METABOLIC PANEL, BASIC    Collection Time: 01/29/18  7:27 PM   Result Value Ref Range    Sodium 141 136 - 145 mmol/L    Potassium 3.8 3.5 - 5.5 mmol/L    Chloride 99 (L) 100 - 108 mmol/L    CO2 36 (H) 21 - 32 mmol/L    Anion gap 6 3.0 - 18 mmol/L    Glucose 141 (H) 74 - 99 mg/dL    BUN 39 (H) 7.0 - 18 MG/DL    Creatinine 2.03 (H) 0.6 - 1.3 MG/DL    BUN/Creatinine ratio 19 12 - 20      GFR est AA 38 (L) >60 ml/min/1.73m2    GFR est non-AA 31 (L) >60 ml/min/1.73m2    Calcium 8.9 8.5 - 10.1 MG/DL       EKG interpretation by ED Physician:      X-Ray, CT or other radiology findings or impressions:  No orders to display       Progress notes, Consult notes or additional Procedure notes:   Bleeding controlled here. No bleeding posterior on recheck  D/w pt daughter to take an extra dose of coumadin tonight. They have f/u tomorrow morning with pcp  I have discussed with patient and/or family/sig other the results, interpretation of any imaging if performed, suspected diagnosis and treatment plan to include instructions regarding the diagnoses listed to which understanding was expressed with all questions answered      Reevaluation of patient:   Stable for dc    Disposition:  Diagnosis:   1. Epistaxis    2. Subtherapeutic anticoagulation        Disposition: home      Follow-up Information     Follow up With Details Comments 2017 Nia Pisano MD Schedule an appointment as soon as possible for a visit tomorrow for recheck in office [address]   [address] [facility] [address]  [phone] [id number]  [phone]      [facility] EMERGENCY DEPT  If symptoms worsen [address]  [phone]            Patient's Medications   Start Taking    BACITRACIN (BACITRACIN) 500 UNIT/GRAM OINT    Apply  to affected area nightly. Apply to affected area    OXYMETAZOLINE (AFRIN, OXYMETAZOLINE,) 0.05 % NASAL SPRAY    2 Sprays by Both Nostrils route two (2) times a day for 3 days. SODIUM CHLORIDE (OCEAN NASAL) 0.65 % NASAL SPRAY    1 [place] by Both Nostrils route three (3) times daily. Continue Taking    ALBUTEROL-IPRATROPIUM (DUO-NEB) 2.5 MG-0.5 MG/3 ML NEBULIZER SOLUTION    3 mL by Nebulization route every six (6) hours. ASPIRIN 81 MG CHEWABLE TABLET    Take 1 Tab by mouth daily. CLOPIDOGREL (PLAVIX) 75 MG TABLET    Take 75 mg by mouth daily. Indications: lot big toe    DILTIAZEM CD (CARDIZEM CD) 180 MG ER CAPSULE    Take 1 Cap by mouth daily. FINASTERIDE (PROSCAR) 5 MG TABLET    Take 1 Tab by mouth daily. FLUTICASONE-SALMETEROL (ADVAIR DISKUS) 500-50 MCG/DOSE DISKUS INHALER    Take 1 Puff by inhalation every twelve (12) hours.     FUROSEMIDE (LASIX) 40 MG TABLET    1 BID    LEVOTHYROXINE (SYNTHROID) 50 MCG TABLET    Take 50 mcg by mouth Daily (before breakfast). Indications: HYPOTHYROIDISM    MIRTAZAPINE (REMERON) 15 MG TABLET    Take 1 Tab by mouth nightly. OXYGEN-AIR DELIVERY SYSTEMS    4 L by Nasal route continuous. PREDNISONE (DELTASONE) 2.5 MG TABLET    Take 3 Tabs by mouth daily (with breakfast). RANITIDINE (ZANTAC) 150 MG TABLET    Take 150 mg by mouth two (2) times a day. SILODOSIN (RAPAFLO) 8 MG CAPSULE    Take 1 Cap by mouth daily (with dinner). TIOTROPIUM (SPIRIVA) 18 MCG INHALATION CAPSULE    Take 1 Cap by inhalation daily.    These Medications have changed    No medications on file   Stop Taking    No medications on file

## 2020-02-10 NOTE — PROGRESS NOTES
Problem: Self Care Deficits Care Plan (Adult)  Goal: *Acute Goals and Plan of Care (Insert Text)  OCCUPATIONAL THERAPY SHORT TERM GOALS   Initiated 7/1/2017 and to be accomplished within 2 Week(s)    1. Patient will perform Upper body ADLs with/without adaptive equipment with minimal assistance/contact guard assist.  2. Patient will perform Lower body ADLs with/without adaptive equipment with moderate assistance. 3. Patient will perform toileting task with moderate assistance with Fair safety to reduce falls risk. 4. Patient will perform functional transfers with Mac Amour and maximal assistance. 5. Patient will perform standing static/dynamic balance activities for improved ADL/IADL function with moderate assistance and Fair balance and safety awarenes. 6. Patient will improve Barthel index scores to atleast 45/100 to improve functional mobility. OCCUPATIONAL THERAPY LONG TERM GOALS   Initiated 7/1/2017 and to be accomplished within 4 Week(s)    1. Patient will perform Upper body ADLs with/without adaptive equipment with supervision/set-up. 2. Patient will perform Lower body ADLs with/without adaptive equipment with minimal assistance/contact guard assist.  3. Patient will perform toileting task with minimal assistance/contact guard assist with Good safety to reduce falls risk. 4. Patient will perform functional transfers with Rolling Walker and moderate assistance and Fair+ balance and safety awareness. 5. Patient will perform standing static/dynamic activity for improved ADL/IADL function with minimal assistance/contact guard assist an and Fair balance and safety awareness. 6. Patient will improve Barthel index score to 55/100 to improve independence with mobility.     Therapist: MS ADRIAN Rust/JAUN 7/1/2017   [facility]   OCCUPATIONAL THERAPY DAILY TREATMENT NOTE        Patient: Chiqui Wynn (19 y.o. male)                          Date: 7/4/2017  Attending Physician: Marylen Sees, MD  Primary Diagnosis: Pneumonia    Treatment Diagnosis  Treatment Diagnosis: muscle weakness  Treatment Diagnosis 2: difficulty in walking   Precautions : Precautions at Admission: Fall, Other (comment) (oxygen)  Vital Signs:        Cognitive Status:  Mental Status  Neurologic State: Alert; Appropriate for age  Orientation Level: Oriented to person  Cognition: Decreased attention/concentration  Pain:        Gross Assessment:     Coordination:     Bed Mobility:  Bed Mobility  Supine to Sit: Minimum assistance (elevated HOB)  Transfers:  Functional Transfers  Bed to Chair: Moderate assistance;Assist x2     Balance:  Balance  Sitting: With support  Sitting - Static: Fair (occasional)  Sitting - Dynamic: Fair (occasional)  Standing: With support  Standing - Static: Poor        Therapeutic Activities:  Assisted patient with bed mobility in order to assess safety and independence during task. See above for levels of A needed. Functional reach activity, seated EOB, in order to challenge balance and stability needed for ADL tasks. Partially co-treated with PT for optimal functional gains with functional transfer needed for toileting transfers. Practiced stand pivot transfers in order to assess safety and independence during task. See above for levels of A needed. Cueing needed for patient to increase participation with transfers for optimal strengthening and independence. Patient/Caregiver Education:    Pt. Carlee Saha Education on see above.         ASSESSMENT:  Patient continues to demonstrate the need for skilled Occupational Therapy services to improve functional transfers needed for toilet transfer  Progression toward goals:  [ ]      Improving appropriately and progressing toward goals  [X]      Improving slowly and progressing toward goals  [ ]      Not making progress toward goals and plan of care will be adjusted      Treatment session:   15 minutes     Therapist:    Deny Kelly 7/4/2017 Discharge planning issues DVT prophylaxis

## 2023-03-02 NOTE — PROGRESS NOTES
NUTRITION follow-up/Plan of care    RECOMMENDATIONS:     1. Cardiac, Mechanical Soft  2. Ensure Enlive daily  2. Monitor weight and PO intake  3. RD to follow  GOALS:   1. Ongoing: PO intake meets >75% of protein/calorie needs by  7/19  2. Met/Ongoing: Maintain weight (+/- 1-2 lb) by 7/19    ASSESSMENT:   Weight status is classified as normal per BMI of 25.4. PO intake is good. Recives Ensure [place] daily for additional calories/protein. Labs reviewed. . Nutrition recommendations listed. RD to follow. Nutrition Risk:  []   High []  Moderate [x] Low    SUBJECTIVE/OBJECTIVE:     Visited with pt during lunch yesterday, daughters were with pt. He ate ~50% of lunch, per daughters that is good for this pt. 1# wt increase this week. Information Obtained From:   [x] Chart Review  [x] Patient  [x] Family/Caregiver  [] Nurse/Physician   [x] Patient Rounds/Interdisciplinary Meeting    Diet: Cardiac, Mechanical Soft  No data found. Medications: [x] Reviewed   No diagnosis found.   Past Medical History:   Diagnosis Date    BPH (benign prostatic hyperplasia)     COPD [facility] obstructive [facility] disease) ([facility] [address]     History of blood clots     Hypercholesterolemia     Hypertension     Hypertensive cardiovascular disease     Hypothyroidism     Infection and inflammatory reaction due to indwelling urinary catheter, subsequent encounter     [facility]     Pneumonia     Urinary retention     UTI (urinary tract infection)      Labs:    Lab Results   Component Value Date/Time    Sodium 142 06/21/2017 06:34 AM    Potassium 3.7 06/21/2017 06:34 AM    Chloride 94 06/21/2017 06:34 AM    CO2 42 06/21/2017 06:34 AM    Anion gap 6 06/21/2017 06:34 AM    Glucose 161 06/21/2017 06:34 AM    BUN 69 06/21/2017 06:34 AM    Creatinine 1.83 06/21/2017 06:34 AM    Calcium 8.2 06/21/2017 06:34 AM    Magnesium 2.6 02/01/2016 02:10 PM    Phosphorus 2.5 02/22/2011 03:40 AM    Albumin 2.8 06/13/2017 12:20 PM Unable to enter orders as apt is scheduled for more than 2 weeks out. Will place orders in chart next week.    Anthropometrics: BMI (calculated): 25.5   Last 3 Recorded Weights in this Encounter    06/27/17 2211 07/12/17 1044   Weight: 73.5 kg (162 lb) 73.9 kg (163 lb)      Ht Readings from Last 1 Encounters:   06/27/17 5' 7\" (1.702 m)     []  Weight Loss  [x]  Weight Gain  []  Weight Stable   []  New wt n/a on record     Estimated Nutrition Needs:      Calories: 1700  kcal Based on:   [x] Actual BW    Protein:   73 B Based on:   [x] Actual BW    Fluid:       3354 RK Based on:   [x] Actual BW        Nutrition Problems Identified:   [] Suboptimal PO intake   [] Food Allergies  [x] Difficulty chewing/swallowing/poor dentition  [] Constipation/Diarrhea   [] Nausea/Vomiting   [] None  [] Other:     Plan:   [x] Therapeutic Diet  []  Obtained/adjusted food preferences/tolerances and/or snacks options   [x]  Supplements continues   [] Occupational therapy following for feeding techniques  []  HS snack added   []  Modify diet texture   []  Modify diet for food allergies   []  Educate patient   []  Assist with menu selection   [x]  Monitor PO intake on meal rounds   [x]  Continue inpatient monitoring and intervention   [x]  Participated in discharge planning/Interdisciplinary rounds/Team meetings   []  Other:     Education Needs:   [] Not appropriate for teaching at this time due to:   [x] Identified and addressed    Nutrition Monitoring and Evaluation:   [x] Continue inpatient monitoring and interventions    [] Other:     Holly Pleitez  Pager: [phone]

## 2024-01-29 NOTE — ROUTINE PROCESS
Bedside and Verbal shift change report given to pablo tuttle lpn (oncoming nurse) by jacques Moffett lpn (offgoing nurse). Report included the following information SBAR, Kardex and MAR.  Hourly rounds Is This A New Presentation, Or A Follow-Up?: Nail Dystrophy